# Patient Record
Sex: FEMALE | Race: WHITE
[De-identification: names, ages, dates, MRNs, and addresses within clinical notes are randomized per-mention and may not be internally consistent; named-entity substitution may affect disease eponyms.]

---

## 2018-08-23 ENCOUNTER — HOSPITAL ENCOUNTER (EMERGENCY)
Dept: HOSPITAL 61 - ER | Age: 83
Discharge: HOME | End: 2018-08-23
Payer: MEDICARE

## 2018-08-23 VITALS — WEIGHT: 176 LBS | HEIGHT: 64 IN | BODY MASS INDEX: 30.05 KG/M2

## 2018-08-23 VITALS — SYSTOLIC BLOOD PRESSURE: 164 MMHG | DIASTOLIC BLOOD PRESSURE: 89 MMHG

## 2018-08-23 DIAGNOSIS — Z88.8: ICD-10-CM

## 2018-08-23 DIAGNOSIS — Z90.710: ICD-10-CM

## 2018-08-23 DIAGNOSIS — E78.00: ICD-10-CM

## 2018-08-23 DIAGNOSIS — E03.9: ICD-10-CM

## 2018-08-23 DIAGNOSIS — Z88.5: ICD-10-CM

## 2018-08-23 DIAGNOSIS — Z95.0: ICD-10-CM

## 2018-08-23 DIAGNOSIS — Z90.89: ICD-10-CM

## 2018-08-23 DIAGNOSIS — R00.0: ICD-10-CM

## 2018-08-23 DIAGNOSIS — Z88.2: ICD-10-CM

## 2018-08-23 DIAGNOSIS — Z90.49: ICD-10-CM

## 2018-08-23 DIAGNOSIS — N30.10: Primary | ICD-10-CM

## 2018-08-23 DIAGNOSIS — Z91.041: ICD-10-CM

## 2018-08-23 DIAGNOSIS — I11.9: ICD-10-CM

## 2018-08-23 LAB
ALBUMIN SERPL-MCNC: 3.5 G/DL (ref 3.4–5)
ALBUMIN/GLOB SERPL: 1 {RATIO} (ref 1–1.7)
ALP SERPL-CCNC: 76 U/L (ref 46–116)
ALT SERPL-CCNC: 18 U/L (ref 14–59)
ANION GAP SERPL CALC-SCNC: 9 MMOL/L (ref 6–14)
APTT PPP: (no result) S
AST SERPL-CCNC: 15 U/L (ref 15–37)
BACTERIA #/AREA URNS HPF: 0 /HPF
BASOPHILS # BLD AUTO: 0.1 X10^3/UL (ref 0–0.2)
BASOPHILS NFR BLD: 1 % (ref 0–3)
BILIRUB SERPL-MCNC: 0.8 MG/DL (ref 0.2–1)
BILIRUB UR QL STRIP: (no result)
BUN SERPL-MCNC: 15 MG/DL (ref 7–20)
BUN/CREAT SERPL: 19 (ref 6–20)
CALCIUM SERPL-MCNC: 9 MG/DL (ref 8.5–10.1)
CHLORIDE SERPL-SCNC: 103 MMOL/L (ref 98–107)
CO2 SERPL-SCNC: 26 MMOL/L (ref 21–32)
CREAT SERPL-MCNC: 0.8 MG/DL (ref 0.6–1)
EOSINOPHIL NFR BLD: 0.2 X10^3/UL (ref 0–0.7)
EOSINOPHIL NFR BLD: 3 % (ref 0–3)
ERYTHROCYTE [DISTWIDTH] IN BLOOD BY AUTOMATED COUNT: 14.7 % (ref 11.5–14.5)
FIBRINOGEN PPP-MCNC: CLEAR MG/DL
GFR SERPLBLD BASED ON 1.73 SQ M-ARVRAT: 67.8 ML/MIN
GLOBULIN SER-MCNC: 3.5 G/DL (ref 2.2–3.8)
GLUCOSE SERPL-MCNC: 124 MG/DL (ref 70–99)
HCT VFR BLD CALC: 43.6 % (ref 36–47)
HGB BLD-MCNC: 14.7 G/DL (ref 12–15.5)
HYALINE CASTS #/AREA URNS LPF: (no result) /HPF
LIPASE: 297 U/L (ref 73–393)
LYMPHOCYTES # BLD: 1.9 X10^3/UL (ref 1–4.8)
LYMPHOCYTES NFR BLD AUTO: 23 % (ref 24–48)
MCH RBC QN AUTO: 30 PG (ref 25–35)
MCHC RBC AUTO-ENTMCNC: 34 G/DL (ref 31–37)
MCV RBC AUTO: 90 FL (ref 79–100)
MONO #: 0.9 X10^3/UL (ref 0–1.1)
MONOCYTES NFR BLD: 11 % (ref 0–9)
NEUT #: 5.1 X10^3UL (ref 1.8–7.7)
NEUTROPHILS NFR BLD AUTO: 62 % (ref 31–73)
NITRITE UR QL STRIP: (no result)
PH UR STRIP: (no result) [PH]
PLATELET # BLD AUTO: 328 X10^3/UL (ref 140–400)
POTASSIUM SERPL-SCNC: 3.7 MMOL/L (ref 3.5–5.1)
PROT SERPL-MCNC: 7 G/DL (ref 6.4–8.2)
PROT UR STRIP-MCNC: (no result) MG/DL
RBC # BLD AUTO: 4.86 X10^6/UL (ref 3.5–5.4)
RBC #/AREA URNS HPF: 0 /HPF (ref 0–2)
SODIUM SERPL-SCNC: 138 MMOL/L (ref 136–145)
SQUAMOUS #/AREA URNS LPF: (no result) /LPF
UROBILINOGEN UR-MCNC: (no result) MG/DL
WBC # BLD AUTO: 8.2 X10^3/UL (ref 4–11)
WBC #/AREA URNS HPF: 0 /HPF (ref 0–4)

## 2018-08-23 PROCEDURE — 83605 ASSAY OF LACTIC ACID: CPT

## 2018-08-23 PROCEDURE — 93005 ELECTROCARDIOGRAM TRACING: CPT

## 2018-08-23 PROCEDURE — 81001 URINALYSIS AUTO W/SCOPE: CPT

## 2018-08-23 PROCEDURE — 83690 ASSAY OF LIPASE: CPT

## 2018-08-23 PROCEDURE — 36415 COLL VENOUS BLD VENIPUNCTURE: CPT

## 2018-08-23 PROCEDURE — 99285 EMERGENCY DEPT VISIT HI MDM: CPT

## 2018-08-23 PROCEDURE — 85025 COMPLETE CBC W/AUTO DIFF WBC: CPT

## 2018-08-23 PROCEDURE — 80053 COMPREHEN METABOLIC PANEL: CPT

## 2018-08-23 RX ADMIN — BACITRACIN ONE MLS/HR: 5000 INJECTION, POWDER, FOR SOLUTION INTRAMUSCULAR at 09:32

## 2018-08-23 NOTE — EKG
[facility]

              [address] [place] [address]

Test Date:    2018               Test Time:    08:29:40

Pat Name:     KEARA FOX         Department:   

Patient ID:   [id number]           Room:          

Gender:       F                        Technician:   

:          [id number]               Requested By: ELIZABET TABARES

Order Number: [id number]           Reading MD:   Fernando Torres MD

                                 Measurements

Intervals                              Axis          

Rate:         105                      P:            26

VT:           160                      QRS:          15

QRSD:         166                      T:            -159

QT:           380                                    

QTc:          506                                    

                           Interpretive Statements

V-PACED RHYTHM

PROBABLE SINUS

Electronically Signed On 2018 14:54:34 CDT by Fernando Torres MD

## 2018-08-23 NOTE — PHYS DOC
Past Medical History


Past Medical History:  High Cholesterol, Heart Disease, Hypertension, 

Hypothyroid, Other


Additional Past Medical Histor:  Freq.UTIs


Past Surgical History:  Appendectomy, Cholecystectomy, Hysterectomy, Pacemaker, 

Other


Additional Past Surgical Histo:  Back surgery,breast reduction,dental implants.


Alcohol Use:  None


Drug Use:  None





Adult General


Chief Complaint


Chief Complaint:  PAIN ON URINATION





[facility]


HPI





Patient is a 87  year old female who presents with complaining of painful 

urination with burning and dysuria for the last few days. Patient states she 

has off and on problem with her urination for the last 6 months and taking 

multiple courses of antibiotic and getting better and worse. Patient states she 

had painful urination with dysuria for the last few days that gradually getting 

worse. Patient complaining of nausea and discomfort feeling in lower abdomen 

without lotion of pain. Patient is vomiting, fever and chills, diarrhea, chest 

pain and shortness of breath.





Review of Systems


Review of Systems





Constitutional: Denies fever or chills []


Eyes: Denies change in visual acuity, redness, or eye pain []


HENT: Denies nasal congestion or sore throat []


Respiratory: Denies cough or shortness of breath []


Cardiovascular: No additional information not addressed in HPI []


GI: Reports abdominal pain, nausea, denies vomiting, bloody stools or diarrhea [

]


: Reports dysuria


Musculoskeletal: Denies back pain or joint pain []


Integument: Denies rash or skin lesions []


Neurologic: Denies headache, focal weakness or sensory changes []


Endocrine: Denies polyuria or polydipsia []





All other systems were reviewed and found to be within normal limits, except as 

documented in this note.





Current Medications


Current Medications





Current Medications








 Medications


  (Trade)  Dose


 Ordered  Sig/Aime  Start Time


 Stop Time Status Last Admin


Dose Admin


 


 Sodium Chloride  500 ml @ 


 500 mls/hr  1X  ONCE  8/23/18 09:30


 8/23/18 10:29 DC 8/23/18 09:32


500 MLS/HR











Allergies


Allergies





Allergies








Coded Allergies Type Severity Reaction Last Updated Verified


 


  Sulfa (Sulfonamide Antibiotics) Allergy Intermediate Unknown 8/23/18 Yes


 


  adhesive tape Allergy Intermediate Hives 8/23/18 Yes


 


  codeine Allergy Intermediate "Builds up-makes me crazy" 8/23/18 Yes


 


  erythromycin base Allergy Intermediate Swelling. 8/23/18 Yes


 


  hydrocodone Allergy Intermediate "Builds up-makes me crazy." 8/23/18 Yes


 


  iodine Allergy Intermediate Swelling. 8/23/18 Yes


 


  meperidine Allergy Intermediate Itch,N/V,Swelling 8/23/18 Yes











Physical Exam


Physical Exam





Constitutional: Well developed, well nourished, mild distress, non-toxic 

appearance. []


HENT: Normocephalic, atraumaticoropharynx moist, no oral exudates, nose normal. 

[]


Eyes: PERRLA, EOMI, conjunctiva normal, no discharge. [] 


Neck: Normal range of motion, no tenderness, supple, no stridor. [] 


Cardiovascular: Tachycardia, no murmur []


Lungs & Thorax:  Bilateral breath sounds clear to auscultation []


Abdomen: Bowel sounds normal, soft, suprapubic guarding, no tenderness, no 

masses, no pulsatile masses. [] 


Skin: Warm, dry, no erythema, no rash. [] 


Back: No tenderness, no CVA tenderness. [] 


Extremities: No tenderness, no cyanosis, no clubbing, ROM intact, no edema. [] 


Neurologic: Alert and oriented X 3, normal motor function, normal sensory 

function, no focal deficits noted. []


Psychologic: Affect normal, judgement normal, mood normal. []





Current Patient Data


Vital Signs





 Vital Signs








  Date Time  Temp Pulse Resp B/P (MAP) Pulse Ox O2 Delivery O2 Flow Rate FiO2


 


8/23/18 09:50  92 20 164/89 (114) 92 Room Air  


 


8/23/18 07:46 97.6       





 97.6       








Lab Values





 Laboratory Tests








Test


 8/23/18


08:00


 


White Blood Count


 8.2 x10^3/uL


(4.0-11.0)


 


Red Blood Count


 4.86 x10^6/uL


(3.50-5.40)


 


Hemoglobin


 14.7 g/dL


(12.0-15.5)


 


Hematocrit


 43.6 %


(36.0-47.0)


 


Mean Corpuscular Volume


 90 fL ()





 


Mean Corpuscular Hemoglobin 30 pg (25-35)  


 


Mean Corpuscular Hemoglobin


Concent 34 g/dL


(31-37)


 


Red Cell Distribution Width


 14.7 %


(11.5-14.5)  H


 


Platelet Count


 328 x10^3/uL


(140-400)


 


Neutrophils (%) (Auto) 62 % (31-73)  


 


Lymphocytes (%) (Auto) 23 % (24-48)  L


 


Monocytes (%) (Auto) 11 % (0-9)  H


 


Eosinophils (%) (Auto) 3 % (0-3)  


 


Basophils (%) (Auto) 1 % (0-3)  


 


Neutrophils # (Auto)


 5.1 x10^3uL


(1.8-7.7)


 


Lymphocytes # (Auto)


 1.9 x10^3/uL


(1.0-4.8)


 


Monocytes # (Auto)


 0.9 x10^3/uL


(0.0-1.1)


 


Eosinophils # (Auto)


 0.2 x10^3/uL


(0.0-0.7)


 


Basophils # (Auto)


 0.1 x10^3/uL


(0.0-0.2)


 


Urine Collection Type U cath  


 


Urine Color Orange  


 


Urine Clarity Clear  


 


Urine pH   


 


Urine Specific Gravity   


 


Urine Protein


 mg/dL


(NEG-TRACE)


 


Urine Glucose (UA)  mg/dL (NEG)  


 


Urine Ketones (Stick)  mg/dL (NEG)  


 


Urine Blood  (NEG)  


 


Urine Nitrite  (NEG)  


 


Urine Bilirubin  (NEG)  


 


Urine Urobilinogen Dipstick


 mg/dL (0.2


mg/dL)


 


Urine Leukocyte Esterase  (NEG)  


 


Urine RBC 0 /HPF (0-2)  


 


Urine WBC 0 /HPF (0-4)  


 


Urine Squamous Epithelial


Cells Occ /LPF  





 


Urine Bacteria


 0 /HPF (0-FEW)





 


Urine Hyaline Casts Few /HPF  


 


Urine Mucus Mod /LPF  


 


Sodium Level


 138 mmol/L


(136-145)


 


Potassium Level


 3.7 mmol/L


(3.5-5.1)


 


Chloride Level


 103 mmol/L


()


 


Carbon Dioxide Level


 26 mmol/L


(21-32)


 


Anion Gap 9 (6-14)  


 


Blood Urea Nitrogen


 15 mg/dL


(7-20)


 


Creatinine


 0.8 mg/dL


(0.6-1.0)


 


Estimated GFR


(Cockcroft-Gault) 67.8  





 


BUN/Creatinine Ratio 19 (6-20)  


 


Glucose Level


 124 mg/dL


(70-99)  H


 


Lactic Acid Level


 1.0 mmol/L


(0.4-2.0)


 


Calcium Level


 9.0 mg/dL


(8.5-10.1)


 


Total Bilirubin


 0.8 mg/dL


(0.2-1.0)


 


Aspartate Amino Transferase


(AST) 15 U/L (15-37)





 


Alanine Aminotransferase (ALT)


 18 U/L (14-59)





 


Alkaline Phosphatase


 76 U/L


()


 


Total Protein


 7.0 g/dL


(6.4-8.2)


 


Albumin


 3.5 g/dL


(3.4-5.0)


 


Albumin/Globulin Ratio 1.0 (1.0-1.7)  


 


Lipase


 297 U/L


()





 Laboratory Tests


8/23/18 08:00








 Laboratory Tests


8/23/18 08:00














EKG


EKG


Case interpreted by me. EKG at 0 829 showed sinus tachycardia at rate of 105, 

nonspecific intraventricular block, no acute ST and T-wave abnormalities.[]





Radiology/Procedures


Radiology/Procedures


[]





Course & Med Decision Making


Course & Med Decision Making


Pertinent Labs reviewed. (See chart for details)


Evolution of patient in ER showed 87-year-old female patient with complaining 

of urinary frequency and dysuria intermittently for 6 months that did not worse 

for the last few days. Patient has suprapubic guarding. Patient had heart rate 

of 105 without atrial fibrillation area and labs including UA, CBC, CMP, lactic 

acid was unremarkable. Patient states she has had history of tachycardia that 

getting better with taking her home medication but she didn't take her 

medication this morning. Patient treated with IV fluid and heart rate dropped 

to 90s. Plan discharge patient home with diagnosis of interstitial cystitis and 

instruction to follow up with on-call urologist.





Dragon Disclaimer


Dragon Disclaimer


This electronic medical record was generated, in whole or in part, using a 

voice recognition dictation system.





Departure


Departure


Impression:  


 Primary Impression:  


 Interstitial cystitis


 Additional Impression:  


 Sinus tachycardia


Disposition:  01 HOME, SELF-CARE (at 0924)


Condition:  STABLE


Referrals:  


CHUNG CROSS MD


Patient Instructions:  Interstitial Cystitis, Nonspecific Tachycardia





Additional Instructions:  





Follow-up with on-call urology in one or 2 days 


Drink plenty of liquids


Follow-up with your primary care physician in 3-5 days


Return to ER if not getting better


Scripts


Phenazopyridine Hcl (PYRIDIUM) 100 Mg Tablet


100 MG PO TID, #20 TAB


   Prov: ELIZABET TABARES MD         8/23/18





Problem Qualifiers











ELIZABET TABARES MD Aug 23, 2018 09:27

## 2018-08-26 ENCOUNTER — HOSPITAL ENCOUNTER (EMERGENCY)
Dept: HOSPITAL 61 - ER | Age: 83
Discharge: HOME | End: 2018-08-26
Payer: MEDICARE

## 2018-08-26 VITALS — WEIGHT: 176 LBS | BODY MASS INDEX: 28.28 KG/M2 | HEIGHT: 66 IN

## 2018-08-26 VITALS — SYSTOLIC BLOOD PRESSURE: 166 MMHG | DIASTOLIC BLOOD PRESSURE: 92 MMHG

## 2018-08-26 DIAGNOSIS — Z95.0: ICD-10-CM

## 2018-08-26 DIAGNOSIS — Z88.8: ICD-10-CM

## 2018-08-26 DIAGNOSIS — Z91.048: ICD-10-CM

## 2018-08-26 DIAGNOSIS — Z88.5: ICD-10-CM

## 2018-08-26 DIAGNOSIS — E78.00: ICD-10-CM

## 2018-08-26 DIAGNOSIS — E03.9: ICD-10-CM

## 2018-08-26 DIAGNOSIS — Z88.2: ICD-10-CM

## 2018-08-26 DIAGNOSIS — K57.92: Primary | ICD-10-CM

## 2018-08-26 DIAGNOSIS — I10: ICD-10-CM

## 2018-08-26 DIAGNOSIS — Z90.710: ICD-10-CM

## 2018-08-26 DIAGNOSIS — Z90.49: ICD-10-CM

## 2018-08-26 LAB
ALBUMIN SERPL-MCNC: 3.1 G/DL (ref 3.4–5)
ALBUMIN/GLOB SERPL: 0.9 {RATIO} (ref 1–1.7)
ALP SERPL-CCNC: 74 U/L (ref 46–116)
ALT SERPL-CCNC: 16 U/L (ref 14–59)
ANION GAP SERPL CALC-SCNC: 4 MMOL/L (ref 6–14)
APTT PPP: YELLOW S
AST SERPL-CCNC: 13 U/L (ref 15–37)
BACTERIA #/AREA URNS HPF: (no result) /HPF
BASOPHILS # BLD AUTO: 0.1 X10^3/UL (ref 0–0.2)
BASOPHILS NFR BLD: 1 % (ref 0–3)
BILIRUB SERPL-MCNC: 0.6 MG/DL (ref 0.2–1)
BILIRUB UR QL STRIP: NEGATIVE
BUN SERPL-MCNC: 11 MG/DL (ref 7–20)
BUN/CREAT SERPL: 16 (ref 6–20)
CALCIUM SERPL-MCNC: 8.6 MG/DL (ref 8.5–10.1)
CHLORIDE SERPL-SCNC: 103 MMOL/L (ref 98–107)
CO2 SERPL-SCNC: 29 MMOL/L (ref 21–32)
CREAT SERPL-MCNC: 0.7 MG/DL (ref 0.6–1)
EOSINOPHIL NFR BLD: 0.3 X10^3/UL (ref 0–0.7)
EOSINOPHIL NFR BLD: 3 % (ref 0–3)
ERYTHROCYTE [DISTWIDTH] IN BLOOD BY AUTOMATED COUNT: 14.4 % (ref 11.5–14.5)
FIBRINOGEN PPP-MCNC: CLEAR MG/DL
GFR SERPLBLD BASED ON 1.73 SQ M-ARVRAT: 79.2 ML/MIN
GLOBULIN SER-MCNC: 3.3 G/DL (ref 2.2–3.8)
GLUCOSE SERPL-MCNC: 121 MG/DL (ref 70–99)
HCT VFR BLD CALC: 41.5 % (ref 36–47)
HGB BLD-MCNC: 14.1 G/DL (ref 12–15.5)
LIPASE: 149 U/L (ref 73–393)
LYMPHOCYTES # BLD: 1.4 X10^3/UL (ref 1–4.8)
LYMPHOCYTES NFR BLD AUTO: 16 % (ref 24–48)
MCH RBC QN AUTO: 31 PG (ref 25–35)
MCHC RBC AUTO-ENTMCNC: 34 G/DL (ref 31–37)
MCV RBC AUTO: 90 FL (ref 79–100)
MONO #: 0.8 X10^3/UL (ref 0–1.1)
MONOCYTES NFR BLD: 9 % (ref 0–9)
NEUT #: 6.2 X10^3UL (ref 1.8–7.7)
NEUTROPHILS NFR BLD AUTO: 71 % (ref 31–73)
NITRITE UR QL STRIP: POSITIVE
PH UR STRIP: 6 [PH]
PLATELET # BLD AUTO: 283 X10^3/UL (ref 140–400)
POTASSIUM SERPL-SCNC: 3.6 MMOL/L (ref 3.5–5.1)
PROT SERPL-MCNC: 6.4 G/DL (ref 6.4–8.2)
PROT UR STRIP-MCNC: NEGATIVE MG/DL
PROTHROMBIN TIME: 14.1 SEC (ref 11.7–14)
RBC # BLD AUTO: 4.61 X10^6/UL (ref 3.5–5.4)
RBC #/AREA URNS HPF: (no result) /HPF (ref 0–2)
SODIUM SERPL-SCNC: 136 MMOL/L (ref 136–145)
SQUAMOUS #/AREA URNS LPF: (no result) /LPF
UROBILINOGEN UR-MCNC: 1 MG/DL
WBC # BLD AUTO: 8.7 X10^3/UL (ref 4–11)

## 2018-08-26 PROCEDURE — 83690 ASSAY OF LIPASE: CPT

## 2018-08-26 PROCEDURE — 36415 COLL VENOUS BLD VENIPUNCTURE: CPT

## 2018-08-26 PROCEDURE — 87186 SC STD MICRODIL/AGAR DIL: CPT

## 2018-08-26 PROCEDURE — 85025 COMPLETE CBC W/AUTO DIFF WBC: CPT

## 2018-08-26 PROCEDURE — 81001 URINALYSIS AUTO W/SCOPE: CPT

## 2018-08-26 PROCEDURE — 80053 COMPREHEN METABOLIC PANEL: CPT

## 2018-08-26 PROCEDURE — 85610 PROTHROMBIN TIME: CPT

## 2018-08-26 PROCEDURE — 99285 EMERGENCY DEPT VISIT HI MDM: CPT

## 2018-08-26 PROCEDURE — 74176 CT ABD & PELVIS W/O CONTRAST: CPT

## 2018-08-26 PROCEDURE — 84484 ASSAY OF TROPONIN QUANT: CPT

## 2018-08-26 PROCEDURE — 87086 URINE CULTURE/COLONY COUNT: CPT

## 2018-08-26 NOTE — RAD
EXAM: Abdomen and pelvis CT without intravenous contrast.

 

HISTORY: Right lower quadrant pain.

 

TECHNIQUE: Computed tomographic images of the abdomen and pelvis were 

obtained without contrast. Multiplanar reformatting was performed. 

*One or more of the following individualized dose reduction techniques 

were utilized for this examination:  

1. Automated exposure control.  

2. Adjustment of the mA and/or kV according to patient size.  

3. Use of iterative reconstruction technique.

 

COMPARISON: None.

 

FINDINGS: Evaluation of the lower thorax demonstrates right middle lobe 

and lingular atelectasis or scarring. There is minimal posterior dependent

and basilar atelectasis. There is no infiltrate. There are cardiac 

pacemaker leads. There is calcification of the mitral valve annulus. There

is a small hiatal hernia. There are surgical clips within the left upper 

quadrant possibly due to prior hernia repair.

 

No hepatic lesion is seen. The gallbladder is surgically absent. There are

calcifications along the pancreatic body would or vascular or due to the 

sequela of chronic pancreatitis. The adrenal glands are unremarkable. 

There are few granulomas within the spleen.

 

There is no evidence of nephroureterolithiasis or obstructive uropathy. 

There is a 4 mm hyperdense lesion within the posterior mid zone of the 

left kidney, likely a hemorrhagic cyst. There is a 6 mm partially 

exophytic lesion along the anterior lower mid zone of the left kidney, 

possibly a cyst. There are areas of nodularity involving the inferior 

medial right kidney and anterior superior left kidney measuring 2.3 cm and

3.7 cm, difficult to assess in the absence of contrast. The possibility of

solid mass is not excluded.

 

There is mesh along the posterior and lateral right lower thoracic and 

abdominal wall due to prior hernia repair. There is protrusion of fat 

along the inferior aspect of the mesh along the superior aspect of the 

right iliac bone. There is a posterior left abdominal wall hernia 

containing fat and a segment of descending colon. The hernia sac measures 

10.2 cm in maximum dimension.

 

There are several small fat-containing midline supraumbilical hernias 

containing fat. The largest of these measures 4.8 cm in maximum dimension 

and associated with slight protrusion of a portion of the wall of the 

transverse colon. There is a small fat-containing left periumbilical 

hernia measuring 2.6 cm.

 

There is mild circumferential wall thickening involving the cecum and 

proximal ascending colon. There is no pericolonic stranding in this 

region. There is colonic diverticulosis. There is segmental wall 

thickening with minimal surrounding fatty stranding involving the proximal

to mid sigmoid colon, possibly due to diverticulitis. No abscess is seen. 

The bladder is unremarkable. The uterus is surgically absent.

 

There is a tortuous atherosclerotic abdominal aorta. There is no 

lymphadenopathy. There are degenerative changes throughout the spine. 

There is grade 1 anterolisthesis at L4-L5 and L5-S1. There are moderate to

severe chronic compression fractures with vertebroplasty changes at T12 

and T9. There is slight retropulsion of the cortex at T12 resulting in 

mild central canal stenosis. No acute fracture is seen.

 

IMPRESSION: 

1. Colonic diverticulosis with short segment of mucosal thickening and 

slight surrounding stranding involving the proximal to mid sigmoid colon 

suggesting a component of diverticulitis.

2. Mucosal wall thickening involving the cecum and ascending colon. This 

is greater than expected for peristalsis. The absence of surrounding fatty

stranding suggests possible wall thickening due to the sequela of chronic 

inflammation. Correlate for history of colitis.

3. Left posterior abdominal wall hernia containing fat and a segment of 

descending colon. There is evidence of prior right posterior lateral 

abdominal wall hernia repair. There is a small fat-containing hernia along

the inferior aspect of the mesh at the level of the iliac wing.

4. Small fat-containing ventral abdominal wall hernias, described above.

5. Small hiatal hernia and evidence of prior suspected hernia repair.

6. Lobulation along the inferior right and superior left kidneys, 

difficult to assess in the absence of contrast. The possibility of renal 

neoplasm is not excluded. There are also suspected small cysts within the 

left kidney, one of which is hemorrhagic. Correlate with renal sonography.

7. T9 and T12 compression fractures with vertebroplasty changes. No acute 

fracture is seen.

 

Electronically signed by: Collette Medina MD (8/26/2018 9:48 AM) [facility]

## 2018-08-26 NOTE — PHYS DOC
Past Medical History


Past Medical History:  High Cholesterol, Heart Disease, Hypertension, 

Hypothyroid, Other


Additional Past Medical Histor:  Freq.UTIs


Past Surgical History:  Appendectomy, Cholecystectomy, Hysterectomy, Pacemaker, 

Other


Additional Past Surgical Histo:  Back surgery,breast reduction,dental implants.


Alcohol Use:  None


Drug Use:  None





Adult General


Chief Complaint


Chief Complaint:  ABDOMINAL PAIN





HPI


HPI





Patient is a 87  year old female who is presenting with lower abdominal 

discomfort described as a burning sensation she says it is in her bladder she's 

had it for several months she was treated here in the emergency room recently 

for interstitial side cystitis although it is worth noting that she had 0 white 

blood cells in her urine at that time. She says the pain is worse it feels 

burning all throughout her whole abdomen and in her back no fever no vomiting 

history is limited by the patient's dementia.





Review of Systems


Review of Systems





Constitutional: Denies fever or chills []


Eyes: Denies change in visual acuity, redness, or eye pain []


HENT: Denies nasal congestion or sore throat []


Respiratory:occ sob


Cardiovascular: No additional information not addressed in HPI []





Musculoskeletal:


Integument: Denies rash or skin lesions []


Neurologic: Denies headache, focal weakness or sensory changes []








All other systems were reviewed and found to be within normal limits, except as 

documented in this note.





Current Medications


Current Medications





Current Medications








 Medications


  (Trade)  Dose


 Ordered  Sig/Aime  Start Time


 Stop Time Status Last Admin


Dose Admin


 


 Acetaminophen


  (Tylenol)  1,000 mg  1X  ONCE  8/26/18 08:45


 8/26/18 08:46 DC  





 


 Fentanyl Citrate


  (Fentanyl 2ml


 Vial)  50 mcg  1X  ONCE  8/26/18 08:45


 8/26/18 08:45 DC  





 


 Sodium Chloride  1,000 ml @ 


 1,000 mls/hr  1X  ONCE  8/26/18 08:45


 8/26/18 09:44 DC 8/26/18 09:06


1,000 MLS/HR











Allergies


Allergies





Allergies








Coded Allergies Type Severity Reaction Last Updated Verified


 


  Sulfa (Sulfonamide Antibiotics) Allergy Intermediate Unknown 8/23/18 Yes


 


  adhesive tape Allergy Intermediate Hives 8/23/18 Yes


 


  codeine Allergy Intermediate "Builds up-makes me crazy" 8/23/18 Yes


 


  erythromycin base Allergy Intermediate Swelling. 8/23/18 Yes


 


  hydrocodone Allergy Intermediate "Builds up-makes me crazy." 8/23/18 Yes


 


  iodine Allergy Intermediate Swelling. 8/23/18 Yes


 


  meperidine Allergy Intermediate Itch,N/V,Swelling 8/23/18 Yes











Physical Exam


Physical Exam





Constitutional: Well developed, well nourished, mild distress, non-toxic 

appearance. []


HENT: Normocephalic, atraumatic, bilateral external ears normal, oropharynx 

moist, no oral exudates, nose normal. []


Eyes: PERRLA, EOMI, conjunctiva normal, no discharge. [] 


Neck: Normal range of motion, no tenderness, supple, no stridor. [] 


Cardiovascular:Heart rate regular rhythm, no murmur []


Lungs & Thorax:  Bilateral breath sounds clear to auscultation []


Abdomen: Bowel sounds normal, soft, rlq and suprapubic tenderness, no masses, 

no pulsatile masses. [] 


Skin: Warm, dry, no erythema, no rash. [] 


Back:ttp noted diffusely no focality. I feel there is a reducible hernia in the 

posterior lateral aspect of the abdomen and the back.


Extremities: No tenderness, no cyanosis, no clubbing, ROM intact, no edema. [] 


Neurologic: Alert and oriented X2, normal motor function, normal sensory 

function, no focal deficits noted. []


Psychologic: Affect normal, judgement normal, mood ANXIOSU []





Current Patient Data


Vital Signs





 Vital Signs








  Date Time  Temp Pulse Resp B/P (MAP) Pulse Ox O2 Delivery O2 Flow Rate FiO2


 


8/26/18 08:49 97.7 100 16 178/90 (119) 90 Room Air  





 97.7       








Lab Values





 Laboratory Tests








Test


 8/26/18


08:43 8/26/18


08:55


 


Urine Collection Type U cath   


 


Urine Color Yellow   


 


Urine Clarity Clear   


 


Urine pH 6.0   


 


Urine Specific Gravity 1.010   


 


Urine Protein


 Negative mg/dL


(NEG-TRACE) 





 


Urine Glucose (UA)


 Negative mg/dL


(NEG) 





 


Urine Ketones (Stick)


 Negative mg/dL


(NEG) 





 


Urine Blood


 Negative (NEG)


 





 


Urine Nitrite


 Positive (NEG)


 





 


Urine Bilirubin


 Negative (NEG)


 





 


Urine Urobilinogen Dipstick


 1.0 mg/dL (0.2


mg/dL) 





 


Urine Leukocyte Esterase Small (NEG)   


 


Urine RBC


 Occ /HPF (0-2)


 





 


Urine WBC


 11-20 /HPF


(0-4) 





 


Urine Squamous Epithelial


Cells Many /LPF  


 





 


Urine Renal Epithelial Cells Few /LPF   


 


Urine Bacteria


 Many /HPF


(0-FEW) 





 


Urine Mucus Slight /LPF   


 


White Blood Count


 


 8.7 x10^3/uL


(4.0-11.0)


 


Red Blood Count


 


 4.61 x10^6/uL


(3.50-5.40)


 


Hemoglobin


 


 14.1 g/dL


(12.0-15.5)


 


Hematocrit


 


 41.5 %


(36.0-47.0)


 


Mean Corpuscular Volume


 


 90 fL ()





 


Mean Corpuscular Hemoglobin  31 pg (25-35)  


 


Mean Corpuscular Hemoglobin


Concent 


 34 g/dL


(31-37)


 


Red Cell Distribution Width


 


 14.4 %


(11.5-14.5)


 


Platelet Count


 


 283 x10^3/uL


(140-400)


 


Neutrophils (%) (Auto)  71 % (31-73)  


 


Lymphocytes (%) (Auto)  16 % (24-48)  L


 


Monocytes (%) (Auto)  9 % (0-9)  


 


Eosinophils (%) (Auto)  3 % (0-3)  


 


Basophils (%) (Auto)  1 % (0-3)  


 


Neutrophils # (Auto)


 


 6.2 x10^3uL


(1.8-7.7)


 


Lymphocytes # (Auto)


 


 1.4 x10^3/uL


(1.0-4.8)


 


Monocytes # (Auto)


 


 0.8 x10^3/uL


(0.0-1.1)


 


Eosinophils # (Auto)


 


 0.3 x10^3/uL


(0.0-0.7)


 


Basophils # (Auto)


 


 0.1 x10^3/uL


(0.0-0.2)


 


Prothrombin Time


 


 14.1 SEC


(11.7-14.0)  H


 


Prothrombin Time INR  1.1 (0.8-1.1)  


 


Sodium Level


 


 136 mmol/L


(136-145)


 


Potassium Level


 


 3.6 mmol/L


(3.5-5.1)


 


Chloride Level


 


 103 mmol/L


()


 


Carbon Dioxide Level


 


 29 mmol/L


(21-32)


 


Anion Gap  4 (6-14)  L


 


Blood Urea Nitrogen


 


 11 mg/dL


(7-20)


 


Creatinine


 


 0.7 mg/dL


(0.6-1.0)


 


Estimated GFR


(Cockcroft-Gault) 


 79.2  





 


BUN/Creatinine Ratio  16 (6-20)  


 


Glucose Level


 


 121 mg/dL


(70-99)  H


 


Calcium Level


 


 8.6 mg/dL


(8.5-10.1)


 


Total Bilirubin


 


 0.6 mg/dL


(0.2-1.0)


 


Aspartate Amino Transferase


(AST) 


 13 U/L (15-37)


L


 


Alanine Aminotransferase (ALT)


 


 16 U/L (14-59)





 


Alkaline Phosphatase


 


 74 U/L


()


 


Troponin I Quantitative


 


 < 0.017 ng/mL


(0.000-0.055)


 


Total Protein


 


 6.4 g/dL


(6.4-8.2)


 


Albumin


 


 3.1 g/dL


(3.4-5.0)  L


 


Albumin/Globulin Ratio


 


 0.9 (1.0-1.7)


L


 


Lipase


 


 149 U/L


()





 Laboratory Tests


8/26/18 08:55








 Laboratory Tests


8/26/18 08:55











EKG


EKG


[]





Radiology/Procedures


Radiology/Procedures


[]


Impressions:


VIMPRESSION: 


1. Colonic diverticulosis with short segment of mucosal thickening and 


slight surrounding stranding involving the proximal to mid sigmoid colon 


suggesting a component of diverticulitis.


2. Mucosal wall thickening involving the cecum and ascending colon. This 


is greater than expected for peristalsis. The absence of surrounding fatty


stranding suggests possible wall thickening due to the sequela of chronic 


inflammation. Correlate for history of colitis.


3. Left posterior abdominal wall hernia containing fat and a segment of 


descending colon. There is evidence of prior right posterior lateral 


abdominal wall hernia repair. There is a small fat-containing hernia along


the inferior aspect of the mesh at the level of the iliac wing.


4. Small fat-containing ventral abdominal wall hernias, described above.


5. Small hiatal hernia and evidence of prior suspected hernia repair.


6. Lobulation along the inferior right and superior left kidneys, 


difficult to assess in the absence of contrast. The possibility of renal 


neoplasm is not excluded. There are also suspected small cysts within the 


left kidney, one of which is hemorrhagic. Correlate with renal sonography.


7. T9 and T12 compression fractures with vertebroplasty changes. No acute 


fracture is seen.


 


Electronically signed by: Collette Medina MD (8/26/2018 9:48 AM) [facility]





Course & Med Decision Making


Course & Med Decision Making


Pertinent Labs and Imaging studies reviewed. (See chart for details)





[]88 yo f hx of chronic back pain intersitital cystitis coming in with what 

sounds most like bladder pain however there were no white cells were definitely 

do CT to rule out other pathology.








ED summary: There is evidence of possible diverticulitis on CT scan I think 

colitis is probably an over read. Noted the posterior lateral hernia. It 

appears reproducible on my physical examination and patient tells me she has 

had this for several years ever since a back surgery where they used "a vacuum"

. She is not currently interested repair. Patient does have white cells in her 

urine today but there are a lot of squames. I think that we should give her a 

prescription for Augmentin for diverticulitis as well as pain control. I 

offered admission to the hospital however the son says that she is taking oral 

at home and that her pain is actually fairly well controlled overall and that 

she is having bowel movements and passing gas and so he feels that she can do 

this at home which I think is reasonable. Return precautions were discussed and 

they both voiced understanding of the instructions. Discharge instructions also 

included follow-up plan for the renal mass findings need for outpatient renal 

ultrasound.





Dragon Disclaimer


Dragon Disclaimer


This electronic medical record was generated, in whole or in part, using a 

voice recognition dictation system.





Departure


Departure


Impression:  


 Primary Impression:  


 Diverticulitis


Disposition:  01 HOME, SELF-CARE


Condition:  IMPROVED


Referrals:  


FREDA RICO MD (PCP)


Scripts


Oxycodone/Apap 5-325 (PERCOCET 5-325 MG TABLET) 1 Each Tablet


1-2 EACH PO PRN TID PRN for PAIN, #25 TAB


   pain


   Prov: KELL CRUZ MD         8/26/18 


Amoxicillin/Potassium Clav (AUGMENTIN 875-125 TABLET) 1 Each Tablet


1 TAB PO BID, #20 TAB


   Prov: KELL CRUZ MD         8/26/18











KELL CRUZ MD Aug 26, 2018 08:51

## 2018-09-02 ENCOUNTER — HOSPITAL ENCOUNTER (EMERGENCY)
Dept: HOSPITAL 61 - ER | Age: 83
Discharge: HOME | End: 2018-09-02
Payer: MEDICARE

## 2018-09-02 VITALS
DIASTOLIC BLOOD PRESSURE: 69 MMHG | SYSTOLIC BLOOD PRESSURE: 140 MMHG | DIASTOLIC BLOOD PRESSURE: 69 MMHG | SYSTOLIC BLOOD PRESSURE: 140 MMHG

## 2018-09-02 VITALS — WEIGHT: 176 LBS | HEIGHT: 64 IN | BODY MASS INDEX: 30.05 KG/M2

## 2018-09-02 DIAGNOSIS — Z90.89: ICD-10-CM

## 2018-09-02 DIAGNOSIS — Z88.1: ICD-10-CM

## 2018-09-02 DIAGNOSIS — Z88.8: ICD-10-CM

## 2018-09-02 DIAGNOSIS — I11.9: ICD-10-CM

## 2018-09-02 DIAGNOSIS — E78.00: ICD-10-CM

## 2018-09-02 DIAGNOSIS — Z95.0: ICD-10-CM

## 2018-09-02 DIAGNOSIS — R10.2: Primary | ICD-10-CM

## 2018-09-02 DIAGNOSIS — G89.29: ICD-10-CM

## 2018-09-02 DIAGNOSIS — Z90.710: ICD-10-CM

## 2018-09-02 DIAGNOSIS — E03.9: ICD-10-CM

## 2018-09-02 DIAGNOSIS — Z91.041: ICD-10-CM

## 2018-09-02 DIAGNOSIS — Z88.2: ICD-10-CM

## 2018-09-02 DIAGNOSIS — Z88.5: ICD-10-CM

## 2018-09-02 DIAGNOSIS — Z90.49: ICD-10-CM

## 2018-09-02 LAB
ANION GAP SERPL CALC-SCNC: 3 MMOL/L (ref 6–14)
APTT PPP: (no result) S
BACTERIA #/AREA URNS HPF: 0 /HPF
BASOPHILS # BLD AUTO: 0.1 X10^3/UL (ref 0–0.2)
BASOPHILS NFR BLD: 1 % (ref 0–3)
BILIRUB UR QL STRIP: (no result)
BUN SERPL-MCNC: 6 MG/DL (ref 7–20)
CALCIUM SERPL-MCNC: 8.9 MG/DL (ref 8.5–10.1)
CHLORIDE SERPL-SCNC: 103 MMOL/L (ref 98–107)
CO2 SERPL-SCNC: 34 MMOL/L (ref 21–32)
CREAT SERPL-MCNC: 0.7 MG/DL (ref 0.6–1)
EOSINOPHIL NFR BLD: 0.2 X10^3/UL (ref 0–0.7)
EOSINOPHIL NFR BLD: 3 % (ref 0–3)
ERYTHROCYTE [DISTWIDTH] IN BLOOD BY AUTOMATED COUNT: 15.1 % (ref 11.5–14.5)
FIBRINOGEN PPP-MCNC: CLEAR MG/DL
GFR SERPLBLD BASED ON 1.73 SQ M-ARVRAT: 79.2 ML/MIN
GLUCOSE SERPL-MCNC: 101 MG/DL (ref 70–99)
HCT VFR BLD CALC: 41 % (ref 36–47)
HGB BLD-MCNC: 13.9 G/DL (ref 12–15.5)
LYMPHOCYTES # BLD: 1.5 X10^3/UL (ref 1–4.8)
LYMPHOCYTES NFR BLD AUTO: 21 % (ref 24–48)
MCH RBC QN AUTO: 31 PG (ref 25–35)
MCHC RBC AUTO-ENTMCNC: 34 G/DL (ref 31–37)
MCV RBC AUTO: 90 FL (ref 79–100)
MONO #: 0.6 X10^3/UL (ref 0–1.1)
MONOCYTES NFR BLD: 9 % (ref 0–9)
NEUT #: 4.9 X10^3UL (ref 1.8–7.7)
NEUTROPHILS NFR BLD AUTO: 67 % (ref 31–73)
NITRITE UR QL STRIP: (no result)
PH UR STRIP: (no result) [PH]
PLATELET # BLD AUTO: 219 X10^3/UL (ref 140–400)
POTASSIUM SERPL-SCNC: 3.8 MMOL/L (ref 3.5–5.1)
PROT UR STRIP-MCNC: (no result) MG/DL
RBC # BLD AUTO: 4.54 X10^6/UL (ref 3.5–5.4)
RBC #/AREA URNS HPF: 0 /HPF (ref 0–2)
SODIUM SERPL-SCNC: 140 MMOL/L (ref 136–145)
SQUAMOUS #/AREA URNS LPF: (no result) /LPF
UROBILINOGEN UR-MCNC: (no result) MG/DL
WBC # BLD AUTO: 7.3 X10^3/UL (ref 4–11)
WBC #/AREA URNS HPF: 0 /HPF (ref 0–4)

## 2018-09-02 PROCEDURE — 36415 COLL VENOUS BLD VENIPUNCTURE: CPT

## 2018-09-02 PROCEDURE — 74176 CT ABD & PELVIS W/O CONTRAST: CPT

## 2018-09-02 PROCEDURE — 81001 URINALYSIS AUTO W/SCOPE: CPT

## 2018-09-02 PROCEDURE — 99285 EMERGENCY DEPT VISIT HI MDM: CPT

## 2018-09-02 PROCEDURE — 85025 COMPLETE CBC W/AUTO DIFF WBC: CPT

## 2018-09-02 PROCEDURE — 96374 THER/PROPH/DIAG INJ IV PUSH: CPT

## 2018-09-02 PROCEDURE — 80048 BASIC METABOLIC PNL TOTAL CA: CPT

## 2018-09-02 PROCEDURE — P9612 CATHETERIZE FOR URINE SPEC: HCPCS

## 2018-09-02 NOTE — PHYS DOC
Past Medical History


Past Medical History:  High Cholesterol, Heart Disease, Hypertension, 

Hypothyroid, Other


Additional Past Medical Histor:  Freq.UTIs


Past Surgical History:  Appendectomy, Cholecystectomy, Hysterectomy, Pacemaker, 

Other


Additional Past Surgical Histo:  Back surgery,breast reduction,dental implants.


Alcohol Use:  None


Drug Use:  None





Adult General


Chief Complaint


Chief Complaint:  MEDICATION REFILL





[facility]


HPI





Patient is a 87  year old female who presents with pelvic pain.  The patient is 

primarily requesting a medication refill. She was evaluated here on August 26. 

At that time she was diagnosed with possible urinary tract infection although 

her urine was contaminated as a sample. She also had possible diverticulitis. 

She was discharged home on Augmentin and given some Percocet for pain. Of note, 

the patient does also have chronic pelvic pain. She returns to the emergency 

department today because she ran out of her Percocet. She is requesting a 

refill. She does have ongoing pain in the pelvic area. She denies nausea or 

vomiting. She is having normal bowel movements. She has no fever. She denies 

urinary symptoms. She has been taking Augmentin was prescribed.





Review of Systems


Review of Systems





Constitutional: Denies fever or chills 


Eyes: Denies change in visual acuity, redness, or eye pain 


HENT: Denies nasal congestion or sore throat 


Respiratory: Denies cough or shortness of breath 


Cardiovascular: No additional information not addressed in HPI 


GI: Denies n/v/d


: Denies dysuria or hematuria []


Musculoskeletal: Denies back pain or joint pain []


Integument: Denies rash or skin lesions []


Neurologic: Denies headache, focal weakness or sensory changes []


Endocrine: Denies polyuria or polydipsia []





All other systems were reviewed and found to be within normal limits, except as 

documented in this note.





Current Medications


Current Medications





Current Medications








 Medications


  (Trade)  Dose


 Ordered  Sig/Aime  Start Time


 Stop Time Status Last Admin


Dose Admin


 


 Morphine Sulfate


  (Morphine


 Sulfate)  4 mg  1X  ONCE  9/2/18 11:15


 9/2/18 11:17 DC 9/2/18 11:47


4 MG











Allergies


Allergies





Allergies








Coded Allergies Type Severity Reaction Last Updated Verified


 


  Sulfa (Sulfonamide Antibiotics) Allergy Intermediate Unknown 8/23/18 Yes


 


  adhesive tape Allergy Intermediate Hives 8/23/18 Yes


 


  erythromycin base Allergy Intermediate Swelling. 8/23/18 Yes


 


  iodine Allergy Intermediate Swelling. 8/23/18 Yes


 


  meperidine Allergy Intermediate Itch,N/V,Swelling 8/23/18 Yes


 


  codeine Adverse Reaction Intermediate "Builds up-makes me crazy" 9/2/18 Yes


 


  hydrocodone Adverse Reaction Intermediate "Builds up-makes me crazy." 9/2/18 

Yes











Physical Exam


Physical Exam





Constitutional: Well developed, well nourished, no acute distress


HENT: Normocephalic, atraumatic, bilateral external ears normal, oropharynx 

moist


Eyes: PERRLA, EOMI, conjunctiva normal 


Neck: Normal range of motion, no tenderness 


Cardiovascular:Heart rate regular rhythm


Lungs & Thorax:  Bilateral breath sounds clear to auscultation


Abdomen: Bowel sounds normal, soft 


Skin: Warm, dry, no erythema,  


Back: No tenderness, no CVA tenderness 


Extremities: No tenderness, no cyanosis, no clubbing, ROM intact, no edema 


Neurologic: Alert and oriented X 3, normal motor function


Psychologic: Affect normal





Current Patient Data


Vital Signs





 Vital Signs








  Date Time  Temp Pulse Resp B/P (MAP) Pulse Ox O2 Delivery O2 Flow Rate FiO2


 


9/2/18 14:06  74 16 140/69 (92) 96 Nasal Cannula 2.0 


 


9/2/18 10:16 97.6       





 97.6       








Lab Values





 Laboratory Tests








Test


 9/2/18


11:30 9/2/18


11:50


 


Urine Collection Type U cath   


 


Urine Color Orange   


 


Urine Clarity Clear   


 


Urine pH    


 


Urine Specific Gravity 1.015   


 


Urine Protein


 mg/dL


(NEG-TRACE) 





 


Urine Glucose (UA)  mg/dL (NEG)   


 


Urine Ketones (Stick)  mg/dL (NEG)   


 


Urine Blood  (NEG)   


 


Urine Nitrite  (NEG)   


 


Urine Bilirubin  (NEG)   


 


Urine Urobilinogen Dipstick


 mg/dL (0.2


mg/dL) 





 


Urine Leukocyte Esterase  (NEG)   


 


Urine RBC 0 /HPF (0-2)   


 


Urine WBC 0 /HPF (0-4)   


 


Urine Squamous Epithelial


Cells Occ /LPF  


 





 


Urine Bacteria


 0 /HPF (0-FEW)


 





 


White Blood Count


 


 7.3 x10^3/uL


(4.0-11.0)


 


Red Blood Count


 


 4.54 x10^6/uL


(3.50-5.40)


 


Hemoglobin


 


 13.9 g/dL


(12.0-15.5)


 


Hematocrit


 


 41.0 %


(36.0-47.0)


 


Mean Corpuscular Volume


 


 90 fL ()





 


Mean Corpuscular Hemoglobin  31 pg (25-35)  


 


Mean Corpuscular Hemoglobin


Concent 


 34 g/dL


(31-37)


 


Red Cell Distribution Width


 


 15.1 %


(11.5-14.5)  H


 


Platelet Count


 


 219 x10^3/uL


(140-400)


 


Neutrophils (%) (Auto)  67 % (31-73)  


 


Lymphocytes (%) (Auto)  21 % (24-48)  L


 


Monocytes (%) (Auto)  9 % (0-9)  


 


Eosinophils (%) (Auto)  3 % (0-3)  


 


Basophils (%) (Auto)  1 % (0-3)  


 


Neutrophils # (Auto)


 


 4.9 x10^3uL


(1.8-7.7)


 


Lymphocytes # (Auto)


 


 1.5 x10^3/uL


(1.0-4.8)


 


Monocytes # (Auto)


 


 0.6 x10^3/uL


(0.0-1.1)


 


Eosinophils # (Auto)


 


 0.2 x10^3/uL


(0.0-0.7)


 


Basophils # (Auto)


 


 0.1 x10^3/uL


(0.0-0.2)


 


Sodium Level


 


 140 mmol/L


(136-145)


 


Potassium Level


 


 3.8 mmol/L


(3.5-5.1)


 


Chloride Level


 


 103 mmol/L


()


 


Carbon Dioxide Level


 


 34 mmol/L


(21-32)  H


 


Anion Gap  3 (6-14)  L


 


Blood Urea Nitrogen


 


 6 mg/dL (7-20)


L


 


Creatinine


 


 0.7 mg/dL


(0.6-1.0)


 


Estimated GFR


(Cockcroft-Gault) 


 79.2  





 


Glucose Level


 


 101 mg/dL


(70-99)  H


 


Calcium Level


 


 8.9 mg/dL


(8.5-10.1)





 Laboratory Tests


9/2/18 11:50








 Laboratory Tests


9/2/18 11:50














EKG


EKG


[]





Radiology/Procedures


Radiology/Procedures


 


FINDINGS: The patient had a recent CT study performed on August 26, 2018. 


See that report. The liver and spleen and pancreas are unremarkable on 


this noncontrast study. The gallbladder is surgically absent. There is 


mild dilatation of the extrahepatic biliary tree measuring up to 9 mm most


likely due to the reservoir effect after cholecystectomy. The wall 


thickening involving the cecum and ascending colon and transverse colon 


seen on the previous study has resolved. The terminal ileum is 


unremarkable. The appendix is not identified but there are no secondary CT


findings of appendicitis. The pericolonic edema of the proximal to mid 


sigmoid colon seen previously has resolved. No new focus of mesenteric or 


pericolonic edema is seen and no abscess or free fluid or free 


intraperitoneal air is evident. Colonic diverticulosis is again evident 


and most severely involves the sigmoid colon. No obstructive bowel pattern


is evident. Again seen are the hernias discussed on the previous report 


and are unchanged.Small hiatal hernia is again evident. Lobulation of both


kidneys previously described is again evident. There is no new 


hydronephrosis or hydroureter or ureteral stone present. Compression 


fractures of the spine are again evident and are unchanged. No lung base 


consolidation is evident.


 


IMPRESSION: Since the previous study of August 26, 2018, there has been 


resolution of the wall thickening of the cecum and ascending colon and 


transverse colon consistent with resolution of colitis. Since the previous


study, there has been resolution of the previously seen pericolonic 


inflammation around the proximal to mid sigmoid colon consistent with 


resolution of diverticulitis. No new finding is seen involving the bowel. 


No bowel obstruction is seen.


 


Cholecystectomy. There is mild dilatation of the extrahepatic biliary tree


which appears slightly more prominent then on the previous study and 


measures 9 mm. This may be secondary to the reservoir effect. Correlation 


with liver function tests is recommended.





Course & Med Decision Making


Course & Med Decision Making


Pertinent Labs and Imaging studies reviewed. (See chart for details)





Patient was evaluated again in the emergency department for abdominal pain. 

There was concern about refill her medicine and the fact that she had ongoing 

pelvic pain. In review of her electronic medical medical record, it seems that 

she does have chronic pelvic pain symptoms thought to be secondary to urinary 

source. Today, a straight catheter urine was obtained. There was no obvious 

infection. Her lab panel also did not reveal acute findings. A repeat CT scan 

was completed to rule out any changes. The previously seen possibility of 

colitis or diverticulitis had resolved entirely. The patient did complain of 

pelvic pain although her physical exam was relatively benign. She certainly did 

not have any peritoneal signs. Ultimately, the patient is discharged to home. 

She already has follow-up scheduled with her primary care physician. She is 

provided a refill of her Percocet for pain. Precautions about use of this 

medication are discussed, specifically the risk of falling. Patient has been 

tolerating this medication up to this point. Patient is accompanied by her son 

who is also present during the exam and discharge process. All of their 

questions are answered.





Dragon Disclaimer


Dragon Disclaimer


This electronic medical record was generated, in whole or in part, using a 

voice recognition dictation system.





Departure


Departure


Impression:  


 Primary Impression:  


 Chronic pelvic pain in female


Disposition:  01 HOME, SELF-CARE


Condition:  GOOD


Patient Instructions:  Pelvic Pain, Female


Scripts


Oxycodone/Apap 5-325 (PERCOCET 5-325 MG TABLET) 1 Each Tablet


1-2 EACH PO PRN TID PRN for severe pain, #30 TAB


   pain


   Prov: JENNIFER STEEN DO         9/2/18











JENNIFER STEEN DO Sep 2, 2018 17:44

## 2018-09-02 NOTE — RAD
CT study of the abdomen and pelvis without contrast

 

INDICATIONS: Ongoing worsening abdominal pain. History of diverticulitis. 

Follow-up study.

 

COMPARISON STUDY: August 26, 2018.

 

TECHNIQUE: Noncontrast helical CT scanning of the abdomen and pelvis was 

performed. Without contrast, the sensitivity to detect organ pathology and

GI tract pathology is decreased.

 

PQRS compliance Statement

 

One or more of the following individualized dose reduction techniques were

utilized for this study:

1.  Automated exposure control

2.  Adjustment of the mA and/or kV according to patient size

3.  Use of iterative reconstruction technique

 

FINDINGS: The patient had a recent CT study performed on August 26, 2018. 

See that report. The liver and spleen and pancreas are unremarkable on 

this noncontrast study. The gallbladder is surgically absent. There is 

mild dilatation of the extrahepatic biliary tree measuring up to 9 mm most

likely due to the reservoir effect after cholecystectomy. The wall 

thickening involving the cecum and ascending colon and transverse colon 

seen on the previous study has resolved. The terminal ileum is 

unremarkable. The appendix is not identified but there are no secondary CT

findings of appendicitis. The pericolonic edema of the proximal to mid 

sigmoid colon seen previously has resolved. No new focus of mesenteric or 

pericolonic edema is seen and no abscess or free fluid or free 

intraperitoneal air is evident. Colonic diverticulosis is again evident 

and most severely involves the sigmoid colon. No obstructive bowel pattern

is evident. Again seen are the hernias discussed on the previous report 

and are unchanged.Small hiatal hernia is again evident. Lobulation of both

kidneys previously described is again evident. There is no new 

hydronephrosis or hydroureter or ureteral stone present. Compression 

fractures of the spine are again evident and are unchanged. No lung base 

consolidation is evident.

 

IMPRESSION: Since the previous study of August 26, 2018, there has been 

resolution of the wall thickening of the cecum and ascending colon and 

transverse colon consistent with resolution of colitis. Since the previous

study, there has been resolution of the previously seen pericolonic 

inflammation around the proximal to mid sigmoid colon consistent with 

resolution of diverticulitis. No new finding is seen involving the bowel. 

No bowel obstruction is seen.

 

Cholecystectomy. There is mild dilatation of the extrahepatic biliary tree

which appears slightly more prominent then on the previous study and 

measures 9 mm. This may be secondary to the reservoir effect. Correlation 

with liver function tests is recommended.

 

Electronically signed by: Michael Norris MD (9/2/2018 1:41 PM) [facility]

## 2018-09-04 ENCOUNTER — HOSPITAL ENCOUNTER (OUTPATIENT)
Dept: HOSPITAL 61 - US | Age: 83
Discharge: HOME | End: 2018-09-04
Attending: UROLOGY
Payer: MEDICARE

## 2018-09-04 DIAGNOSIS — Z91.048: ICD-10-CM

## 2018-09-04 DIAGNOSIS — Z90.710: ICD-10-CM

## 2018-09-04 DIAGNOSIS — E78.00: ICD-10-CM

## 2018-09-04 DIAGNOSIS — Z88.2: ICD-10-CM

## 2018-09-04 DIAGNOSIS — Z88.5: ICD-10-CM

## 2018-09-04 DIAGNOSIS — Z88.8: ICD-10-CM

## 2018-09-04 DIAGNOSIS — E03.9: ICD-10-CM

## 2018-09-04 DIAGNOSIS — N28.1: Primary | ICD-10-CM

## 2018-09-04 DIAGNOSIS — Z90.49: ICD-10-CM

## 2018-09-04 DIAGNOSIS — I11.9: ICD-10-CM

## 2018-09-04 PROCEDURE — 76770 US EXAM ABDO BACK WALL COMP: CPT

## 2018-09-04 NOTE — RAD
Renal ultrasound 9/4/2018

 

INDICATION: Abnormal CT scan.

 

COMPARISON STUDY: CT of the abdomen and pelvis September 2, 2018.

 

Discussion: Ultrasound evaluation of the kidneys was performed. Static 

images are submitted to PACS. Right kidney measures 10.6 x 4.8 x 4.8 cm. 

Left kidney measures 12.1 x 4.8 x 5.3 cm. No hydronephrosis, 

nephrolithiasis, or evidence of obstructive uropathy is seen involving 

either kidney. There is a 1.6 cm cyst in superior pole of left kidney. No 

other focal renal lesions are identified. Limited visualization of the 

bladder is grossly unremarkable.

 

IMPRESSION: 1.6 cm cyst, superior pole left kidney. Otherwise unremarkable

sonographic appearance of the kidneys.

 

Electronically signed by: Ramin Lo MD (9/4/2018 4:37 PM) [id number]

## 2018-09-10 ENCOUNTER — HOSPITAL ENCOUNTER (INPATIENT)
Dept: HOSPITAL 61 - ER | Age: 83
LOS: 2 days | Discharge: HOME HEALTH | DRG: 689 | End: 2018-09-12
Attending: INTERNAL MEDICINE | Admitting: INTERNAL MEDICINE
Payer: MEDICARE

## 2018-09-10 VITALS — DIASTOLIC BLOOD PRESSURE: 75 MMHG | SYSTOLIC BLOOD PRESSURE: 156 MMHG

## 2018-09-10 VITALS — SYSTOLIC BLOOD PRESSURE: 147 MMHG | DIASTOLIC BLOOD PRESSURE: 64 MMHG

## 2018-09-10 VITALS — BODY MASS INDEX: 28.71 KG/M2 | WEIGHT: 168.19 LBS | HEIGHT: 64 IN

## 2018-09-10 DIAGNOSIS — N39.0: Primary | ICD-10-CM

## 2018-09-10 DIAGNOSIS — Z90.710: ICD-10-CM

## 2018-09-10 DIAGNOSIS — M54.5: ICD-10-CM

## 2018-09-10 DIAGNOSIS — Z82.49: ICD-10-CM

## 2018-09-10 DIAGNOSIS — Z66: ICD-10-CM

## 2018-09-10 DIAGNOSIS — Z88.2: ICD-10-CM

## 2018-09-10 DIAGNOSIS — Z88.1: ICD-10-CM

## 2018-09-10 DIAGNOSIS — Z87.440: ICD-10-CM

## 2018-09-10 DIAGNOSIS — E78.5: ICD-10-CM

## 2018-09-10 DIAGNOSIS — E78.00: ICD-10-CM

## 2018-09-10 DIAGNOSIS — G62.9: ICD-10-CM

## 2018-09-10 DIAGNOSIS — Z95.0: ICD-10-CM

## 2018-09-10 DIAGNOSIS — Z88.8: ICD-10-CM

## 2018-09-10 DIAGNOSIS — E03.9: ICD-10-CM

## 2018-09-10 DIAGNOSIS — M47.816: ICD-10-CM

## 2018-09-10 DIAGNOSIS — J96.00: ICD-10-CM

## 2018-09-10 DIAGNOSIS — Z88.5: ICD-10-CM

## 2018-09-10 DIAGNOSIS — M51.36: ICD-10-CM

## 2018-09-10 DIAGNOSIS — G89.29: ICD-10-CM

## 2018-09-10 DIAGNOSIS — Z91.041: ICD-10-CM

## 2018-09-10 DIAGNOSIS — I25.10: ICD-10-CM

## 2018-09-10 DIAGNOSIS — I10: ICD-10-CM

## 2018-09-10 DIAGNOSIS — M17.0: ICD-10-CM

## 2018-09-10 DIAGNOSIS — Z90.49: ICD-10-CM

## 2018-09-10 DIAGNOSIS — E05.90: ICD-10-CM

## 2018-09-10 LAB
ALBUMIN SERPL-MCNC: 3.1 G/DL (ref 3.4–5)
ALBUMIN/GLOB SERPL: 0.9 {RATIO} (ref 1–1.7)
ALP SERPL-CCNC: 63 U/L (ref 46–116)
ALT SERPL-CCNC: 19 U/L (ref 14–59)
ANION GAP SERPL CALC-SCNC: 4 MMOL/L (ref 6–14)
APTT PPP: (no result) S
AST SERPL-CCNC: 20 U/L (ref 15–37)
BACTERIA #/AREA URNS HPF: (no result) /HPF
BASOPHILS # BLD AUTO: 0.1 X10^3/UL (ref 0–0.2)
BASOPHILS NFR BLD: 1 % (ref 0–3)
BILIRUB SERPL-MCNC: 0.5 MG/DL (ref 0.2–1)
BILIRUB UR QL STRIP: NEGATIVE
BUN SERPL-MCNC: 9 MG/DL (ref 7–20)
BUN/CREAT SERPL: 10 (ref 6–20)
CALCIUM SERPL-MCNC: 8.9 MG/DL (ref 8.5–10.1)
CHLORIDE SERPL-SCNC: 104 MMOL/L (ref 98–107)
CO2 SERPL-SCNC: 28 MMOL/L (ref 21–32)
CREAT SERPL-MCNC: 0.9 MG/DL (ref 0.6–1)
EOSINOPHIL NFR BLD: 0.2 X10^3/UL (ref 0–0.7)
EOSINOPHIL NFR BLD: 3 % (ref 0–3)
ERYTHROCYTE [DISTWIDTH] IN BLOOD BY AUTOMATED COUNT: 14.3 % (ref 11.5–14.5)
FIBRINOGEN PPP-MCNC: CLEAR MG/DL
GFR SERPLBLD BASED ON 1.73 SQ M-ARVRAT: 59.2 ML/MIN
GLOBULIN SER-MCNC: 3.4 G/DL (ref 2.2–3.8)
GLUCOSE SERPL-MCNC: 111 MG/DL (ref 70–99)
HCT VFR BLD CALC: 42.9 % (ref 36–47)
HGB BLD-MCNC: 14.1 G/DL (ref 12–15.5)
HYALINE CASTS #/AREA URNS LPF: (no result) /HPF
LIPASE: 86 U/L (ref 73–393)
LYMPHOCYTES # BLD: 1.8 X10^3/UL (ref 1–4.8)
LYMPHOCYTES NFR BLD AUTO: 24 % (ref 24–48)
MCH RBC QN AUTO: 30 PG (ref 25–35)
MCHC RBC AUTO-ENTMCNC: 33 G/DL (ref 31–37)
MCV RBC AUTO: 90 FL (ref 79–100)
MONO #: 0.9 X10^3/UL (ref 0–1.1)
MONOCYTES NFR BLD: 12 % (ref 0–9)
NEUT #: 4.6 X10^3UL (ref 1.8–7.7)
NEUTROPHILS NFR BLD AUTO: 61 % (ref 31–73)
NITRITE UR QL STRIP: POSITIVE
PH UR STRIP: 7 [PH]
PLATELET # BLD AUTO: 221 X10^3/UL (ref 140–400)
POTASSIUM SERPL-SCNC: 4.5 MMOL/L (ref 3.5–5.1)
PROT SERPL-MCNC: 6.5 G/DL (ref 6.4–8.2)
PROT UR STRIP-MCNC: NEGATIVE MG/DL
RBC # BLD AUTO: 4.77 X10^6/UL (ref 3.5–5.4)
RBC #/AREA URNS HPF: 0 /HPF (ref 0–2)
SODIUM SERPL-SCNC: 136 MMOL/L (ref 136–145)
SQUAMOUS #/AREA URNS LPF: (no result) /LPF
UROBILINOGEN UR-MCNC: 1 MG/DL
WBC # BLD AUTO: 7.5 X10^3/UL (ref 4–11)
WBC #/AREA URNS HPF: (no result) /HPF (ref 0–4)
YEAST #/AREA URNS HPF: PRESENT /HPF

## 2018-09-10 PROCEDURE — 87086 URINE CULTURE/COLONY COUNT: CPT

## 2018-09-10 PROCEDURE — 96374 THER/PROPH/DIAG INJ IV PUSH: CPT

## 2018-09-10 PROCEDURE — 84484 ASSAY OF TROPONIN QUANT: CPT

## 2018-09-10 PROCEDURE — 83690 ASSAY OF LIPASE: CPT

## 2018-09-10 PROCEDURE — 76770 US EXAM ABDO BACK WALL COMP: CPT

## 2018-09-10 PROCEDURE — 72100 X-RAY EXAM L-S SPINE 2/3 VWS: CPT

## 2018-09-10 PROCEDURE — 36415 COLL VENOUS BLD VENIPUNCTURE: CPT

## 2018-09-10 PROCEDURE — 93005 ELECTROCARDIOGRAM TRACING: CPT

## 2018-09-10 PROCEDURE — 85025 COMPLETE CBC W/AUTO DIFF WBC: CPT

## 2018-09-10 PROCEDURE — 94618 PULMONARY STRESS TESTING: CPT

## 2018-09-10 PROCEDURE — 71045 X-RAY EXAM CHEST 1 VIEW: CPT

## 2018-09-10 PROCEDURE — 80053 COMPREHEN METABOLIC PANEL: CPT

## 2018-09-10 PROCEDURE — 96375 TX/PRO/DX INJ NEW DRUG ADDON: CPT

## 2018-09-10 PROCEDURE — 81001 URINALYSIS AUTO W/SCOPE: CPT

## 2018-09-10 NOTE — PHYS DOC
Past Medical History


Past Medical History:  High Cholesterol, Heart Disease, Hypertension, 

Hypothyroid, Other


Additional Past Medical Histor:  Freq.UTIs


Past Surgical History:  Appendectomy, Cholecystectomy, Hysterectomy, Pacemaker, 

Other


Additional Past Surgical Histo:  Back surgery,breast reduction,dental implants.


Alcohol Use:  None


Drug Use:  None





Adult General


Chief Complaint


Chief Complaint:  PAIN CONTROL





HPI


HPI





Patient is a 87  year old female presents to the ED complaining of right flank 

pain x 3 days. Patient has a history of back pain. States that a few weeks ago 

she had a back fracture and cement injected into her back to fix the problem. 

States she's been taking Percocet at home but the Percocet is not helping her 

pain. Describes the pain as sharp. Rates the pain as 9 out of 10. EMS concerned 

family is taking the pain medication. Patient given 30 tablets from [facility] 

[facility] on the 2nd of September and states she is out at home. Denies diarrhea, 

abdominal pain, chest pain, dizziness, weakness, headache, fever, dysuria or 

hematuria.





Review of Systems


Review of Systems





Constitutional: Denies fever or chills []


Eyes: Denies change in visual acuity, redness, or eye pain []


HENT: Denies nasal congestion or sore throat []


Respiratory: Denies cough or shortness of breath []


Cardiovascular: No additional information not addressed in HPI []


GI: Denies abdominal pain, nausea, vomiting, bloody stools or diarrhea []


: Denies dysuria or hematuria []


Musculoskeletal: Complains of back pain. Denies joint pain []


Integument: Denies rash or skin lesions []


Neurologic: Denies headache, focal weakness or sensory changes []





All other systems were reviewed and found to be within normal limits, except as 

documented in this note.





Current Medications


Current Medications





Current Medications








 Medications


  (Trade)  Dose


 Ordered  Sig/Aime  Start Time


 Stop Time Status Last Admin


Dose Admin


 


 Fentanyl Citrate


  (Fentanyl 2ml


 Vial)  50 mcg  1X  ONCE  9/10/18 16:15


 9/10/18 16:24 DC  





 


 Morphine Sulfate


  (Morphine


 Sulfate)  4 mg  1X  ONCE  9/10/18 16:30


 9/10/18 16:31 DC 9/10/18 17:28


4 MG


 


 Ondansetron HCl


  (Zofran)  4 mg  1X  ONCE  9/10/18 16:30


 9/10/18 16:31 DC 9/10/18 17:27


4 MG











Allergies


Allergies





Allergies








Coded Allergies Type Severity Reaction Last Updated Verified


 


  Sulfa (Sulfonamide Antibiotics) Allergy Intermediate Unknown 8/23/18 Yes


 


  adhesive tape Allergy Intermediate Hives 8/23/18 Yes


 


  erythromycin base Allergy Intermediate Swelling. 8/23/18 Yes


 


  iodine Allergy Intermediate Swelling. 8/23/18 Yes


 


  meperidine Allergy Intermediate Itch,N/V,Swelling 8/23/18 Yes


 


  codeine Adverse Reaction Intermediate "Builds up-makes me crazy" 9/2/18 Yes


 


  hydrocodone Adverse Reaction Intermediate "Builds up-makes me crazy." 9/2/18 

Yes











Physical Exam


Physical Exam





Constitutional: Well developed, well nourished, no acute distress, non-toxic 

appearance. []


HENT: Normocephalic, atraumatic, bilateral external ears normal, oropharynx 

moist, no oral exudates, nose normal. []


Eyes: PERRLA, EOMI, conjunctiva normal, no discharge. [] 


Neck: Normal range of motion, no tenderness, supple, no stridor. [] 


Cardiovascular:Heart rate regular rhythm, no murmur []


Lungs & Thorax:  Bilateral breath sounds clear to auscultation []


Abdomen: Bowel sounds normal, soft, no tenderness, no masses, no pulsatile 

masses. [] 


Skin: Warm, dry, no erythema, no rash. [] 


Back: No tenderness, no CVA tenderness. [] 


Extremities: No tenderness, no cyanosis, no clubbing, ROM intact, no edema. [] 


Neurologic: Alert and oriented X 3, normal motor function, normal sensory 

function, no focal deficits noted. []


Psychologic: Affect normal, judgement normal, mood normal. []





Current Patient Data


Vital Signs





 Vital Signs








  Date Time  Temp Pulse Resp B/P (MAP) Pulse Ox O2 Delivery O2 Flow Rate FiO2


 


9/10/18 18:14  78 18  91   


 


9/10/18 17:28      Nasal Cannula 3.0 


 


9/10/18 16:14 98.3   196/102 (133)    





 98.3       








Lab Values





 Laboratory Tests








Test


 9/10/18


17:25 9/10/18


17:55


 


White Blood Count


 7.5 x10^3/uL


(4.0-11.0) 





 


Red Blood Count


 4.77 x10^6/uL


(3.50-5.40) 





 


Hemoglobin


 14.1 g/dL


(12.0-15.5) 





 


Hematocrit


 42.9 %


(36.0-47.0) 





 


Mean Corpuscular Volume


 90 fL ()


 





 


Mean Corpuscular Hemoglobin 30 pg (25-35)   


 


Mean Corpuscular Hemoglobin


Concent 33 g/dL


(31-37) 





 


Red Cell Distribution Width


 14.3 %


(11.5-14.5) 





 


Platelet Count


 221 x10^3/uL


(140-400) 





 


Neutrophils (%) (Auto) 61 % (31-73)   


 


Lymphocytes (%) (Auto) 24 % (24-48)   


 


Monocytes (%) (Auto) 12 % (0-9)  H 


 


Eosinophils (%) (Auto) 3 % (0-3)   


 


Basophils (%) (Auto) 1 % (0-3)   


 


Neutrophils # (Auto)


 4.6 x10^3uL


(1.8-7.7) 





 


Lymphocytes # (Auto)


 1.8 x10^3/uL


(1.0-4.8) 





 


Monocytes # (Auto)


 0.9 x10^3/uL


(0.0-1.1) 





 


Eosinophils # (Auto)


 0.2 x10^3/uL


(0.0-0.7) 





 


Basophils # (Auto)


 0.1 x10^3/uL


(0.0-0.2) 





 


Sodium Level


 136 mmol/L


(136-145) 





 


Potassium Level


 4.5 mmol/L


(3.5-5.1) 





 


Chloride Level


 104 mmol/L


() 





 


Carbon Dioxide Level


 28 mmol/L


(21-32) 





 


Anion Gap 4 (6-14)  L 


 


Blood Urea Nitrogen


 9 mg/dL (7-20)


 





 


Creatinine


 0.9 mg/dL


(0.6-1.0) 





 


Estimated GFR


(Cockcroft-Gault) 59.2  


 





 


BUN/Creatinine Ratio 10 (6-20)   


 


Glucose Level


 111 mg/dL


(70-99)  H 





 


Calcium Level


 8.9 mg/dL


(8.5-10.1) 





 


Total Bilirubin


 0.5 mg/dL


(0.2-1.0) 





 


Aspartate Amino Transferase


(AST) 20 U/L (15-37)


 





 


Alanine Aminotransferase (ALT)


 19 U/L (14-59)


 





 


Alkaline Phosphatase


 63 U/L


() 





 


Troponin I Quantitative


 < 0.017 ng/mL


(0.000-0.055) 





 


Total Protein


 6.5 g/dL


(6.4-8.2) 





 


Albumin


 3.1 g/dL


(3.4-5.0)  L 





 


Albumin/Globulin Ratio


 0.9 (1.0-1.7)


L 





 


Lipase


 86 U/L


() 





 


Urine Collection Type  Void  


 


Urine Color  Orange  


 


Urine Clarity  Clear  


 


Urine pH  7.0  


 


Urine Specific Gravity  1.020  


 


Urine Protein


 


 Negative mg/dL


(NEG-TRACE)


 


Urine Glucose (UA)


 


 Negative mg/dL


(NEG)


 


Urine Ketones (Stick)


 


 Negative mg/dL


(NEG)


 


Urine Blood


 


 Negative (NEG)





 


Urine Nitrite


 


 Positive (NEG)





 


Urine Bilirubin


 


 Negative (NEG)





 


Urine Urobilinogen Dipstick


 


 1.0 mg/dL (0.2


mg/dL)


 


Urine Leukocyte Esterase  Small (NEG)  


 


Urine RBC  0 /HPF (0-2)  


 


Urine WBC


 


 20-40 /HPF


(0-4)


 


Urine Squamous Epithelial


Cells 


 Mod /LPF  





 


Urine Bacteria


 


 Mod /HPF


(0-FEW)


 


Urine Hyaline Casts


 


 Occasional


/HPF


 


Urine Mucus  Slight /LPF  


 


Urine Yeast  Present /HPF  





 Laboratory Tests


9/10/18 17:25








 Laboratory Tests


9/10/18 17:25














Microbiology


9/10/18 Urine Culture - Preliminary, Resulted


          


9/10/18 Urine Culture Result 1 ([facility]) - Preliminary, Resulted





EKG


EKG


[]





Radiology/Procedures


Radiology/Procedures


PROCEDURE: CHEST AP ONLY





 


EXAM: CHEST 1 VIEW 


 


History: Chest pain


 


COMPARISON: None available.


 


TECHNIQUE: Single portable radiograph of the chest


 


FINDINGS:  The cardiac silhouette is unremarkable. The lungs are clear 


bilaterally. The costophrenic sulci are clear and well demarcated. 


Left-sided cardiac pacer is identified. Kyphoplasty changes identified in 


the lower thoracic vertebral body.


 


IMPRESSION:  No radiographic evidence of an acute cardiopulmonary process.[]





Course & Med Decision Making


Course & Med Decision Making


Pertinent Labs and Imaging studies reviewed. (See chart for details)





Pain controlled in the ED.


[]Discussed case with hospitalist, Dr. Duval. Agrees to admission and further 

management patient. Patient stable for admission.





Dragon Disclaimer


Dragon Disclaimer


This electronic medical record was generated, in whole or in part, using a 

voice recognition dictation system.





Departure


Departure


Impression:  


 Primary Impression:  


 Back pain


Disposition:  09 ADMITTED AS INPATIENT


Condition:  STABLE


Referrals:  


FREDA RICO MD (PCP)


Scripts


Nitrofurantoin Monohyd/M-Cryst (NITROFURANTOIN MONO- MG) 100 Mg Capsule


1 CAP PO BID, #14 CAP


   Prov: JOHANNA ALONSO MD         9/12/18











EVANGELINA RODRIGUEZ Sep 10, 2018 16:20


KELL CRUZ MD Sep 13, 2018 02:12

## 2018-09-10 NOTE — RAD
EXAM: CHEST 1 VIEW 

 

History: Chest pain

 

COMPARISON: None available.

 

TECHNIQUE: Single portable radiograph of the chest

 

FINDINGS:  The cardiac silhouette is unremarkable. The lungs are clear 

bilaterally. The costophrenic sulci are clear and well demarcated. 

Left-sided cardiac pacer is identified. Kyphoplasty changes identified in 

the lower thoracic vertebral body.

 

IMPRESSION:  No radiographic evidence of an acute cardiopulmonary process.

 

 

Electronically signed by: Giovanny Handley MD (9/10/2018 5:00 PM) [id number]

## 2018-09-11 VITALS — SYSTOLIC BLOOD PRESSURE: 156 MMHG | DIASTOLIC BLOOD PRESSURE: 74 MMHG

## 2018-09-11 VITALS — DIASTOLIC BLOOD PRESSURE: 87 MMHG | SYSTOLIC BLOOD PRESSURE: 157 MMHG

## 2018-09-11 VITALS — DIASTOLIC BLOOD PRESSURE: 55 MMHG | SYSTOLIC BLOOD PRESSURE: 158 MMHG

## 2018-09-11 VITALS — SYSTOLIC BLOOD PRESSURE: 172 MMHG | DIASTOLIC BLOOD PRESSURE: 95 MMHG

## 2018-09-11 VITALS — SYSTOLIC BLOOD PRESSURE: 168 MMHG | DIASTOLIC BLOOD PRESSURE: 83 MMHG

## 2018-09-11 VITALS — DIASTOLIC BLOOD PRESSURE: 66 MMHG | SYSTOLIC BLOOD PRESSURE: 166 MMHG

## 2018-09-11 LAB
ALBUMIN SERPL-MCNC: 2.7 G/DL (ref 3.4–5)
ALBUMIN/GLOB SERPL: 0.8 {RATIO} (ref 1–1.7)
ALP SERPL-CCNC: 61 U/L (ref 46–116)
ALT SERPL-CCNC: 25 U/L (ref 14–59)
ANION GAP SERPL CALC-SCNC: 3 MMOL/L (ref 6–14)
AST SERPL-CCNC: 27 U/L (ref 15–37)
BASOPHILS # BLD AUTO: 0.1 X10^3/UL (ref 0–0.2)
BASOPHILS NFR BLD: 1 % (ref 0–3)
BILIRUB SERPL-MCNC: 0.5 MG/DL (ref 0.2–1)
BUN SERPL-MCNC: 9 MG/DL (ref 7–20)
BUN/CREAT SERPL: 10 (ref 6–20)
CALCIUM SERPL-MCNC: 8.4 MG/DL (ref 8.5–10.1)
CHLORIDE SERPL-SCNC: 105 MMOL/L (ref 98–107)
CO2 SERPL-SCNC: 31 MMOL/L (ref 21–32)
CREAT SERPL-MCNC: 0.9 MG/DL (ref 0.6–1)
EOSINOPHIL NFR BLD: 0.2 X10^3/UL (ref 0–0.7)
EOSINOPHIL NFR BLD: 5 % (ref 0–3)
ERYTHROCYTE [DISTWIDTH] IN BLOOD BY AUTOMATED COUNT: 14.5 % (ref 11.5–14.5)
GFR SERPLBLD BASED ON 1.73 SQ M-ARVRAT: 59.2 ML/MIN
GLOBULIN SER-MCNC: 3.2 G/DL (ref 2.2–3.8)
GLUCOSE SERPL-MCNC: 94 MG/DL (ref 70–99)
HCT VFR BLD CALC: 39.7 % (ref 36–47)
HGB BLD-MCNC: 13.3 G/DL (ref 12–15.5)
LYMPHOCYTES # BLD: 1.4 X10^3/UL (ref 1–4.8)
LYMPHOCYTES NFR BLD AUTO: 26 % (ref 24–48)
MCH RBC QN AUTO: 30 PG (ref 25–35)
MCHC RBC AUTO-ENTMCNC: 34 G/DL (ref 31–37)
MCV RBC AUTO: 91 FL (ref 79–100)
MONO #: 0.6 X10^3/UL (ref 0–1.1)
MONOCYTES NFR BLD: 12 % (ref 0–9)
NEUT #: 3 X10^3UL (ref 1.8–7.7)
NEUTROPHILS NFR BLD AUTO: 57 % (ref 31–73)
PLATELET # BLD AUTO: 199 X10^3/UL (ref 140–400)
POTASSIUM SERPL-SCNC: 3.9 MMOL/L (ref 3.5–5.1)
PROT SERPL-MCNC: 5.9 G/DL (ref 6.4–8.2)
RBC # BLD AUTO: 4.39 X10^6/UL (ref 3.5–5.4)
SODIUM SERPL-SCNC: 139 MMOL/L (ref 136–145)
WBC # BLD AUTO: 5.3 X10^3/UL (ref 4–11)

## 2018-09-11 RX ADMIN — POTASSIUM CHLORIDE SCH MEQ: 750 TABLET, FILM COATED, EXTENDED RELEASE ORAL at 08:34

## 2018-09-11 RX ADMIN — RISPERIDONE SCH MG: 0.25 TABLET ORAL at 08:34

## 2018-09-11 RX ADMIN — LEVOTHYROXINE SODIUM SCH MCG: 75 TABLET ORAL at 06:09

## 2018-09-11 RX ADMIN — CARVEDILOL SCH MG: 3.12 TABLET, FILM COATED ORAL at 08:35

## 2018-09-11 RX ADMIN — POTASSIUM CHLORIDE SCH MEQ: 750 TABLET, FILM COATED, EXTENDED RELEASE ORAL at 16:58

## 2018-09-11 RX ADMIN — RISPERIDONE SCH MG: 0.25 TABLET ORAL at 20:05

## 2018-09-11 RX ADMIN — OXYCODONE HYDROCHLORIDE AND ACETAMINOPHEN PRN TAB: 5; 325 TABLET ORAL at 16:59

## 2018-09-11 RX ADMIN — I-VITE, TAB 1000-60-2MG (60/BT) SCH TAB: TAB at 08:34

## 2018-09-11 RX ADMIN — ACETAMINOPHEN PRN MG: 500 TABLET ORAL at 20:05

## 2018-09-11 RX ADMIN — ASPIRIN SCH MG: 325 TABLET, DELAYED RELEASE ORAL at 08:34

## 2018-09-11 RX ADMIN — VENLAFAXINE SCH MG: 50 TABLET ORAL at 20:05

## 2018-09-11 RX ADMIN — VENLAFAXINE SCH MG: 50 TABLET ORAL at 08:34

## 2018-09-11 RX ADMIN — CARVEDILOL SCH MG: 3.12 TABLET, FILM COATED ORAL at 16:59

## 2018-09-11 RX ADMIN — ACETAMINOPHEN PRN MG: 500 TABLET ORAL at 13:31

## 2018-09-11 RX ADMIN — CEFTRIAXONE SCH GM: 1 INJECTION, POWDER, FOR SOLUTION INTRAMUSCULAR; INTRAVENOUS at 00:18

## 2018-09-11 NOTE — HP
ADMIT DATE:  09/10/2018



CHIEF COMPLAINT:  Back pain.



HISTORY OF PRESENT ILLNESS:  The patient is a pleasant 87-year-old female who

was at home with her son and daughter.  Basically, she had some surgery on her

back recently.  I think it was for compression fracture.  She probably had a

kyphoplasty.  She was sent home with 30 Percocet, but now, she is out of them. 

She states the pain is not helping.  When the EMS was called and they arrived,

they were concerned maybe the family could be taking some of her medicine, not

really clear on that.  Her family members are here.  Interestingly, they do have

a lot of questions about what meds she is going to go home on and should they

take her home meds back home with them.  Nevertheless, I have discussed the case

with the ER physician.  We are going to admit the patient and probably work on

getting her placed in a long-term care.



PAST MEDICAL HISTORY:  Hyperlipidemia, coronary artery disease, hypertension,

hypothyroidism, frequent UTIs, cholecystectomy, appendectomy, hysterectomy,

pacemaker, back surgeries, dental implants.



ALLERGIES:  SULFA, CODEINE, TAPE, ERYTHROMYCIN, HYDROCODONE, IODINE, MEPERIDINE.



FAMILY HISTORY:  Coronary artery disease.



SOCIAL HISTORY:  She lives at home with her son and daughter.  She does not

drink, smoke or take drugs.



MEDICATIONS:  Reviewed, please refer to the MRAD.



REVIEW OF SYSTEMS:  

GENERAL:  No history of weight change, weakness or fevers.

SKIN:  No bruising, hair changes or rashes.

EYES:  No blurred, double or loss of vision.

NOSE AND THROAT:  No history of nosebleeds, hoarseness or sore throat.

HEART:  No history of palpitations, chest pain or shortness of breath on

exertion.

LUNGS:  Denies cough, hemoptysis, wheezing or shortness of breath.

GASTROINTESTINAL:  Denies changes in appetite, nausea, vomiting, diarrhea or

constipation.

GENITOURINARY:  No history of frequency, urgency, hesitancy or nocturia.

NEUROLOGIC:  Denies history of numbness, tingling, tremor or weakness.

PSYCHIATRIC:  No history of panic, anxiety or depression.

ENDOCRINE:  No history of heat or cold intolerance, polyuria or polydipsia.

EXTREMITIES:  Denies muscle weakness, joint pain, pain on walking or stiffness.

MUSCULOSKELETAL:  She complains of back pain.

 

PHYSICAL EXAMINATION:

VITAL SIGNS:  Temperature afebrile, pulse 80, respirations 18, blood pressure

113/70.

GENERAL:  She is alert, cooperative, weak, intermittently confused.

HEART:  Normal S1, S2.

LUNGS:  Clear.

ABDOMEN:  Soft.

EXTREMITIES:  Trace edema.

SKIN:  No rashes.

ENDOCRINE:  No thyromegaly.

LYMPHATICS:  No cervical nodes.

HEMATOPOIETIC:  No bruising.



LABORATORY DATA:  Hematology is normal.  Electrolytes are normal.  Troponin is

0.  Urinalysis, small amount of leukocyte esterase with 20-40 white cells.



ASSESSMENT AND PLAN:  Intractable pain with incidental finding of urinary tract

infection.  The patient has been admitted.  We will get her back on her pain

meds, p.r.n. IV morphine.  We will start IV Levaquin for her urinary tract

infection.  PT, OT, wound care.  Frequent labs.   for longterm

care placement.

 



______________________________

KHARI LUCAS DO



DR:  DAE/edda  JOB#:  [id number] / [id number]

DD:  09/10/2018 23:27  DT:  09/11/2018 00:24

## 2018-09-11 NOTE — CONS
DATE OF CONSULTATION:  09/11/2018



I saw her at the request of Dr. Head on 09/11/2018.



HISTORY OF PRESENT ILLNESS:  This is an 87-year-old right-handed female who

moved to [facility] from [place], [place] to live with her son and

daughter-in-law.  The patient was admitted with increased back pain.  She had

lumbar laminectomy in the past and also she had kyphoplasty for lower thoracic

vertebral body compression fracture.  The patient apparently was given Percocet

for pain, but she ran out of them.  The patient was admitted through the

Emergency Room with back pain.  She denies any radiation of pain to the

extremities or any tingling or numbness sensation in the extremities or any

trouble with her bowel or bladder control.  The patient had no steps for her to

manage.  She usually walks using a walker.



PAST MEDICAL HISTORY:  Includes hyperlipidemia, coronary artery disease,

hypertension, hyperthyroidism, frequent urinary tract infections,

cholecystectomy, appendectomy, hysterectomy, permanent pacemaker placement, back

surgery and also dental implants.



ALLERGIES:  SHE IS KNOWN ALLERGIC TO SULFA, CODEINE, TAPE, ERYTHROMYCIN,

HYDROCODONE, IODINE AND MEPERIDINE.



FAMILY HISTORY:  Coronary artery disease.



IMAGING DATA:  I have obtained x-rays of her lumbar spine, which revealed

moderate multilevel degenerative changes, previous laminectomy at L5-S1, mild

anterolisthesis at L4-L5 due to past joint arthropathy and vertebroplasty

changes at T12 and T9 and chest x-ray done yesterday failed to reveal any acute

abnormality.



PHYSICAL EXAMINATION:  Today revealed an elderly female.  She is alert; oriented

to time, place, person and circumstance and follows commands appropriately. 

Moves all 4 extremities voluntarily, where she had 4+/5 grade muscle strength

and deep tendon reflexes are decreased overall, with absent knee and ankle jerks

and she had equal perception of touch and pinprick sensation bilaterally.  She

had crepitus on range of motion of both knee joints with mild knee joint

effusion and she had pain-free range of motion on both hip joints.  She had

tenderness to palpation over the lumbar paraspinal muscles, extending over to

sacroiliac joint area and straight leg raising test is negative bilaterally.  No

significant lumbar paraspinal muscle spasm was noted at this time.  She is

independent with bed mobility and transfers.  Once up, she can walk using a

roller walker.  She did not complain of any back pain while using a lumbar

corset while up.  Her skin is intact at this time.



ASSESSMENT:  An elderly female with chronic lower back pain from degenerative

disk disease and degenerative joint disease of lumbar vertebrae, status post

previous lumbar spine surgery and also kyphoplasty of T9 and T12 vertebral

bodies and clinical evidence of peripheral neuropathy and degenerative joint

disease of both knees, without much pain.  The patient with known

hyperlipidemia, coronary artery disease, hypertension, hypothyroidism, frequent

urinary tract infections, status post cholecystectomy, appendectomy,

hysterectomy and permanent pacemaker placement.



RECOMMENDATION:  To ask Physical Therapy to try physical modalities and to get

her up as tolerated.  Home when medically stable, with home health followup.



Dr. Head, I appreciate asking me to participate in the care of this

interesting patient.  I will be glad to follow her with you as needed for

rehabilitation, to consider trigger point injection if the back pain persists.

 



______________________________

PASCALE PALMER MD



DR:  JAMEY/edda  JOB#:  [id number] / [id number]

DD:  09/11/2018 16:25  DT:  09/11/2018 23:11

## 2018-09-11 NOTE — PDOC
PROGRESS NOTES


Chief Complaint


Chief Complaint


Acute on  chronic back pain


UTI


Generalized weakness, frailty


multiple allergies


DNR





History of Present Illness


History of Present Illness


SHe complains of back pain


She claims to me on adhesive tape, she gets hives so I held off Lidoderm patch


Family tells staff that she supposed to have MRI without contrast-allergy to 

contrast 


No recent falls, no history of cancer that I could see





Lives at home with family, she has a cane walker, she is not using





Plan: Consult physiatry re back pain


MRI thoracolumbar without contrast


Pain control


Home meds have reconciled


I did verify CODE STATUS with her, she wishes to be DNR





Vitals


Vitals





Vital Signs








  Date Time  Temp Pulse Resp B/P (MAP) Pulse Ox O2 Delivery O2 Flow Rate FiO2


 


9/11/18 08:38  85  172/95    


 


9/11/18 07:53      Nasal Cannula 3.0 


 


9/11/18 07:00 97.5  20  93   





 97.5       











Physical Exam


General:  Alert, Oriented X3, Cooperative, No acute distress


Heart:  Regular rate, Normal S1, Normal S2, No murmurs


Lungs:  Clear


Abdomen:  Normal bowel sounds, Soft, No tenderness


Extremities:  No clubbing, No cyanosis, No edema


Skin:  No rashes, No breakdown, No significant lesion





Labs


LABS





Laboratory Tests








Test


 9/10/18


17:25 9/10/18


17:55 9/11/18


05:25


 


White Blood Count


 7.5 x10^3/uL


(4.0-11.0) 


 5.3 x10^3/uL


(4.0-11.0)


 


Red Blood Count


 4.77 x10^6/uL


(3.50-5.40) 


 4.39 x10^6/uL


(3.50-5.40)


 


Hemoglobin


 14.1 g/dL


(12.0-15.5) 


 13.3 g/dL


(12.0-15.5)


 


Hematocrit


 42.9 %


(36.0-47.0) 


 39.7 %


(36.0-47.0)


 


Mean Corpuscular Volume 90 fL ()   91 fL () 


 


Mean Corpuscular Hemoglobin 30 pg (25-35)   30 pg (25-35) 


 


Mean Corpuscular Hemoglobin


Concent 33 g/dL


(31-37) 


 34 g/dL


(31-37)


 


Red Cell Distribution Width


 14.3 %


(11.5-14.5) 


 14.5 %


(11.5-14.5)


 


Platelet Count


 221 x10^3/uL


(140-400) 


 199 x10^3/uL


(140-400)


 


Neutrophils (%) (Auto) 61 % (31-73)   57 % (31-73) 


 


Lymphocytes (%) (Auto) 24 % (24-48)   26 % (24-48) 


 


Monocytes (%) (Auto) 12 % (0-9)   12 % (0-9) 


 


Eosinophils (%) (Auto) 3 % (0-3)   5 % (0-3) 


 


Basophils (%) (Auto) 1 % (0-3)   1 % (0-3) 


 


Neutrophils # (Auto)


 4.6 x10^3uL


(1.8-7.7) 


 3.0 x10^3uL


(1.8-7.7)


 


Lymphocytes # (Auto)


 1.8 x10^3/uL


(1.0-4.8) 


 1.4 x10^3/uL


(1.0-4.8)


 


Monocytes # (Auto)


 0.9 x10^3/uL


(0.0-1.1) 


 0.6 x10^3/uL


(0.0-1.1)


 


Eosinophils # (Auto)


 0.2 x10^3/uL


(0.0-0.7) 


 0.2 x10^3/uL


(0.0-0.7)


 


Basophils # (Auto)


 0.1 x10^3/uL


(0.0-0.2) 


 0.1 x10^3/uL


(0.0-0.2)


 


Sodium Level


 136 mmol/L


(136-145) 


 139 mmol/L


(136-145)


 


Potassium Level


 4.5 mmol/L


(3.5-5.1) 


 3.9 mmol/L


(3.5-5.1)


 


Chloride Level


 104 mmol/L


() 


 105 mmol/L


()


 


Carbon Dioxide Level


 28 mmol/L


(21-32) 


 31 mmol/L


(21-32)


 


Anion Gap 4 (6-14)   3 (6-14) 


 


Blood Urea Nitrogen 9 mg/dL (7-20)   9 mg/dL (7-20) 


 


Creatinine


 0.9 mg/dL


(0.6-1.0) 


 0.9 mg/dL


(0.6-1.0)


 


Estimated GFR


(Cockcroft-Gault) 59.2 


 


 59.2 





 


BUN/Creatinine Ratio 10 (6-20)   10 (6-20) 


 


Glucose Level


 111 mg/dL


(70-99) 


 94 mg/dL


(70-99)


 


Calcium Level


 8.9 mg/dL


(8.5-10.1) 


 8.4 mg/dL


(8.5-10.1)


 


Total Bilirubin


 0.5 mg/dL


(0.2-1.0) 


 0.5 mg/dL


(0.2-1.0)


 


Aspartate Amino Transf


(AST/SGOT) 20 U/L (15-37) 


 


 27 U/L (15-37) 





 


Alanine Aminotransferase


(ALT/SGPT) 19 U/L (14-59) 


 


 25 U/L (14-59) 





 


Alkaline Phosphatase


 63 U/L


() 


 61 U/L


()


 


Troponin I Quantitative


 < 0.017 ng/mL


(0.000-0.055) 


 





 


Total Protein


 6.5 g/dL


(6.4-8.2) 


 5.9 g/dL


(6.4-8.2)


 


Albumin


 3.1 g/dL


(3.4-5.0) 


 2.7 g/dL


(3.4-5.0)


 


Albumin/Globulin Ratio 0.9 (1.0-1.7)   0.8 (1.0-1.7) 


 


Lipase


 86 U/L


() 


 





 


Urine Collection Type  Void  


 


Urine Color  Orange  


 


Urine Clarity  Clear  


 


Urine pH  7.0  


 


Urine Specific Gravity  1.020  


 


Urine Protein


 


 Negative mg/dL


(NEG-TRACE) 





 


Urine Glucose (UA)


 


 Negative mg/dL


(NEG) 





 


Urine Ketones (Stick)


 


 Negative mg/dL


(NEG) 





 


Urine Blood  Negative (NEG)  


 


Urine Nitrite  Positive (NEG)  


 


Urine Bilirubin  Negative (NEG)  


 


Urine Urobilinogen Dipstick


 


 1.0 mg/dL (0.2


mg/dL) 





 


Urine Leukocyte Esterase  Small (NEG)  


 


Urine RBC  0 /HPF (0-2)  


 


Urine WBC


 


 20-40 /HPF


(0-4) 





 


Urine Squamous Epithelial


Cells 


 Mod /LPF 


 





 


Urine Bacteria


 


 Mod /HPF


(0-FEW) 





 


Urine Hyaline Casts


 


 Occasional


/HPF 





 


Urine Mucus  Slight /LPF  


 


Urine Yeast  Present /HPF  











Review of Systems


Review of Systems


back Pain, the rest of ROS 14 point negative


Positive for generalized weakness





Assessment and Plan


Assessmemt and Plan


Problems


Medical Problems:


(1) Back pain


Status: Acute  











Comment


Review of Relevant


I have reviewed the following items fady (where applicable) has been applied.


Labs





Laboratory Tests








Test


 9/10/18


17:25 9/10/18


17:55 9/11/18


05:25


 


White Blood Count


 7.5 x10^3/uL


(4.0-11.0) 


 5.3 x10^3/uL


(4.0-11.0)


 


Red Blood Count


 4.77 x10^6/uL


(3.50-5.40) 


 4.39 x10^6/uL


(3.50-5.40)


 


Hemoglobin


 14.1 g/dL


(12.0-15.5) 


 13.3 g/dL


(12.0-15.5)


 


Hematocrit


 42.9 %


(36.0-47.0) 


 39.7 %


(36.0-47.0)


 


Mean Corpuscular Volume 90 fL ()   91 fL () 


 


Mean Corpuscular Hemoglobin 30 pg (25-35)   30 pg (25-35) 


 


Mean Corpuscular Hemoglobin


Concent 33 g/dL


(31-37) 


 34 g/dL


(31-37)


 


Red Cell Distribution Width


 14.3 %


(11.5-14.5) 


 14.5 %


(11.5-14.5)


 


Platelet Count


 221 x10^3/uL


(140-400) 


 199 x10^3/uL


(140-400)


 


Neutrophils (%) (Auto) 61 % (31-73)   57 % (31-73) 


 


Lymphocytes (%) (Auto) 24 % (24-48)   26 % (24-48) 


 


Monocytes (%) (Auto) 12 % (0-9)   12 % (0-9) 


 


Eosinophils (%) (Auto) 3 % (0-3)   5 % (0-3) 


 


Basophils (%) (Auto) 1 % (0-3)   1 % (0-3) 


 


Neutrophils # (Auto)


 4.6 x10^3uL


(1.8-7.7) 


 3.0 x10^3uL


(1.8-7.7)


 


Lymphocytes # (Auto)


 1.8 x10^3/uL


(1.0-4.8) 


 1.4 x10^3/uL


(1.0-4.8)


 


Monocytes # (Auto)


 0.9 x10^3/uL


(0.0-1.1) 


 0.6 x10^3/uL


(0.0-1.1)


 


Eosinophils # (Auto)


 0.2 x10^3/uL


(0.0-0.7) 


 0.2 x10^3/uL


(0.0-0.7)


 


Basophils # (Auto)


 0.1 x10^3/uL


(0.0-0.2) 


 0.1 x10^3/uL


(0.0-0.2)


 


Sodium Level


 136 mmol/L


(136-145) 


 139 mmol/L


(136-145)


 


Potassium Level


 4.5 mmol/L


(3.5-5.1) 


 3.9 mmol/L


(3.5-5.1)


 


Chloride Level


 104 mmol/L


() 


 105 mmol/L


()


 


Carbon Dioxide Level


 28 mmol/L


(21-32) 


 31 mmol/L


(21-32)


 


Anion Gap 4 (6-14)   3 (6-14) 


 


Blood Urea Nitrogen 9 mg/dL (7-20)   9 mg/dL (7-20) 


 


Creatinine


 0.9 mg/dL


(0.6-1.0) 


 0.9 mg/dL


(0.6-1.0)


 


Estimated GFR


(Cockcroft-Gault) 59.2 


 


 59.2 





 


BUN/Creatinine Ratio 10 (6-20)   10 (6-20) 


 


Glucose Level


 111 mg/dL


(70-99) 


 94 mg/dL


(70-99)


 


Calcium Level


 8.9 mg/dL


(8.5-10.1) 


 8.4 mg/dL


(8.5-10.1)


 


Total Bilirubin


 0.5 mg/dL


(0.2-1.0) 


 0.5 mg/dL


(0.2-1.0)


 


Aspartate Amino Transf


(AST/SGOT) 20 U/L (15-37) 


 


 27 U/L (15-37) 





 


Alanine Aminotransferase


(ALT/SGPT) 19 U/L (14-59) 


 


 25 U/L (14-59) 





 


Alkaline Phosphatase


 63 U/L


() 


 61 U/L


()


 


Troponin I Quantitative


 < 0.017 ng/mL


(0.000-0.055) 


 





 


Total Protein


 6.5 g/dL


(6.4-8.2) 


 5.9 g/dL


(6.4-8.2)


 


Albumin


 3.1 g/dL


(3.4-5.0) 


 2.7 g/dL


(3.4-5.0)


 


Albumin/Globulin Ratio 0.9 (1.0-1.7)   0.8 (1.0-1.7) 


 


Lipase


 86 U/L


() 


 





 


Urine Collection Type  Void  


 


Urine Color  Orange  


 


Urine Clarity  Clear  


 


Urine pH  7.0  


 


Urine Specific Gravity  1.020  


 


Urine Protein


 


 Negative mg/dL


(NEG-TRACE) 





 


Urine Glucose (UA)


 


 Negative mg/dL


(NEG) 





 


Urine Ketones (Stick)


 


 Negative mg/dL


(NEG) 





 


Urine Blood  Negative (NEG)  


 


Urine Nitrite  Positive (NEG)  


 


Urine Bilirubin  Negative (NEG)  


 


Urine Urobilinogen Dipstick


 


 1.0 mg/dL (0.2


mg/dL) 





 


Urine Leukocyte Esterase  Small (NEG)  


 


Urine RBC  0 /HPF (0-2)  


 


Urine WBC


 


 20-40 /HPF


(0-4) 





 


Urine Squamous Epithelial


Cells 


 Mod /LPF 


 





 


Urine Bacteria


 


 Mod /HPF


(0-FEW) 





 


Urine Hyaline Casts


 


 Occasional


/HPF 





 


Urine Mucus  Slight /LPF  


 


Urine Yeast  Present /HPF  








Laboratory Tests








Test


 9/10/18


17:25 9/10/18


17:55 9/11/18


05:25


 


White Blood Count


 7.5 x10^3/uL


(4.0-11.0) 


 5.3 x10^3/uL


(4.0-11.0)


 


Red Blood Count


 4.77 x10^6/uL


(3.50-5.40) 


 4.39 x10^6/uL


(3.50-5.40)


 


Hemoglobin


 14.1 g/dL


(12.0-15.5) 


 13.3 g/dL


(12.0-15.5)


 


Hematocrit


 42.9 %


(36.0-47.0) 


 39.7 %


(36.0-47.0)


 


Mean Corpuscular Volume 90 fL ()   91 fL () 


 


Mean Corpuscular Hemoglobin 30 pg (25-35)   30 pg (25-35) 


 


Mean Corpuscular Hemoglobin


Concent 33 g/dL


(31-37) 


 34 g/dL


(31-37)


 


Red Cell Distribution Width


 14.3 %


(11.5-14.5) 


 14.5 %


(11.5-14.5)


 


Platelet Count


 221 x10^3/uL


(140-400) 


 199 x10^3/uL


(140-400)


 


Neutrophils (%) (Auto) 61 % (31-73)   57 % (31-73) 


 


Lymphocytes (%) (Auto) 24 % (24-48)   26 % (24-48) 


 


Monocytes (%) (Auto) 12 % (0-9)   12 % (0-9) 


 


Eosinophils (%) (Auto) 3 % (0-3)   5 % (0-3) 


 


Basophils (%) (Auto) 1 % (0-3)   1 % (0-3) 


 


Neutrophils # (Auto)


 4.6 x10^3uL


(1.8-7.7) 


 3.0 x10^3uL


(1.8-7.7)


 


Lymphocytes # (Auto)


 1.8 x10^3/uL


(1.0-4.8) 


 1.4 x10^3/uL


(1.0-4.8)


 


Monocytes # (Auto)


 0.9 x10^3/uL


(0.0-1.1) 


 0.6 x10^3/uL


(0.0-1.1)


 


Eosinophils # (Auto)


 0.2 x10^3/uL


(0.0-0.7) 


 0.2 x10^3/uL


(0.0-0.7)


 


Basophils # (Auto)


 0.1 x10^3/uL


(0.0-0.2) 


 0.1 x10^3/uL


(0.0-0.2)


 


Sodium Level


 136 mmol/L


(136-145) 


 139 mmol/L


(136-145)


 


Potassium Level


 4.5 mmol/L


(3.5-5.1) 


 3.9 mmol/L


(3.5-5.1)


 


Chloride Level


 104 mmol/L


() 


 105 mmol/L


()


 


Carbon Dioxide Level


 28 mmol/L


(21-32) 


 31 mmol/L


(21-32)


 


Anion Gap 4 (6-14)   3 (6-14) 


 


Blood Urea Nitrogen 9 mg/dL (7-20)   9 mg/dL (7-20) 


 


Creatinine


 0.9 mg/dL


(0.6-1.0) 


 0.9 mg/dL


(0.6-1.0)


 


Estimated GFR


(Cockcroft-Gault) 59.2 


 


 59.2 





 


BUN/Creatinine Ratio 10 (6-20)   10 (6-20) 


 


Glucose Level


 111 mg/dL


(70-99) 


 94 mg/dL


(70-99)


 


Calcium Level


 8.9 mg/dL


(8.5-10.1) 


 8.4 mg/dL


(8.5-10.1)


 


Total Bilirubin


 0.5 mg/dL


(0.2-1.0) 


 0.5 mg/dL


(0.2-1.0)


 


Aspartate Amino Transf


(AST/SGOT) 20 U/L (15-37) 


 


 27 U/L (15-37) 





 


Alanine Aminotransferase


(ALT/SGPT) 19 U/L (14-59) 


 


 25 U/L (14-59) 





 


Alkaline Phosphatase


 63 U/L


() 


 61 U/L


()


 


Troponin I Quantitative


 < 0.017 ng/mL


(0.000-0.055) 


 





 


Total Protein


 6.5 g/dL


(6.4-8.2) 


 5.9 g/dL


(6.4-8.2)


 


Albumin


 3.1 g/dL


(3.4-5.0) 


 2.7 g/dL


(3.4-5.0)


 


Albumin/Globulin Ratio 0.9 (1.0-1.7)   0.8 (1.0-1.7) 


 


Lipase


 86 U/L


() 


 





 


Urine Collection Type  Void  


 


Urine Color  Orange  


 


Urine Clarity  Clear  


 


Urine pH  7.0  


 


Urine Specific Gravity  1.020  


 


Urine Protein


 


 Negative mg/dL


(NEG-TRACE) 





 


Urine Glucose (UA)


 


 Negative mg/dL


(NEG) 





 


Urine Ketones (Stick)


 


 Negative mg/dL


(NEG) 





 


Urine Blood  Negative (NEG)  


 


Urine Nitrite  Positive (NEG)  


 


Urine Bilirubin  Negative (NEG)  


 


Urine Urobilinogen Dipstick


 


 1.0 mg/dL (0.2


mg/dL) 





 


Urine Leukocyte Esterase  Small (NEG)  


 


Urine RBC  0 /HPF (0-2)  


 


Urine WBC


 


 20-40 /HPF


(0-4) 





 


Urine Squamous Epithelial


Cells 


 Mod /LPF 


 





 


Urine Bacteria


 


 Mod /HPF


(0-FEW) 





 


Urine Hyaline Casts


 


 Occasional


/HPF 





 


Urine Mucus  Slight /LPF  


 


Urine Yeast  Present /HPF  








Medications





Current Medications


Fentanyl Citrate (Fentanyl 2ml Vial) 50 mcg 1X  ONCE IV ;  Start 9/10/18 at 16:

15;  Stop 9/10/18 at 16:24;  Status DC


Morphine Sulfate (Morphine Sulfate) 4 mg 1X  ONCE IV  Last administered on 9/10/

18at 17:28;  Start 9/10/18 at 16:30;  Stop 9/10/18 at 16:31;  Status DC


Ondansetron HCl (Zofran) 4 mg 1X  ONCE IV  Last administered on 9/10/18at 17:27

;  Start 9/10/18 at 16:30;  Stop 9/10/18 at 16:31;  Status DC


Ondansetron HCl (Zofran) 4 mg PRN Q8HRS  PRN IV NAUSEA/VOMITING;  Start 9/10/18 

at 18:45;  Stop 9/11/18 at 08:55;  Status DC


Morphine Sulfate (Morphine Sulfate) 2 mg PRN Q2HR  PRN IV PAIN Last 

administered on 9/11/18at 04:48;  Start 9/10/18 at 18:45;  Stop 9/11/18 at 18:44


Acetaminophen (Tylenol) 650 mg PRN Q4HRS  PRN PO FEVER;  Start 9/10/18 at 18:45

;  Stop 9/11/18 at 09:03;  Status DC


Ceftriaxone Sodium 1 gm/ Dextrose 50 ml @  100 mls/hr Q24H IV ;  Start 9/10/18 

at 23:30;  Status UNV


Ceftriaxone Sodium (Rocephin) 1 gm Q24H IVP  Last administered on 9/11/18at 00:

18;  Start 9/10/18 at 23:30


Aspirin (Ecotrin) 325 mg DAILY PO  Last administered on 9/11/18at 08:34;  Start 

9/11/18 at 09:00


Atorvastatin Calcium (Lipitor) 20 mg HS PO ;  Start 9/11/18 at 21:00


Carvedilol (Coreg) 3.125 mg BIDWMEALS PO  Last administered on 9/11/18at 08:35;

  Start 9/11/18 at 08:00


Levothyroxine Sodium (Synthroid) 75 mcg DAILY06 PO  Last administered on 9/11/ 18at 06:09;  Start 9/11/18 at 06:00


Oxycodone/ Acetaminophen (Percocet 5/325) 1 tab PRN TID  PRN PO PAIN;  Start 9/ 11/18 at 00:00


Potassium Chloride (Klor-Con) 10 meq BIDWMEALS PO  Last administered on 9/11/ 18at 08:34;  Start 9/11/18 at 08:00


Multivitamins/ Minerals (I-Elizabeth) 1 tab DAILY PO  Last administered on 9/11/18at 

08:34;  Start 9/11/18 at 09:00


Donepezil HCl (Aricept) 5 mg HS PO ;  Start 9/11/18 at 21:00


Hydralazine HCl (Apresoline) 10 mg TID PO  Last administered on 9/11/18at 08:38

;  Start 9/11/18 at 09:00


Phenazopyridine HCl (Pyridium) 100 mg PRN TID  PRN PO URINARY PAIN;  Start 9/11/ 18 at 00:00


Promethazine HCl (Phenergan) 25 mg PRN Q6HRS  PRN PO NAUSEA/VOMITING;  Start 9/ 11/18 at 00:00


Risperidone (RisperDAL) 0.5 mg BID PO  Last administered on 9/11/18at 08:34;  

Start 9/11/18 at 09:00


Venlafaxine HCl (Effexor) 50 mg BID PO  Last administered on 9/11/18at 08:34;  

Start 9/11/18 at 09:00


Ondansetron HCl (Zofran) 4 mg PRN Q6HRS  PRN IV NAUSEA/VOMITING;  Start 9/11/18 

at 09:00


Acetaminophen (Tylenol) 500 mg PRN Q6HRS  PRN PO MILD PAIN / TEMP;  Start 9/11/ 18 at 09:00


Lidocaine (Lidoderm) 1 patch DAILY  PRN TD Back Pain;  Start 9/11/18 at 09:00





Active Scripts


Active


Percocet 5-325 Mg Tablet (Oxycodone/Acetaminophen) 1 Each Tablet 1-2 Each PO 

PRN TID PRN


     pain


Percocet 5-325 Mg Tablet (Oxycodone/Acetaminophen) 1 Each Tablet 1-2 Each PO 

PRN TID PRN


     pain


Augmentin 875-125 Tablet (Amoxicillin/Potassium Clav) 1 Each Tablet 1 Tab PO BID


Pyridium (Phenazopyridine Hcl) 100 Mg Tablet 100 Mg PO TID


Reported


Donepezil Hcl 5 Mg Tablet 5 Mg PO HS


Venlafaxine Hcl 37.5 Mg Tablet 50 Mg PO BID


Carvedilol 3.125 Mg Tablet 3.125 Mg PO BID


Amlodipine Besylate 10 Mg Tablet 10 Mg PO DAILY


Levaquin (Levofloxacin) 500 Mg Tablet 1 Tab PO DAILY


Levothyroxine Sodium 75 Mcg Tablet 75 Mcg PO DAILY06


Phenazopyridine Hcl 100 Mg Tablet 100 Mg PO TID PRN


Potassium Chloride 10 Meq Tablet.er 10 Meq PO BID


Risperidone 0.5 Mg Tablet 0.5 Mg PO BID


Vision Vitamins (Beta-Carotene(A) W-C & E/Min) 1 Each Tablet 1 Tab PO DAILY


Hydralazine Hcl 10 Mg Tablet 10 Mg PO TID


Atorvastatin Calcium 20 Mg Tablet 20 Mg PO HS


Aspirin Ec (Aspirin) 325 Mg Tablet.dr 325 Mg PO DAILY


Promethazine Hcl 25 Mg Tablet 25 Mg PO PRN Q6HRS PRN


Vitals/I & O





Vital Sign - Last 24 Hours








 9/10/18 9/10/18 9/10/18 9/10/18





 16:14 17:14 17:28 18:14


 


Temp 98.3   





 98.3   


 


Pulse 81 78  78


 


Resp 16 16 16 18


 


B/P (MAP) 196/102 (133)   


 


Pulse Ox 89 94 94 91


 


O2 Delivery Room Air  Nasal Cannula 


 


O2 Flow Rate   3.0 


 


    





    





 9/10/18 9/10/18 9/10/18 9/10/18





 19:14 20:00 20:10 23:10


 


Temp   98.0 97.4





   98.0 97.4


 


Pulse 78 77 75 73


 


Resp 18 18 18 18


 


B/P (MAP)   156/75 (102) 147/64 (91)


 


Pulse Ox 92 93 92 96


 


O2 Delivery   Nasal Cannula Nasal Cannula


 


O2 Flow Rate   3.0 3.0


 


    





    





 9/10/18 9/11/18 9/11/18 9/11/18





 23:17 03:10 04:48 05:19


 


Temp  98.0  





  98.0  


 


Pulse  86  


 


Resp  18  


 


B/P (MAP)  157/87 (110)  


 


Pulse Ox  94  


 


O2 Delivery Nasal Cannula Nasal Cannula Nasal Cannula Nasal Cannula


 


O2 Flow Rate 3.0 3.0 3.0 3.0


 


    





    





 9/11/18 9/11/18 9/11/18 9/11/18





 07:00 07:53 08:35 08:38


 


Temp 97.5   





 97.5   


 


Pulse 85  85 85


 


Resp 20   


 


B/P (MAP) 172/95 (120)  172/95 172/95


 


Pulse Ox 93   


 


O2 Delivery Nasal Cannula Nasal Cannula  


 


O2 Flow Rate 3.0 3.0  














Intake and Output   


 


 9/10/18 9/10/18 9/11/18





 15:00 23:00 07:00


 


Intake Total   0 ml


 


Balance   0 ml

















JOHANNA ALONSO MD Sep 11, 2018 11:19

## 2018-09-11 NOTE — RAD
Lumbar spine, 3 views, 9/11/2018:

 

HISTORY: Low back pain

 

There is a mild thoracolumbar scoliosis. The lumbar vertebral heights are 

well-maintained. There is partial sacralization of L5. A laminectomy 

defect is seen at L5-S1. There are vertebroplasty changes at T12 and T9. 

There is mild to moderate disc space narrowing at multiple levels, 

particularly at L4-5 and L5-S1. There are moderate scattered marginal 

spurs. There are moderate degenerative changes involving the facet joints,

particularly in the lower lumbar spine. There is mild associated 

anterolisthesis at L4-5. No acute fractures evident. Moderate aortic 

calcific plaquing is noted.

 

IMPRESSION:

1. Moderate multilevel degenerative change.

2. Previous laminectomy at L5-S1.

3. Mild anterolisthesis at L4-5 due to facet joint arthropathy.

4. Vertebroplasty changes at T12 and T9.

 

Electronically signed by: Rick Moritz, MD (9/11/2018 2:41 PM) [facility]

## 2018-09-11 NOTE — EKG
[facility]

              [address] [place] [address]

Test Date:    2018-09-10               Test Time:    16:30:43

Pat Name:     KEARA FOX         Department:   

Patient ID:   [id number]           Room:         [facility]

Gender:       F                        Technician:   

:          [id number]               Requested By: EVANGELINA RODRIGUEZ

Order Number: [id number]           Reading MD:   Gino Mccauley

                                 Measurements

Intervals                              Axis          

Rate:         75                       P:            -12

TX:           174                      QRS:          -75

QRSD:         170                      T:            90

QT:           434                                    

QTc:          488                                    

                           Interpretive Statements

VENTRICULAR PACED RHYTHM

Electronically Signed On 2018 16:28:50 CDT by Gino Mccauley

## 2018-09-12 VITALS — SYSTOLIC BLOOD PRESSURE: 147 MMHG | DIASTOLIC BLOOD PRESSURE: 67 MMHG

## 2018-09-12 VITALS — SYSTOLIC BLOOD PRESSURE: 157 MMHG | DIASTOLIC BLOOD PRESSURE: 76 MMHG

## 2018-09-12 VITALS — SYSTOLIC BLOOD PRESSURE: 130 MMHG | DIASTOLIC BLOOD PRESSURE: 64 MMHG

## 2018-09-12 RX ADMIN — POTASSIUM CHLORIDE SCH MEQ: 750 TABLET, FILM COATED, EXTENDED RELEASE ORAL at 08:31

## 2018-09-12 RX ADMIN — CEFTRIAXONE SCH GM: 1 INJECTION, POWDER, FOR SOLUTION INTRAMUSCULAR; INTRAVENOUS at 00:02

## 2018-09-12 RX ADMIN — OXYCODONE HYDROCHLORIDE AND ACETAMINOPHEN PRN TAB: 5; 325 TABLET ORAL at 08:31

## 2018-09-12 RX ADMIN — ASPIRIN SCH MG: 325 TABLET, DELAYED RELEASE ORAL at 08:30

## 2018-09-12 RX ADMIN — OXYCODONE HYDROCHLORIDE AND ACETAMINOPHEN PRN TAB: 5; 325 TABLET ORAL at 00:02

## 2018-09-12 RX ADMIN — RISPERIDONE SCH MG: 0.25 TABLET ORAL at 08:30

## 2018-09-12 RX ADMIN — VENLAFAXINE SCH MG: 50 TABLET ORAL at 08:30

## 2018-09-12 RX ADMIN — ACETAMINOPHEN PRN MG: 500 TABLET ORAL at 05:01

## 2018-09-12 RX ADMIN — LEVOTHYROXINE SODIUM SCH MCG: 75 TABLET ORAL at 05:01

## 2018-09-12 RX ADMIN — I-VITE, TAB 1000-60-2MG (60/BT) SCH TAB: TAB at 08:30

## 2018-09-12 RX ADMIN — CARVEDILOL SCH MG: 3.12 TABLET, FILM COATED ORAL at 08:30

## 2018-09-12 NOTE — PDOC
PROGRESS NOTES


Subjective


Subjective


No new complaints.





Objective


Objective





Vital Signs








  Date Time  Temp Pulse Resp B/P (MAP) Pulse Ox O2 Delivery O2 Flow Rate FiO2


 


9/12/18 08:31  74  157/76    


 


9/12/18 08:31      Nasal Cannula 3.0 


 


9/12/18 07:00 96.4  20  97   





 96.4       














Intake and Output 


 


 9/12/18





 07:00


 


Intake Total 250 ml


 


Balance 250 ml


 


 


 


Intake Oral 250 ml


 


# Voids 5











Physical Exam


Physical Exam


She denies any back pain now and  she is lying on her right side and seems to 

be comfortable and she got up and walked with roller walker and abdominal 

binder and she had tenderness to palpation over lumbar paraspinal muscles and 

sacroiliac joints and painfully limited lumbar spine ROM and x-rays revealed 

DDD and DJD of lumbar vertebrae.





Assessment


Assessment


Problems


Medical Problems:


(1) Back pain


Status: Acute  











Plan


Plan of Care


Home when medically stable.





Comment


Review of Relevant


I have reviewed the following items fady (where applicable) has been applied.


Labs





Laboratory Tests








Test


 9/10/18


17:25 9/10/18


17:55 9/11/18


05:25


 


White Blood Count


 7.5 x10^3/uL


(4.0-11.0) 


 5.3 x10^3/uL


(4.0-11.0)


 


Red Blood Count


 4.77 x10^6/uL


(3.50-5.40) 


 4.39 x10^6/uL


(3.50-5.40)


 


Hemoglobin


 14.1 g/dL


(12.0-15.5) 


 13.3 g/dL


(12.0-15.5)


 


Hematocrit


 42.9 %


(36.0-47.0) 


 39.7 %


(36.0-47.0)


 


Mean Corpuscular Volume 90 fL ()   91 fL () 


 


Mean Corpuscular Hemoglobin 30 pg (25-35)   30 pg (25-35) 


 


Mean Corpuscular Hemoglobin


Concent 33 g/dL


(31-37) 


 34 g/dL


(31-37)


 


Red Cell Distribution Width


 14.3 %


(11.5-14.5) 


 14.5 %


(11.5-14.5)


 


Platelet Count


 221 x10^3/uL


(140-400) 


 199 x10^3/uL


(140-400)


 


Neutrophils (%) (Auto) 61 % (31-73)   57 % (31-73) 


 


Lymphocytes (%) (Auto) 24 % (24-48)   26 % (24-48) 


 


Monocytes (%) (Auto) 12 % (0-9)   12 % (0-9) 


 


Eosinophils (%) (Auto) 3 % (0-3)   5 % (0-3) 


 


Basophils (%) (Auto) 1 % (0-3)   1 % (0-3) 


 


Neutrophils # (Auto)


 4.6 x10^3uL


(1.8-7.7) 


 3.0 x10^3uL


(1.8-7.7)


 


Lymphocytes # (Auto)


 1.8 x10^3/uL


(1.0-4.8) 


 1.4 x10^3/uL


(1.0-4.8)


 


Monocytes # (Auto)


 0.9 x10^3/uL


(0.0-1.1) 


 0.6 x10^3/uL


(0.0-1.1)


 


Eosinophils # (Auto)


 0.2 x10^3/uL


(0.0-0.7) 


 0.2 x10^3/uL


(0.0-0.7)


 


Basophils # (Auto)


 0.1 x10^3/uL


(0.0-0.2) 


 0.1 x10^3/uL


(0.0-0.2)


 


Sodium Level


 136 mmol/L


(136-145) 


 139 mmol/L


(136-145)


 


Potassium Level


 4.5 mmol/L


(3.5-5.1) 


 3.9 mmol/L


(3.5-5.1)


 


Chloride Level


 104 mmol/L


() 


 105 mmol/L


()


 


Carbon Dioxide Level


 28 mmol/L


(21-32) 


 31 mmol/L


(21-32)


 


Anion Gap 4 (6-14)   3 (6-14) 


 


Blood Urea Nitrogen 9 mg/dL (7-20)   9 mg/dL (7-20) 


 


Creatinine


 0.9 mg/dL


(0.6-1.0) 


 0.9 mg/dL


(0.6-1.0)


 


Estimated GFR


(Cockcroft-Gault) 59.2 


 


 59.2 





 


BUN/Creatinine Ratio 10 (6-20)   10 (6-20) 


 


Glucose Level


 111 mg/dL


(70-99) 


 94 mg/dL


(70-99)


 


Calcium Level


 8.9 mg/dL


(8.5-10.1) 


 8.4 mg/dL


(8.5-10.1)


 


Total Bilirubin


 0.5 mg/dL


(0.2-1.0) 


 0.5 mg/dL


(0.2-1.0)


 


Aspartate Amino Transf


(AST/SGOT) 20 U/L (15-37) 


 


 27 U/L (15-37) 





 


Alanine Aminotransferase


(ALT/SGPT) 19 U/L (14-59) 


 


 25 U/L (14-59) 





 


Alkaline Phosphatase


 63 U/L


() 


 61 U/L


()


 


Troponin I Quantitative


 < 0.017 ng/mL


(0.000-0.055) 


 





 


Total Protein


 6.5 g/dL


(6.4-8.2) 


 5.9 g/dL


(6.4-8.2)


 


Albumin


 3.1 g/dL


(3.4-5.0) 


 2.7 g/dL


(3.4-5.0)


 


Albumin/Globulin Ratio 0.9 (1.0-1.7)   0.8 (1.0-1.7) 


 


Lipase


 86 U/L


() 


 





 


Urine Collection Type  Void  


 


Urine Color  Orange  


 


Urine Clarity  Clear  


 


Urine pH  7.0  


 


Urine Specific Gravity  1.020  


 


Urine Protein


 


 Negative mg/dL


(NEG-TRACE) 





 


Urine Glucose (UA)


 


 Negative mg/dL


(NEG) 





 


Urine Ketones (Stick)


 


 Negative mg/dL


(NEG) 





 


Urine Blood  Negative (NEG)  


 


Urine Nitrite  Positive (NEG)  


 


Urine Bilirubin  Negative (NEG)  


 


Urine Urobilinogen Dipstick


 


 1.0 mg/dL (0.2


mg/dL) 





 


Urine Leukocyte Esterase  Small (NEG)  


 


Urine RBC  0 /HPF (0-2)  


 


Urine WBC


 


 20-40 /HPF


(0-4) 





 


Urine Squamous Epithelial


Cells 


 Mod /LPF 


 





 


Urine Bacteria


 


 Mod /HPF


(0-FEW) 





 


Urine Hyaline Casts


 


 Occasional


/HPF 





 


Urine Mucus  Slight /LPF  


 


Urine Yeast  Present /HPF  








Medications





Current Medications


Fentanyl Citrate (Fentanyl 2ml Vial) 50 mcg 1X  ONCE IV ;  Start 9/10/18 at 16:

15;  Stop 9/10/18 at 16:24;  Status DC


Morphine Sulfate (Morphine Sulfate) 4 mg 1X  ONCE IV  Last administered on 9/10/

18at 17:28;  Start 9/10/18 at 16:30;  Stop 9/10/18 at 16:31;  Status DC


Ondansetron HCl (Zofran) 4 mg 1X  ONCE IV  Last administered on 9/10/18at 17:27

;  Start 9/10/18 at 16:30;  Stop 9/10/18 at 16:31;  Status DC


Ondansetron HCl (Zofran) 4 mg PRN Q8HRS  PRN IV NAUSEA/VOMITING;  Start 9/10/18 

at 18:45;  Stop 9/11/18 at 08:55;  Status DC


Morphine Sulfate (Morphine Sulfate) 2 mg PRN Q2HR  PRN IV PAIN Last 

administered on 9/11/18at 04:48;  Start 9/10/18 at 18:45;  Stop 9/11/18 at 18:44

;  Status DC


Acetaminophen (Tylenol) 650 mg PRN Q4HRS  PRN PO FEVER;  Start 9/10/18 at 18:45

;  Stop 9/11/18 at 09:03;  Status DC


Ceftriaxone Sodium 1 gm/ Dextrose 50 ml @  100 mls/hr Q24H IV ;  Start 9/10/18 

at 23:30;  Status UNV


Ceftriaxone Sodium (Rocephin) 1 gm Q24H IVP  Last administered on 9/12/18at 00:

02;  Start 9/10/18 at 23:30


Aspirin (Ecotrin) 325 mg DAILY PO  Last administered on 9/12/18at 08:30;  Start 

9/11/18 at 09:00


Atorvastatin Calcium (Lipitor) 20 mg HS PO  Last administered on 9/11/18at 20:06

;  Start 9/11/18 at 21:00


Carvedilol (Coreg) 3.125 mg BIDWMEALS PO  Last administered on 9/12/18at 08:30;

  Start 9/11/18 at 08:00


Levothyroxine Sodium (Synthroid) 75 mcg DAILY06 PO  Last administered on 9/12/ 18at 05:01;  Start 9/11/18 at 06:00


Oxycodone/ Acetaminophen (Percocet 5/325) 1 tab PRN TID  PRN PO PAIN Last 

administered on 9/12/18at 08:31;  Start 9/11/18 at 00:00


Potassium Chloride (Klor-Con) 10 meq BIDWMEALS PO  Last administered on 9/12/ 18at 08:31;  Start 9/11/18 at 08:00


Multivitamins/ Minerals (I-Elizabeth) 1 tab DAILY PO  Last administered on 9/12/18at 

08:30;  Start 9/11/18 at 09:00


Donepezil HCl (Aricept) 5 mg HS PO  Last administered on 9/11/18at 20:05;  

Start 9/11/18 at 21:00


Hydralazine HCl (Apresoline) 10 mg TID PO  Last administered on 9/12/18at 08:31

;  Start 9/11/18 at 09:00


Phenazopyridine HCl (Pyridium) 100 mg PRN TID  PRN PO URINARY PAIN;  Start 9/11/ 18 at 00:00


Promethazine HCl (Phenergan) 25 mg PRN Q6HRS  PRN PO NAUSEA/VOMITING;  Start 9/ 11/18 at 00:00


Risperidone (RisperDAL) 0.5 mg BID PO  Last administered on 9/12/18at 08:30;  

Start 9/11/18 at 09:00


Venlafaxine HCl (Effexor) 50 mg BID PO  Last administered on 9/12/18at 08:30;  

Start 9/11/18 at 09:00


Ondansetron HCl (Zofran) 4 mg PRN Q6HRS  PRN IV NAUSEA/VOMITING;  Start 9/11/18 

at 09:00


Acetaminophen (Tylenol) 500 mg PRN Q6HRS  PRN PO MILD PAIN / TEMP Last 

administered on 9/12/18at 05:01;  Start 9/11/18 at 09:00


Lidocaine (Lidoderm) 1 patch DAILY  PRN TD Back Pain;  Start 9/11/18 at 09:00;  

Stop 9/11/18 at 11:16;  Status DC


Morphine Sulfate (Morphine Sulfate) 2 mg PRN Q2HR  PRN IV PAIN Last 

administered on 9/12/18at 05:01;  Start 9/11/18 at 21:15





Active Scripts


Active


Percocet 5-325 Mg Tablet (Oxycodone/Acetaminophen) 1 Each Tablet 1-2 Each PO 

PRN TID PRN


     pain


Percocet 5-325 Mg Tablet (Oxycodone/Acetaminophen) 1 Each Tablet 1-2 Each PO 

PRN TID PRN


     pain


Augmentin 875-125 Tablet (Amoxicillin/Potassium Clav) 1 Each Tablet 1 Tab PO BID


Pyridium (Phenazopyridine Hcl) 100 Mg Tablet 100 Mg PO TID


Reported


Donepezil Hcl 5 Mg Tablet 5 Mg PO HS


Venlafaxine Hcl 37.5 Mg Tablet 50 Mg PO BID


Carvedilol 3.125 Mg Tablet 3.125 Mg PO BID


Amlodipine Besylate 10 Mg Tablet 10 Mg PO DAILY


Levaquin (Levofloxacin) 500 Mg Tablet 1 Tab PO DAILY


Levothyroxine Sodium 75 Mcg Tablet 75 Mcg PO DAILY06


Phenazopyridine Hcl 100 Mg Tablet 100 Mg PO TID PRN


Potassium Chloride 10 Meq Tablet.er 10 Meq PO BID


Risperidone 0.5 Mg Tablet 0.5 Mg PO BID


Vision Vitamins (Beta-Carotene(A) W-C & E/Min) 1 Each Tablet 1 Tab PO DAILY


Hydralazine Hcl 10 Mg Tablet 10 Mg PO TID


Atorvastatin Calcium 20 Mg Tablet 20 Mg PO HS


Aspirin Ec (Aspirin) 325 Mg Tablet.dr 325 Mg PO DAILY


Promethazine Hcl 25 Mg Tablet 25 Mg PO PRN Q6HRS PRN


Vitals/I & O





Vital Sign - Last 24 Hours








 9/11/18 9/11/18 9/11/18 9/11/18





 11:00 12:31 15:00 16:59


 


Temp 97.7  97.7 





 97.7  97.7 


 


Pulse 75 75 83 83


 


Resp 20  20 


 


B/P (MAP) 156/74 (101) 156/74 168/83 (111) 168/83


 


Pulse Ox 91  96 


 


O2 Delivery Nasal Cannula  Nasal Cannula 


 


O2 Flow Rate 3.0  3.0 


 


    





    





 9/11/18 9/11/18 9/11/18 9/11/18





 16:59 19:15 20:00 20:06


 


Temp  97.7  





  97.7  


 


Pulse  72  72


 


Resp  20  


 


B/P (MAP)  158/55 (89)  158/55


 


Pulse Ox  93  


 


O2 Delivery Nasal Cannula Nasal Cannula Nasal Cannula 


 


O2 Flow Rate 3.0 3.0 3.0 


 


    





    





 9/11/18 9/12/18 9/12/18 9/12/18





 23:15 00:02 01:02 03:15


 


Temp 98.1   97.9





 98.1   97.9


 


Pulse 79   76


 


Resp 20   20


 


B/P (MAP) 166/66 (99)   147/67 (93)


 


Pulse Ox 94  94 95


 


O2 Delivery Nasal Cannula Nasal Cannula Nasal Cannula Nasal Cannula


 


O2 Flow Rate 3.0 3.0 3.0 3.0


 


    





    





 9/12/18 9/12/18 9/12/18 9/12/18





 05:01 05:31 07:00 07:32


 


Temp   96.4 





   96.4 


 


Pulse   74 


 


Resp   20 


 


B/P (MAP)   157/76 (103) 


 


Pulse Ox  95 97 


 


O2 Delivery Nasal Cannula Nasal Cannula Nasal Cannula Nasal Cannula


 


O2 Flow Rate 3.0 3.0 3.0 3.0


 


    





    





 9/12/18 9/12/18 9/12/18 





 08:30 08:31 08:31 


 


Pulse 74  74 


 


B/P (MAP) 157/76  157/76 


 


O2 Delivery  Nasal Cannula  


 


O2 Flow Rate  3.0  














Intake and Output   


 


 9/11/18 9/11/18 9/12/18





 15:00 23:00 07:00


 


Intake Total  50 ml 200 ml


 


Balance  50 ml 200 ml

















PASCALE PALMER MD Sep 12, 2018 09:18

## 2018-09-12 NOTE — RAD
Renal ultrasound, 9/12/2018:

 

HISTORY: Follow-up renal cyst

 

The right kidney measures 10.8 cm in length while the left kidney measures

12.8 cm. There is no evidence of hydronephrosis. There is mild bilateral 

renal cortical scarring. A small hypoechoic structures again noted in the 

upper pole of the left kidney currently measures 1.8 cm in greatest 

diameter. There is some echogenic irregularity of its wall. Multiphase CT 

scanning would best demonstrate this structure, if clinically indicated. 

Allowing for technical differences it appears unchanged since 9/4/2018. No

other renal mass is evident.

 

The bladder is collapsed and not adequately visualized.

 

IMPRESSION:

1. Small complicated left renal cyst.

2. No significant change since 9/4/2018

 

Electronically signed by: Rick Moritz, MD (9/12/2018 12:49 PM) [facility]

## 2018-09-12 NOTE — DISCH
DISCHARGE WITH HOME HEALTH


DISCHARGE INFORMATION:


Final Diagnosis:


Problems


Medical Problems:


(1) Back pain


Status: Acute  








Condition on Discharge:  Stable





CODE STATUS:


Code Status:  Full





HOME HEALTH:


Face to Face:


I certify this patient is under my care and that I, or a nurse practitioner or 

physician's assistant working with me, had a face to face encounter that meets 

the physician face to face encounter requirements with this patient on [].


Physical Therapy For:  Evalulation/Treatment


Occupational Therapy For:  Evaluation/Treatment


Home Health Aide For:  Self-care





POST DISCHARGE ORDERS:


Activity Instructions for Disc:  Activity as tolerated


Weight Bearing Status after Di:  As tolerated


DIET AFTER DISCHARGE:  Regular





CHECKS AFTER DISCHARGE:


Checks after discharge:  Check blood press - daily





TREATMENT/EQUIPMENT ORDERS:


Adaptive Equipment Issued:  None





CERTIFICATION STATEMENT:


Certification Statement:


Certification Statement: Based on the above finding, I certify that this 

patient is confined to the home and needs intermittent skilled nursing care, 

physical therapy and/or speech therapy, or continues to need occupational 

therapy.~ This patient is under my care, and I have initiated the establishment 

of the plan of care.~ This patient will be followed by myself or a community 

physician who will periodically review the plan of care.


Home Meds


Active Scripts


Nitrofurantoin Monohyd/M-Cryst (NITROFURANTOIN MONO- MG) 100 Mg Capsule, 

1 CAP PO BID, #14 CAP


   Prov:JOHANNA ALONSO MD         9/12/18


Oxycodone/Apap 5-325 (PERCOCET 5-325 MG TABLET) 1 Each Tablet, 1-2 EACH PO PRN 

TID PRN for severe pain, #30 TAB


   pain


   Prov:JENNIFER STEEN DO         9/2/18


Oxycodone/Apap 5-325 (PERCOCET 5-325 MG TABLET) 1 Each Tablet, 1-2 EACH PO PRN 

TID PRN for PAIN, #25 TAB


   pain


   Prov:KELL CRUZ MD         8/26/18


Amoxicillin/Potassium Clav (AUGMENTIN 875-125 TABLET) 1 Each Tablet, 1 TAB PO 

BID, #20 TAB


   Prov:KELL CRUZ MD         8/26/18


Phenazopyridine Hcl (PYRIDIUM) 100 Mg Tablet, 100 MG PO TID, #20 TAB


   Prov:ELIZABET TABARES MD         8/23/18


Reported Medications


Donepezil Hcl (DONEPEZIL HCL) 5 Mg Tablet, 5 MG PO HS, TAB


   8/23/18


Venlafaxine Hcl (VENLAFAXINE HCL) 37.5 Mg Tablet, 50 MG PO BID, TAB


   8/23/18


Carvedilol (CARVEDILOL) 3.125 Mg Tablet, 3.125 MG PO BID, TAB


   8/23/18


Amlodipine Besylate (AMLODIPINE BESYLATE) 10 Mg Tablet, 10 MG PO DAILY, TAB


   8/23/18


Levofloxacin (LEVAQUIN) 500 Mg Tablet, 1 TAB PO DAILY, #7 TAB


   8/23/18


Levothyroxine Sodium (LEVOTHYROXINE SODIUM) 75 Mcg Tablet, 75 MCG PO DAILY06, 

TAB


   8/23/18


Phenazopyridine Hcl (PHENAZOPYRIDINE HCL) 100 Mg Tablet, 100 MG PO TID PRN for 

URINARY PAIN, TAB


   8/23/18


Potassium Chloride (POTASSIUM CHLORIDE) 10 Meq Tablet.er, 10 MEQ PO BID, TAB


   8/23/18


Risperidone (RISPERIDONE) 0.5 Mg Tablet, 0.5 MG PO BID, TAB


   8/23/18


Beta-Carotene(A) W-C & E/Min (VISION VITAMINS) 1 Each Tablet, 1 TAB PO DAILY, 

TAB


   8/23/18


Hydralazine Hcl (HYDRALAZINE HCL) 10 Mg Tablet, 10 MG PO TID, TAB


   8/23/18


Atorvastatin Calcium (ATORVASTATIN CALCIUM) 20 Mg Tablet, 20 MG PO HS for FOR 

CHOLESTEROL, TAB 0 Refills


   8/23/18


Aspirin (ASPIRIN EC) 325 Mg Tablet.dr, 325 MG PO DAILY, TAB


   8/23/18


Promethazine Hcl (PROMETHAZINE HCL) 25 Mg Tablet, 25 MG PO PRN Q6HRS PRN for 

NAUSEA/VOMITING, TAB


   8/23/18











JOHANNA ALONSO MD Sep 12, 2018 12:40

## 2018-09-12 NOTE — PDOC3
Discharge Summary


Visit Information


Date of Admission:  Sep 10, 2018


Date of Discharge:  Sep 12, 2018


Admitting Diagnosis Comment:


BACK PAIN


1. Moderate multilevel degenerative change.


2. Previous laminectomy at L5-S1.


3. Mild anterolisthesis at L4-5 due to facet joint arthropathy.


4. Vertebroplasty changes at T12 and T9.


RENAL CYST?


Final Diagnosis


Problems


Medical Problems:


(1) Back pain


Status: Acute  











Brief Hospital Course


Allergies





 Allergies








Coded Allergies Type Severity Reaction Last Updated Verified


 


  Sulfa (Sulfonamide Antibiotics) Allergy Intermediate Unknown 8/23/18 Yes


 


  adhesive tape Allergy Intermediate Hives 8/23/18 Yes


 


  erythromycin base Allergy Intermediate Swelling. 8/23/18 Yes


 


  iodine Allergy Intermediate Swelling. 8/23/18 Yes


 


  meperidine Allergy Intermediate Itch,N/V,Swelling 8/23/18 Yes


 


  codeine Adverse Reaction Intermediate "Builds up-makes me crazy" 9/2/18 Yes


 


  hydrocodone Adverse Reaction Intermediate "Builds up-makes me crazy." 9/2/18 

Yes








Vital Signs





Vital Signs








  Date Time  Temp Pulse Resp B/P (MAP) Pulse Ox O2 Delivery O2 Flow Rate FiO2


 


9/12/18 11:00 96.1 72 20 130/64 (86) 95 Nasal Cannula 3.0 





 96.1       








Lab Results





Laboratory Tests








Test


 9/10/18


17:25 9/10/18


17:55 9/11/18


05:25


 


White Blood Count


 7.5 x10^3/uL


(4.0-11.0) 


 5.3 x10^3/uL


(4.0-11.0)


 


Red Blood Count


 4.77 x10^6/uL


(3.50-5.40) 


 4.39 x10^6/uL


(3.50-5.40)


 


Hemoglobin


 14.1 g/dL


(12.0-15.5) 


 13.3 g/dL


(12.0-15.5)


 


Hematocrit


 42.9 %


(36.0-47.0) 


 39.7 %


(36.0-47.0)


 


Mean Corpuscular Volume 90 fL ()   91 fL () 


 


Mean Corpuscular Hemoglobin 30 pg (25-35)   30 pg (25-35) 


 


Mean Corpuscular Hemoglobin


Concent 33 g/dL


(31-37) 


 34 g/dL


(31-37)


 


Red Cell Distribution Width


 14.3 %


(11.5-14.5) 


 14.5 %


(11.5-14.5)


 


Platelet Count


 221 x10^3/uL


(140-400) 


 199 x10^3/uL


(140-400)


 


Neutrophils (%) (Auto) 61 % (31-73)   57 % (31-73) 


 


Lymphocytes (%) (Auto) 24 % (24-48)   26 % (24-48) 


 


Monocytes (%) (Auto) 12 % (0-9)   12 % (0-9) 


 


Eosinophils (%) (Auto) 3 % (0-3)   5 % (0-3) 


 


Basophils (%) (Auto) 1 % (0-3)   1 % (0-3) 


 


Neutrophils # (Auto)


 4.6 x10^3uL


(1.8-7.7) 


 3.0 x10^3uL


(1.8-7.7)


 


Lymphocytes # (Auto)


 1.8 x10^3/uL


(1.0-4.8) 


 1.4 x10^3/uL


(1.0-4.8)


 


Monocytes # (Auto)


 0.9 x10^3/uL


(0.0-1.1) 


 0.6 x10^3/uL


(0.0-1.1)


 


Eosinophils # (Auto)


 0.2 x10^3/uL


(0.0-0.7) 


 0.2 x10^3/uL


(0.0-0.7)


 


Basophils # (Auto)


 0.1 x10^3/uL


(0.0-0.2) 


 0.1 x10^3/uL


(0.0-0.2)


 


Sodium Level


 136 mmol/L


(136-145) 


 139 mmol/L


(136-145)


 


Potassium Level


 4.5 mmol/L


(3.5-5.1) 


 3.9 mmol/L


(3.5-5.1)


 


Chloride Level


 104 mmol/L


() 


 105 mmol/L


()


 


Carbon Dioxide Level


 28 mmol/L


(21-32) 


 31 mmol/L


(21-32)


 


Anion Gap 4 (6-14)   3 (6-14) 


 


Blood Urea Nitrogen 9 mg/dL (7-20)   9 mg/dL (7-20) 


 


Creatinine


 0.9 mg/dL


(0.6-1.0) 


 0.9 mg/dL


(0.6-1.0)


 


Estimated GFR


(Cockcroft-Gault) 59.2 


 


 59.2 





 


BUN/Creatinine Ratio 10 (6-20)   10 (6-20) 


 


Glucose Level


 111 mg/dL


(70-99) 


 94 mg/dL


(70-99)


 


Calcium Level


 8.9 mg/dL


(8.5-10.1) 


 8.4 mg/dL


(8.5-10.1)


 


Total Bilirubin


 0.5 mg/dL


(0.2-1.0) 


 0.5 mg/dL


(0.2-1.0)


 


Aspartate Amino Transf


(AST/SGOT) 20 U/L (15-37) 


 


 27 U/L (15-37) 





 


Alanine Aminotransferase


(ALT/SGPT) 19 U/L (14-59) 


 


 25 U/L (14-59) 





 


Alkaline Phosphatase


 63 U/L


() 


 61 U/L


()


 


Troponin I Quantitative


 < 0.017 ng/mL


(0.000-0.055) 


 





 


Total Protein


 6.5 g/dL


(6.4-8.2) 


 5.9 g/dL


(6.4-8.2)


 


Albumin


 3.1 g/dL


(3.4-5.0) 


 2.7 g/dL


(3.4-5.0)


 


Albumin/Globulin Ratio 0.9 (1.0-1.7)   0.8 (1.0-1.7) 


 


Lipase


 86 U/L


() 


 





 


Urine Collection Type  Void  


 


Urine Color  Orange  


 


Urine Clarity  Clear  


 


Urine pH  7.0  


 


Urine Specific Gravity  1.020  


 


Urine Protein


 


 Negative mg/dL


(NEG-TRACE) 





 


Urine Glucose (UA)


 


 Negative mg/dL


(NEG) 





 


Urine Ketones (Stick)


 


 Negative mg/dL


(NEG) 





 


Urine Blood  Negative (NEG)  


 


Urine Nitrite  Positive (NEG)  


 


Urine Bilirubin  Negative (NEG)  


 


Urine Urobilinogen Dipstick


 


 1.0 mg/dL (0.2


mg/dL) 





 


Urine Leukocyte Esterase  Small (NEG)  


 


Urine RBC  0 /HPF (0-2)  


 


Urine WBC


 


 20-40 /HPF


(0-4) 





 


Urine Squamous Epithelial


Cells 


 Mod /LPF 


 





 


Urine Bacteria


 


 Mod /HPF


(0-FEW) 





 


Urine Hyaline Casts


 


 Occasional


/HPF 





 


Urine Mucus  Slight /LPF  


 


Urine Yeast  Present /HPF  








Brief Hospital Course


Ms. Chinchilla  is a 87 old AA female admitted for back pain, SHe has hx acute on 

chronic back pain. FAm was requesting mRI but cant be done bec of indwelling 

pacer,  We did x-rays which showed previous laminectomy L5-S1 DJD, joint facet 

arthropathy-please refer to my above assessment plan


Provide some pain medicines, Lidoderm patch. No PT needs at least for arranged 

for home health. Family request a renal ultrasound or MRI because of history of 

renal cyst. Ultrasound kidneys are pending, there is no AK I. If that is all 

benign and will go home today with home health


Pain medicines on chart





COnsults: physiatry





Proc; imaging


Dispo: 





Discharge Information


Condition at Discharge:  Improved, Stable


Disposition/Orders:  D/C to Home w/ HH


Scheduled


Amlodipine Besylate (Amlodipine Besylate) 10 Mg Tablet, 10 MG PO DAILY, (

Reported)


   Entered as Reported by: GERRI MONROE on 8/23/18 0853


Amoxicillin/Potassium Clav (Augmentin 875-125 Tablet) 1 Each Tablet, 1 TAB PO 

BID, #20


   Prescribed by: KELL CRUZ MD on 8/26/18 1037


Aspirin (Aspirin Ec) 325 Mg Tablet.dr, 325 MG PO DAILY, (Reported)


   Entered as Reported by: GERRI MONROE on 8/23/18 0853


   Last Action: Continued on 9/10/18 2358 by CLAUDIA KAMINSKI


Atorvastatin Calcium (Atorvastatin Calcium) 20 Mg Tablet, 20 MG PO HS for FOR 

CHOLESTEROL, Ref 0 (Reported)


   Entered as Reported by: GERRI MONROE on 8/23/18 0853


   Last Action: Continued on 9/10/18 2358 by CLAUDIA KAMINSKI


Beta-Carotene(A) W-C & E/Min (Vision Vitamins) 1 Each Tablet, 1 TAB PO DAILY, (

Reported)


   Entered as Reported by: GERRI MONROE on 8/23/18 0853


   Last Action: Converted on 9/10/18 2358 by CLAUDIA KAMINSKI


Carvedilol (Carvedilol) 3.125 Mg Tablet, 3.125 MG PO BID, (Reported)


   Entered as Reported by: GERRI MONROE on 8/23/18 0853


   Last Action: Continued on 9/10/18 2358 by CLAUDIA KAMINSKI


Donepezil Hcl (Donepezil Hcl) 5 Mg Tablet, 5 MG PO HS, (Reported)


   Entered as Reported by: GERRI MONROE on 8/23/18 0853


   Last Action: Converted on 9/10/18 2358 by CLAUDIA KAMINSKI


Hydralazine Hcl (Hydralazine Hcl) 10 Mg Tablet, 10 MG PO TID, (Reported)


   Entered as Reported by: GERRI MONROE on 8/23/18 0853


   Last Action: Converted on 9/10/18 2358 by CLAUDIA KAMINSKI


Levofloxacin (Levaquin) 500 Mg Tablet, 1 TAB PO DAILY, #7 (Reported)


   Entered as Reported by: GERRI MONROE on 8/23/18 0853


   Last Action: Reviewed on 9/10/18 2315 by CLAUDIA KAMINSKI


Levothyroxine Sodium (Levothyroxine Sodium) 75 Mcg Tablet, 75 MCG PO DAILY06, (

Reported)


   Entered as Reported by: GERRI MONROE on 8/23/18 0853


   Last Action: Continued on 9/10/18 2358 by CLAUDIA KAMINSKI


Nitrofurantoin Monohyd/M-Cryst (Nitrofurantoin Mono-Mcr 100 Mg) 100 Mg Capsule, 

1 CAP PO BID, #14


   Prescribed by: JOHANNA ALONSO on 9/12/18 0914


Phenazopyridine Hcl (Pyridium) 100 Mg Tablet, 100 MG PO TID, #20


   Prescribed by: ELIZABET TABARES MD on 8/23/18 0927


Potassium Chloride (Potassium Chloride) 10 Meq Tablet.er, 10 MEQ PO BID, (

Reported)


   Entered as Reported by: GERRI MONROE on 8/23/18 0853


   Last Action: Continued on 9/10/18 2358 by CLAUDIA KAMINSKI


Risperidone (Risperidone) 0.5 Mg Tablet, 0.5 MG PO BID, (Reported)


   Entered as Reported by: GERRI MONROE on 8/23/18 0853


   Last Action: Converted on 9/10/18 2358 by CLAUDIA KAMINSKI


Venlafaxine Hcl (Venlafaxine Hcl) 37.5 Mg Tablet, 50 MG PO BID, (Reported)


   Entered as Reported by: GERRI MONROE on 8/23/18 0853


   Last Action: Converted on 9/10/18 2358 by CLAUDIA KAMINSKI





Scheduled PRN


Oxycodone/Apap 5-325 (Percocet 5-325 Mg Tablet) 1 Each Tablet, 1-2 EACH PO PRN 

TID PRN for PAIN, #25


   pain 


   Prescribed by: KELL CRUZ MD on 8/26/18 1037


   Last Action: Continued on 9/10/18 2358 by CLAUDIA KAMINSKI


Oxycodone/Apap 5-325 (Percocet 5-325 Mg Tablet) 1 Each Tablet, 1-2 EACH PO PRN 

TID PRN for severe pain, #30


   pain 


   Prescribed by: JENNIFER STEEN D.O. on 9/2/18 1438


Phenazopyridine Hcl (Phenazopyridine Hcl) 100 Mg Tablet, 100 MG PO TID PRN for 

URINARY PAIN, (Reported)


   Entered as Reported by: GERRI MONROE on 8/23/18 0853


   Last Action: Converted on 9/10/18 2358 by CLAUDIA KAMINSKI


Promethazine Hcl (Promethazine Hcl) 25 Mg Tablet, 25 MG PO PRN Q6HRS PRN for 

NAUSEA/VOMITING, (Reported)


   Entered as Reported by: GERRI MONROE on 8/23/18 0853


   Last Action: Converted on 9/10/18 2358 by JOHANNA PINK MD Sep 12, 2018 12:37

## 2018-12-21 ENCOUNTER — HOSPITAL ENCOUNTER (INPATIENT)
Dept: HOSPITAL 63 - GEROPSY | Age: 83
LOS: 25 days | Discharge: NURSING, REHAB OR LONG-TERM CARE | DRG: 885 | End: 2019-01-15
Attending: PSYCHIATRY & NEUROLOGY | Admitting: PSYCHIATRY & NEUROLOGY
Payer: MEDICARE

## 2018-12-21 VITALS — SYSTOLIC BLOOD PRESSURE: 109 MMHG | DIASTOLIC BLOOD PRESSURE: 71 MMHG

## 2018-12-21 VITALS — WEIGHT: 173.56 LBS | HEIGHT: 64 IN | BODY MASS INDEX: 29.63 KG/M2

## 2018-12-21 DIAGNOSIS — Z99.81: ICD-10-CM

## 2018-12-21 DIAGNOSIS — J45.909: ICD-10-CM

## 2018-12-21 DIAGNOSIS — H91.90: ICD-10-CM

## 2018-12-21 DIAGNOSIS — I10: ICD-10-CM

## 2018-12-21 DIAGNOSIS — Z88.8: ICD-10-CM

## 2018-12-21 DIAGNOSIS — M15.9: ICD-10-CM

## 2018-12-21 DIAGNOSIS — Z79.899: ICD-10-CM

## 2018-12-21 DIAGNOSIS — M81.0: ICD-10-CM

## 2018-12-21 DIAGNOSIS — F31.30: Primary | ICD-10-CM

## 2018-12-21 DIAGNOSIS — Z66: ICD-10-CM

## 2018-12-21 DIAGNOSIS — Z80.3: ICD-10-CM

## 2018-12-21 DIAGNOSIS — E78.5: ICD-10-CM

## 2018-12-21 DIAGNOSIS — E43: ICD-10-CM

## 2018-12-21 DIAGNOSIS — I25.10: ICD-10-CM

## 2018-12-21 DIAGNOSIS — F02.81: ICD-10-CM

## 2018-12-21 DIAGNOSIS — J96.11: ICD-10-CM

## 2018-12-21 DIAGNOSIS — Z88.0: ICD-10-CM

## 2018-12-21 DIAGNOSIS — Z98.1: ICD-10-CM

## 2018-12-21 DIAGNOSIS — N39.0: ICD-10-CM

## 2018-12-21 DIAGNOSIS — Z91.5: ICD-10-CM

## 2018-12-21 DIAGNOSIS — E03.9: ICD-10-CM

## 2018-12-21 DIAGNOSIS — R32: ICD-10-CM

## 2018-12-21 DIAGNOSIS — F01.51: ICD-10-CM

## 2018-12-21 DIAGNOSIS — G89.29: ICD-10-CM

## 2018-12-21 DIAGNOSIS — G30.9: ICD-10-CM

## 2018-12-21 DIAGNOSIS — F63.9: ICD-10-CM

## 2018-12-21 DIAGNOSIS — F41.9: ICD-10-CM

## 2018-12-21 DIAGNOSIS — F09: ICD-10-CM

## 2018-12-21 DIAGNOSIS — Z88.2: ICD-10-CM

## 2018-12-21 PROCEDURE — 71045 X-RAY EXAM CHEST 1 VIEW: CPT

## 2018-12-21 PROCEDURE — 81001 URINALYSIS AUTO W/SCOPE: CPT

## 2018-12-21 PROCEDURE — 80061 LIPID PANEL: CPT

## 2018-12-21 PROCEDURE — 87186 SC STD MICRODIL/AGAR DIL: CPT

## 2018-12-21 PROCEDURE — 94760 N-INVAS EAR/PLS OXIMETRY 1: CPT

## 2018-12-21 PROCEDURE — 83540 ASSAY OF IRON: CPT

## 2018-12-21 PROCEDURE — 87493 C DIFF AMPLIFIED PROBE: CPT

## 2018-12-21 PROCEDURE — 83036 HEMOGLOBIN GLYCOSYLATED A1C: CPT

## 2018-12-21 PROCEDURE — 94640 AIRWAY INHALATION TREATMENT: CPT

## 2018-12-21 PROCEDURE — 82306 VITAMIN D 25 HYDROXY: CPT

## 2018-12-21 PROCEDURE — 80053 COMPREHEN METABOLIC PANEL: CPT

## 2018-12-21 PROCEDURE — 84436 ASSAY OF TOTAL THYROXINE: CPT

## 2018-12-21 PROCEDURE — 84443 ASSAY THYROID STIM HORMONE: CPT

## 2018-12-21 PROCEDURE — 86592 SYPHILIS TEST NON-TREP QUAL: CPT

## 2018-12-21 PROCEDURE — 87086 URINE CULTURE/COLONY COUNT: CPT

## 2018-12-21 PROCEDURE — 83735 ASSAY OF MAGNESIUM: CPT

## 2018-12-21 PROCEDURE — 80164 ASSAY DIPROPYLACETIC ACD TOT: CPT

## 2018-12-21 PROCEDURE — 36415 COLL VENOUS BLD VENIPUNCTURE: CPT

## 2018-12-21 PROCEDURE — 84480 ASSAY TRIIODOTHYRONINE (T3): CPT

## 2018-12-21 PROCEDURE — 83550 IRON BINDING TEST: CPT

## 2018-12-21 PROCEDURE — 85025 COMPLETE CBC W/AUTO DIFF WBC: CPT

## 2018-12-21 PROCEDURE — 82607 VITAMIN B-12: CPT

## 2018-12-21 RX ADMIN — ATORVASTATIN CALCIUM SCH MG: 20 TABLET, FILM COATED ORAL at 20:53

## 2018-12-21 RX ADMIN — Medication SCH EA: at 20:55

## 2018-12-21 RX ADMIN — ALBUTEROL SULFATE SCH MG: 108 AEROSOL, METERED RESPIRATORY (INHALATION) at 20:43

## 2018-12-21 RX ADMIN — DONEPEZIL HYDROCHLORIDE SCH MG: 10 TABLET ORAL at 20:53

## 2018-12-21 RX ADMIN — BUDESONIDE SCH MG: 0.5 SUSPENSION RESPIRATORY (INHALATION) at 20:43

## 2018-12-21 RX ADMIN — CARVEDILOL SCH MG: 6.25 TABLET, FILM COATED ORAL at 20:55

## 2018-12-21 NOTE — PDOC
Exam


Note:


Marquise Note:


Please also refer to the separate dictated note~for this date of service 

dictated separately. Discussed the patient with Nursing staff reviewed the 

chart.~Reviewed interim history and current functioning. Reviewed vital signs,~

Labs/ Radiology~and current medications noted below. Continue current treatment 

with the changes noted in the dictated addendum note





Assessment:


Vital Signs:





 Vital Signs








  Date Time  Temp Pulse Resp B/P (MAP) Pulse Ox O2 Delivery O2 Flow Rate FiO2


 


12/21/18 20:55  93  108/72    


 


12/21/18 20:45     93 Room Air  


 


12/21/18 18:33 98.9  22    2.0 











Current Medications:


Meds:





Current Medications


Multi-Ingredient Ointment (Analgesic Balm) 1 louis PRN QID  PRN TP MUSCLE PAIN;  

Start 12/21/18 at 18:45


Al Hydroxide/Mg Hydroxide (Mylanta Plus Xs) 15 ml PRN AFTMEALHC  PRN PO 

DYSPEPSIA;  Start 12/21/18 at 18:45


Magnesium Hydroxide (Milk Of Magnesia) 2,400 mg PRN QHS  PRN PO CONSTIPATION;  

Start 12/21/18 at 18:45


Aspirin (Aspirin Enteric Coated) 325 mg DAILY PO ;  Start 12/22/18 at 09:00


Atorvastatin Calcium (Lipitor) 20 mg QHS PO  Last administered on 12/21/18at 20:

53;  Start 12/21/18 at 21:00


Estrogens Conjugated (Premarin) 0.5 louis PRN DAILY  PRN VG vaginal dryness;  

Start 12/21/18 at 19:15


Acetaminophen/ Hydrocodone Bitart (Lortab 5/325) 1 tab PRN Q12HR  PRN PO PAIN;  

Start 12/21/18 at 19:15


Ipratropium Bromide (Atrovent) 0.2 mg PRN Q6HRS  PRN IH SHORTNESS OF BREATH;  

Start 12/21/18 at 19:15


Levothyroxine Sodium (Synthroid) 75 mcg DAILYAC PO ;  Start 12/22/18 at 07:30


Lisinopril (Prinivil) 10 mg DAILY PO ;  Start 12/22/18 at 09:00


Acetaminophen (Tylenol) 650 mg PRN Q6HRS  PRN PO PAIN / TEMP;  Start 12/21/18 

at 19:45


Non-Formulary Medication (Albuterol Sulfate (Albuterol Sulfate Conc Neb Soln)) 

2.5 mg PRN Q6HRS  PRN NEB SHORTNESS OF BREATH;  Start 12/21/18 at 19:15;  

Status UNV


Amlodipine Besylate (Norvasc) 10 mg DAILY PO ;  Start 12/22/18 at 09:00


Non-Formulary Medication (Budesonide/ Formoterol Fumarate (Symbicort 160-4.5 

Mcg Inhaler)) 2 puff BID IH ;  Start 12/21/18 at 21:00;  Status UNV


Carvedilol (Coreg) 6.25 mg BIDWMEALS PO  Last administered on 12/21/18at 20:55;

  Start 12/21/18 at 20:00


Divalproex Sodium (Depakote Er) 750 mg QHS PO  Last administered on 12/21/18at 

20:53;  Start 12/21/18 at 21:00


Docusate Sodium (Colace) 100 mg PRN DAILY  PRN PO HARD STOOLS;  Start 12/21/18 

at 19:45


Donepezil HCl (Aricept) 10 mg QHS PO  Last administered on 12/21/18at 20:53;  

Start 12/21/18 at 21:00


Enoxaparin Sodium (Lovenox 40mg Syringe) 40 mg DAILY SQ ;  Start 12/22/18 at 09:

00


Hydralazine HCl (Apresoline) 10 mg TID PO  Last administered on 12/21/18at 20:54

;  Start 12/21/18 at 21:00


Multivitamins/ Calcium (Thera-M Plus) 1 tab DAILY PO ;  Start 12/22/18 at 09:00


Olanzapine (ZyPREXA) 5 mg PRN DAILY  PRN PO ANXIETY/AGITATION;  Start 12/22/18 

at 09:00


Pantoprazole Sodium (Protonix) 40 mg DAILYAC PO ;  Start 12/22/18 at 07:30


Phenazopyridine HCl (Pyridium) 100 mg PRN TID  PRN PO URINARY PAIN;  Start 12/21 /18 at 19:45


Polyethylene Glycol (miraLAX) 17 gm PRN BID  PRN PO CONSTIPATION;  Start 12/22/ 18 at 09:00


Sodium Chloride (Saline Mist Nasal) 1 louis QID NS ;  Start 12/21/18 at 21:00;  

Status Cancel


Trazodone HCl (Desyrel) 25 mg PRN BID  PRN PO ANXIETY/AGITATION;  Start 12/21/ 18 at 19:45


Trazodone HCl (Desyrel) 50 mg PRN QHS  PRN PO INSOMNIA;  Start 12/21/18 at 19:45


Lidocaine (Lidoderm) 1 patch DAILY TD ;  Start 12/22/18 at 09:00


Multivitamins/ Minerals (I-Irina) 1 tab DAILY PO ;  Start 12/22/18 at 09:00


Miscellaneous (Lidoderm Patch Removal) 1 ea QHS MC  Last administered on 12/21/ 18at 20:55;  Start 12/21/18 at 21:00


Albuterol Sulfate (Ventolin) 2.5 mg PRN Q6HRS  PRN NEB SHORTNESS OF BREATH;  

Start 12/21/18 at 19:45


Albuterol Sulfate (Ventolin) 2.5 mg RTQID NEB  Last administered on 12/21/18at 

20:43;  Start 12/21/18 at 20:00


Budesonide (Pulmicort) 0.5 mg RTBID NEB  Last administered on 12/21/18at 20:43;

  Start 12/21/18 at 20:00


Sodium Chloride (Ayr Saline Nasal) 2 louis QID NS ;  Start 12/21/18 at 21:00





Active Scripts


Active


Reported


[occuvite]   1 Tab PO DAILY


Pyridium (Phenazopyridine Hcl) 100 Mg Tablet 100 Mg PO PRN TID PRN


Trazodone Hcl 50 Mg Tablet 25 Mg PO PRN QHS PRN


Premarin (Estrogens, Conjugated) 30 Gm Cream.appl 0.5 Gm VG PRN DAILY PRN


Multivitamins (Multivitamin) 1 Each Tablet 1 Tab PO DAILY


Trazodone Hcl 50 Mg Tablet 25 Mg PO BID PRN


Ayr Saline (Sodium Chloride) 50 Ml Drops 2 Drop NS QID


Miralax (Polyethylene Glycol 3350) 17 Gm Powd.pack 17 Gm PO PRN BID PRN


Protonix (Pantoprazole Sodium) 40 Mg Tablet.dr 40 Mg PO DAILY


Zyprexa (Olanzapine) 5 Mg Tablet 5 Mg PO PRN DAILY PRN


Lisinopril 10 Mg Tablet 10 Mg PO DAILY


[lidoderm]   1 Patch TD DAILY


Levothyroxine Sodium 75 Mcg Tablet 75 Mcg PO DAILYAC


Ipratropium Bromide 0.2 Mg/1 Ml Solution 0.2 Mg IH PRN Q6HRS PRN


Hydrocodone-Apap 5-325  ** (Hydrocodone Bit/Acetaminophen) 1 Each Tablet 1 Tab 

PO PRN Q12HR PRN


Hydralazine Hcl 10 Mg Tablet 10 Mg PO TID


Lovenox (Enoxaparin Sodium) 40 Mg/0.4 Ml Disp.syrin 40 Mg SQ DAILY


Donepezil Hcl 10 Mg Tablet 10 Mg PO HS


Colace (Docusate Sodium) 100 Mg Capsule 100 Mg PO PRN DAILY PRN


Depakote Er (Divalproex Sodium) 500 Mg Tab.er.24h 750 Mg PO HS


Coreg  ** (Carvedilol) 6.25 Mg Tablet 6.25 Mg PO BIDWMEALS


Symbicort 160-4.5 Mcg Inhaler (Budesonide/Formoterol Fumarate) 10.2 Gm 

Hfa.aer.ad 2 Puff IH BID


Lipitor (Atorvastatin Calcium) 20 Mg Tablet 20 Mg PO QHS


Aspirin Ec (Aspirin) 325 Mg Tablet.dr 325 Mg PO DAILY


Amlodipine Besylate 10 Mg Tablet 10 Mg PO DAILY


Albuterol Sulfate Conc Neb Soln (Albuterol Sulfate) 2.5 Mg/0.5 Ml Vial.neb 2.5 

Mg NEB PRN Q6HRS PRN


Acetaminophen 650 Mg/20.3 Ml Solution 650 Mg PO PRN Q6HRS PRN


I have reviewed the current psychotropics carefully including drug 

interactions.  Risk benefit ratio favors no change other than as noted in my 

dictated progress note.





Diagnosis:


Problems:  


(1) Anxiety disorder


(2) Major depressive disorder, recurrent episode


(3) Psychosis, atypical


(4) Impulse control disorder











JULY ZAMUDIO MD Dec 21, 2018 22:46

## 2018-12-22 VITALS — DIASTOLIC BLOOD PRESSURE: 68 MMHG | SYSTOLIC BLOOD PRESSURE: 154 MMHG

## 2018-12-22 VITALS — DIASTOLIC BLOOD PRESSURE: 89 MMHG | SYSTOLIC BLOOD PRESSURE: 134 MMHG

## 2018-12-22 LAB
ALBUMIN SERPL-MCNC: 2.7 G/DL (ref 3.4–5)
ALBUMIN/GLOB SERPL: 0.8 {RATIO} (ref 1–1.7)
ALP SERPL-CCNC: 43 U/L (ref 46–116)
ALT SERPL-CCNC: 21 U/L (ref 14–59)
ANION GAP SERPL CALC-SCNC: 6 MMOL/L (ref 6–14)
APTT PPP: (no result) S
AST SERPL-CCNC: 16 U/L (ref 15–37)
BACTERIA #/AREA URNS HPF: (no result) /HPF
BASOPHILS # BLD AUTO: 0 X10^3/UL (ref 0–0.2)
BASOPHILS NFR BLD: 1 % (ref 0–3)
BILIRUB SERPL-MCNC: 0.3 MG/DL (ref 0.2–1)
BILIRUB UR QL STRIP: (no result)
BUN/CREAT SERPL: 29 (ref 6–20)
CA-I SERPL ISE-MCNC: 23 MG/DL (ref 7–20)
CALCIUM SERPL-MCNC: 8.4 MG/DL (ref 8.5–10.1)
CHLORIDE SERPL-SCNC: 106 MMOL/L (ref 98–107)
CO2 SERPL-SCNC: 33 MMOL/L (ref 21–32)
CREAT SERPL-MCNC: 0.8 MG/DL (ref 0.6–1)
EOSINOPHIL NFR BLD: 0.2 X10^3/UL (ref 0–0.7)
EOSINOPHIL NFR BLD: 3 % (ref 0–3)
ERYTHROCYTE [DISTWIDTH] IN BLOOD BY AUTOMATED COUNT: 16.1 % (ref 11.5–14.5)
FIBRINOGEN PPP-MCNC: (no result) MG/DL
GFR SERPLBLD BASED ON 1.73 SQ M-ARVRAT: 67.8 ML/MIN
GLOBULIN SER-MCNC: 3.3 G/DL (ref 2.2–3.8)
GLUCOSE SERPL-MCNC: 99 MG/DL (ref 70–99)
GLUCOSE UR STRIP-MCNC: (no result) MG/DL
HCT VFR BLD CALC: 38.8 % (ref 36–47)
HGB BLD-MCNC: 12.8 G/DL (ref 12–15.5)
HYALINE CASTS #/AREA URNS LPF: (no result) /HPF
LYMPHOCYTES # BLD: 1.5 X10^3/UL (ref 1–4.8)
LYMPHOCYTES NFR BLD AUTO: 22 % (ref 24–48)
MAGNESIUM SERPL-MCNC: 2 MG/DL (ref 1.8–2.4)
MCH RBC QN AUTO: 31 PG (ref 25–35)
MCHC RBC AUTO-ENTMCNC: 33 G/DL (ref 31–37)
MCV RBC AUTO: 94 FL (ref 79–100)
MONO #: 0.7 X10^3/UL (ref 0–1.1)
MONOCYTES NFR BLD: 11 % (ref 0–9)
NEUT #: 4.3 X10^3UL (ref 1.8–7.7)
NEUTROPHILS NFR BLD AUTO: 64 % (ref 31–73)
NITRITE UR QL STRIP: (no result)
PLATELET # BLD AUTO: 205 X10^3/UL (ref 140–400)
POTASSIUM SERPL-SCNC: 3.9 MMOL/L (ref 3.5–5.1)
PROT SERPL-MCNC: 6 G/DL (ref 6.4–8.2)
RBC # BLD AUTO: 4.15 X10^6/UL (ref 3.5–5.4)
RBC #/AREA URNS HPF: (no result) /HPF (ref 0–2)
SODIUM SERPL-SCNC: 145 MMOL/L (ref 136–145)
SP GR UR STRIP: 1.01
SQUAMOUS #/AREA URNS LPF: (no result) /LPF
UROBILINOGEN UR-MCNC: 0.2 MG/DL
WBC # BLD AUTO: 6.7 X10^3/UL (ref 4–11)

## 2018-12-22 RX ADMIN — CARVEDILOL SCH MG: 6.25 TABLET, FILM COATED ORAL at 08:42

## 2018-12-22 RX ADMIN — ALBUTEROL SULFATE SCH MG: 108 AEROSOL, METERED RESPIRATORY (INHALATION) at 15:35

## 2018-12-22 RX ADMIN — ASPIRIN SCH MG: 325 TABLET, DELAYED RELEASE ORAL at 08:42

## 2018-12-22 RX ADMIN — MULTIPLE VITAMINS W/ MINERALS TAB SCH TAB: TAB at 08:42

## 2018-12-22 RX ADMIN — ALBUTEROL SULFATE SCH MG: 108 AEROSOL, METERED RESPIRATORY (INHALATION) at 05:15

## 2018-12-22 RX ADMIN — BUDESONIDE SCH MG: 0.5 SUSPENSION RESPIRATORY (INHALATION) at 10:05

## 2018-12-22 RX ADMIN — ALBUTEROL SULFATE SCH MG: 108 AEROSOL, METERED RESPIRATORY (INHALATION) at 20:33

## 2018-12-22 RX ADMIN — ALBUTEROL SULFATE SCH MG: 108 AEROSOL, METERED RESPIRATORY (INHALATION) at 10:05

## 2018-12-22 RX ADMIN — ENOXAPARIN SODIUM SCH MG: 100 INJECTION SUBCUTANEOUS at 08:40

## 2018-12-22 RX ADMIN — I-VITE, TAB 1000-60-2MG (60/BT) SCH TAB: TAB at 08:40

## 2018-12-22 RX ADMIN — LISINOPRIL SCH MG: 10 TABLET ORAL at 08:41

## 2018-12-22 RX ADMIN — DONEPEZIL HYDROCHLORIDE SCH MG: 10 TABLET ORAL at 21:11

## 2018-12-22 RX ADMIN — BUDESONIDE SCH MG: 0.5 SUSPENSION RESPIRATORY (INHALATION) at 20:33

## 2018-12-22 RX ADMIN — DIVALPROEX SODIUM SCH MG: 125 CAPSULE, COATED PELLETS ORAL at 21:18

## 2018-12-22 RX ADMIN — LIDOCAINE SCH PATCH: 50 PATCH CUTANEOUS at 08:39

## 2018-12-22 RX ADMIN — Medication SCH EA: at 21:00

## 2018-12-22 RX ADMIN — ATORVASTATIN CALCIUM SCH MG: 20 TABLET, FILM COATED ORAL at 21:11

## 2018-12-22 RX ADMIN — PANTOPRAZOLE SODIUM SCH MG: 40 TABLET, DELAYED RELEASE ORAL at 08:42

## 2018-12-22 RX ADMIN — CARVEDILOL SCH MG: 6.25 TABLET, FILM COATED ORAL at 17:22

## 2018-12-22 NOTE — PDOC
Exam


Note:


Marquise Note:


Please also refer to the separate dictated note~for this date of service 

dictated separately.~Patient seen individually. Discussed the patient with 

Nursing staff reviewed the chart.~Reviewed interim history and current 

functioning. Reviewed vital signs,~Labs/ Radiology~and current medications 

noted below. Continue current treatment with the changes noted in the dictated 

addendum note





Assessment:


Vital Signs:





 Vital Signs








  Date Time  Temp Pulse Resp B/P (MAP) Pulse Ox O2 Delivery O2 Flow Rate FiO2


 


12/22/18 21:11  79  154/68    


 


12/22/18 20:30     93 Nasal Cannula 2.0 


 


12/22/18 16:15 98.2  18     








Labs:





 Laboratory Tests








Test


 12/22/18


12:55 12/22/18


16:25


 


Urine Collection Type Unknown   


 


Urine Color Jammie   


 


Urine Clarity Hazy   


 


Urine pH 7.0   


 


Urine Specific Gravity 1.015   


 


Urine Protein


 Neg


(NEG-TRACE) 





 


Urine Glucose (UA)


 Neg mg/dL


(NEG) 





 


Urine Ketones (Stick)


 15 mg/dL (NEG)


 





 


Urine Blood Neg (NEG)   


 


Urine Nitrite Neg (NEG)   


 


Urine Bilirubin Neg (NEG)   


 


Urine Urobilinogen Dipstick


 0.2 mg/dL (0.2


mg/dL) 





 


Urine Leukocyte Esterase Small (NEG)   


 


Urine RBC


 1-2 /HPF (0-2)


 





 


Urine WBC


 11-20 /HPF


(0-4) 





 


Urine Squamous Epithelial


Cells Few /LPF  


 





 


Urine Bacteria


 Few /HPF


(0-FEW) 





 


Urine Hyaline Casts Occ /HPF   


 


Urine Mucus Slight /LPF   


 


White Blood Count


 


 6.7 x10^3/uL


(4.0-11.0)


 


Red Blood Count


 


 4.15 x10^6/uL


(3.50-5.40)


 


Hemoglobin


 


 12.8 g/dL


(12.0-15.5)


 


Hematocrit


 


 38.8 %


(36.0-47.0)


 


Mean Corpuscular Volume


 


 94 fL ()





 


Mean Corpuscular Hemoglobin  31 pg (25-35)  


 


Mean Corpuscular Hemoglobin


Concent 


 33 g/dL


(31-37)


 


Red Cell Distribution Width


 


 16.1 %


(11.5-14.5)  H


 


Platelet Count


 


 205 x10^3/uL


(140-400)


 


Neutrophils (%) (Auto)  64 % (31-73)  


 


Lymphocytes (%) (Auto)  22 % (24-48)  L


 


Monocytes (%) (Auto)  11 % (0-9)  H


 


Eosinophils (%) (Auto)  3 % (0-3)  


 


Basophils (%) (Auto)  1 % (0-3)  


 


Neutrophils # (Auto)


 


 4.3 x10^3uL


(1.8-7.7)


 


Lymphocytes # (Auto)


 


 1.5 x10^3/uL


(1.0-4.8)


 


Monocytes # (Auto)


 


 0.7 x10^3/uL


(0.0-1.1)


 


Eosinophils # (Auto)


 


 0.2 x10^3/uL


(0.0-0.7)


 


Basophils # (Auto)


 


 0.0 x10^3/uL


(0.0-0.2)


 


Sodium Level


 


 145 mmol/L


(136-145)


 


Potassium Level


 


 3.9 mmol/L


(3.5-5.1)


 


Chloride Level


 


 106 mmol/L


()


 


Carbon Dioxide Level


 


 33 mmol/L


(21-32)  H


 


Anion Gap  6 (6-14)  


 


Blood Urea Nitrogen


 


 23 mg/dL


(7-20)  H


 


Creatinine


 


 0.8 mg/dL


(0.6-1.0)


 


Estimated GFR


(Cockcroft-Gault) 


 67.8  





 


BUN/Creatinine Ratio  29 (6-20)  H


 


Glucose Level


 


 99 mg/dL


(70-99)


 


Calcium Level


 


 8.4 mg/dL


(8.5-10.1)  L


 


Magnesium Level


 


 2.0 mg/dL


(1.8-2.4)


 


Total Bilirubin


 


 0.3 mg/dL


(0.2-1.0)


 


Aspartate Amino Transferase


(AST) 


 16 U/L (15-37)





 


Alanine Aminotransferase (ALT)


 


 21 U/L (14-59)





 


Alkaline Phosphatase


 


 43 U/L


()  L


 


Total Protein


 


 6.0 g/dL


(6.4-8.2)  L


 


Albumin


 


 2.7 g/dL


(3.4-5.0)  L


 


Albumin/Globulin Ratio


 


 0.8 (1.0-1.7)


L











Current Medications:


Meds:





Current Medications


Multi-Ingredient Ointment (Analgesic Balm) 1 louis PRN QID  PRN TP MUSCLE PAIN;  

Start 12/21/18 at 18:45


Al Hydroxide/Mg Hydroxide (Mylanta Plus Xs) 15 ml PRN AFTMEALHC  PRN PO 

DYSPEPSIA;  Start 12/21/18 at 18:45


Magnesium Hydroxide (Milk Of Magnesia) 2,400 mg PRN QHS  PRN PO CONSTIPATION;  

Start 12/21/18 at 18:45


Aspirin (Aspirin Enteric Coated) 325 mg DAILY PO  Last administered on 12/22/ 18at 08:42;  Start 12/22/18 at 09:00


Atorvastatin Calcium (Lipitor) 20 mg QHS PO  Last administered on 12/22/18at 21:

11;  Start 12/21/18 at 21:00


Estrogens Conjugated (Premarin) 0.5 louis PRN DAILY  PRN VG vaginal dryness;  

Start 12/21/18 at 19:15


Acetaminophen/ Hydrocodone Bitart (Lortab 5/325) 1 tab PRN Q12HR  PRN PO PAIN;  

Start 12/21/18 at 19:15


Ipratropium Bromide (Atrovent) 0.2 mg PRN Q6HRS  PRN IH SHORTNESS OF BREATH;  

Start 12/21/18 at 19:15


Levothyroxine Sodium (Synthroid) 75 mcg DAILYAC PO  Last administered on 12/22/ 18at 08:40;  Start 12/22/18 at 07:30;  Stop 12/22/18 at 12:26;  Status DC


Lisinopril (Prinivil) 10 mg DAILY PO  Last administered on 12/22/18at 08:41;  

Start 12/22/18 at 09:00


Acetaminophen (Tylenol) 650 mg PRN Q6HRS  PRN PO PAIN / TEMP;  Start 12/21/18 

at 19:45


Non-Formulary Medication (Albuterol Sulfate (Albuterol Sulfate Conc Neb Soln)) 

2.5 mg PRN Q6HRS  PRN NEB SHORTNESS OF BREATH;  Start 12/21/18 at 19:15;  

Status UNV


Amlodipine Besylate (Norvasc) 10 mg DAILY PO  Last administered on 12/22/18at 08

:40;  Start 12/22/18 at 09:00


Non-Formulary Medication (Budesonide/ Formoterol Fumarate (Symbicort 160-4.5 

Mcg Inhaler)) 2 puff BID IH ;  Start 12/21/18 at 21:00;  Status UNV


Carvedilol (Coreg) 6.25 mg BIDWMEALS PO  Last administered on 12/22/18at 17:22;

  Start 12/21/18 at 20:00


Divalproex Sodium (Depakote Er) 750 mg QHS PO  Last administered on 12/21/18at 

20:53;  Start 12/21/18 at 21:00;  Stop 12/22/18 at 21:13;  Status DC


Docusate Sodium (Colace) 100 mg PRN DAILY  PRN PO HARD STOOLS;  Start 12/21/18 

at 19:45


Donepezil HCl (Aricept) 10 mg QHS PO  Last administered on 12/22/18at 21:11;  

Start 12/21/18 at 21:00


Enoxaparin Sodium (Lovenox 40mg Syringe) 40 mg DAILY SQ  Last administered on 12 /22/18at 08:40;  Start 12/22/18 at 09:00


Hydralazine HCl (Apresoline) 10 mg TID PO  Last administered on 12/22/18at 21:11

;  Start 12/21/18 at 21:00


Multivitamins/ Calcium (Thera-M Plus) 1 tab DAILY PO  Last administered on 12/22 /18at 08:42;  Start 12/22/18 at 09:00


Olanzapine (ZyPREXA) 5 mg PRN DAILY  PRN PO ANXIETY/AGITATION Last administered 

on 12/22/18at 12:35;  Start 12/22/18 at 09:00


Pantoprazole Sodium (Protonix) 40 mg DAILYAC PO  Last administered on 12/22/ 18at 08:42;  Start 12/22/18 at 07:30


Phenazopyridine HCl (Pyridium) 100 mg PRN TID  PRN PO URINARY PAIN;  Start 12/21 /18 at 19:45


Polyethylene Glycol (miraLAX) 17 gm PRN BID  PRN PO CONSTIPATION;  Start 12/22/ 18 at 09:00


Sodium Chloride (Saline Mist Nasal) 1 louis QID NS ;  Start 12/21/18 at 21:00;  

Status Cancel


Trazodone HCl (Desyrel) 25 mg PRN BID  PRN PO ANXIETY/AGITATION;  Start 12/21/ 18 at 19:45


Trazodone HCl (Desyrel) 50 mg PRN QHS  PRN PO INSOMNIA;  Start 12/21/18 at 19:45


Lidocaine (Lidoderm) 1 patch DAILY TD  Last administered on 12/22/18at 08:39;  

Start 12/22/18 at 09:00


Multivitamins/ Minerals (I-Irina) 1 tab DAILY PO  Last administered on 12/22/ 18at 08:40;  Start 12/22/18 at 09:00


Miscellaneous (Lidoderm Patch Removal) 1 ea QHS MC  Last administered on 12/22/ 18at 21:00;  Start 12/21/18 at 21:00


Albuterol Sulfate (Ventolin) 2.5 mg PRN Q6HRS  PRN NEB SHORTNESS OF BREATH;  

Start 12/21/18 at 19:45


Albuterol Sulfate (Ventolin) 2.5 mg RTQID NEB  Last administered on 12/22/18at 

20:33;  Start 12/21/18 at 20:00


Budesonide (Pulmicort) 0.5 mg RTBID NEB  Last administered on 12/22/18at 20:33;

  Start 12/21/18 at 20:00


Sodium Chloride (Ayr Saline Nasal) 2 louis QID NS ;  Start 12/21/18 at 21:00;  

Stop 12/22/18 at 13:08;  Status DC


Levothyroxine Sodium (Synthroid) 75 mcg DAILY06 PO ;  Start 12/23/18 at 06:00


Sodium Chloride (Saline Mist Nasal) 1 louis PRN QID  PRN NS NASAL CONGESTION;  

Start 12/22/18 at 13:15


Divalproex Sodium (Depakote Sprinkles) 750 mg HS PO  Last administered on 12/22/ 18at 21:18;  Start 12/22/18 at 21:30





Active Scripts


Active


Reported


[occuvite]   1 Tab PO DAILY


Pyridium (Phenazopyridine Hcl) 100 Mg Tablet 100 Mg PO PRN TID PRN


Trazodone Hcl 50 Mg Tablet 25 Mg PO PRN QHS PRN


Premarin (Estrogens, Conjugated) 30 Gm Cream.appl 0.5 Gm VG PRN DAILY PRN


Multivitamins (Multivitamin) 1 Each Tablet 1 Tab PO DAILY


Trazodone Hcl 50 Mg Tablet 25 Mg PO BID PRN


Ayr Saline (Sodium Chloride) 50 Ml Drops 2 Drop NS QID


Miralax (Polyethylene Glycol 3350) 17 Gm Powd.pack 17 Gm PO PRN BID PRN


Protonix (Pantoprazole Sodium) 40 Mg Tablet.dr 40 Mg PO DAILY


Zyprexa (Olanzapine) 5 Mg Tablet 5 Mg PO PRN DAILY PRN


Lisinopril 10 Mg Tablet 10 Mg PO DAILY


[lidoderm]   1 Patch TD DAILY


Levothyroxine Sodium 75 Mcg Tablet 75 Mcg PO DAILYAC


Ipratropium Bromide 0.2 Mg/1 Ml Solution 0.2 Mg IH PRN Q6HRS PRN


Hydrocodone-Apap 5-325  ** (Hydrocodone Bit/Acetaminophen) 1 Each Tablet 1 Tab 

PO PRN Q12HR PRN


Hydralazine Hcl 10 Mg Tablet 10 Mg PO TID


Lovenox (Enoxaparin Sodium) 40 Mg/0.4 Ml Disp.syrin 40 Mg SQ DAILY


Donepezil Hcl 10 Mg Tablet 10 Mg PO HS


Colace (Docusate Sodium) 100 Mg Capsule 100 Mg PO PRN DAILY PRN


Depakote Er (Divalproex Sodium) 500 Mg Tab.er.24h 750 Mg PO HS


Coreg  ** (Carvedilol) 6.25 Mg Tablet 6.25 Mg PO BIDWMEALS


Symbicort 160-4.5 Mcg Inhaler (Budesonide/Formoterol Fumarate) 10.2 Gm 

Hfa.aer.ad 2 Puff IH BID


Lipitor (Atorvastatin Calcium) 20 Mg Tablet 20 Mg PO QHS


Aspirin Ec (Aspirin) 325 Mg Tablet.dr 325 Mg PO DAILY


Amlodipine Besylate 10 Mg Tablet 10 Mg PO DAILY


Albuterol Sulfate Conc Neb Soln (Albuterol Sulfate) 2.5 Mg/0.5 Ml Vial.neb 2.5 

Mg NEB PRN Q6HRS PRN


Acetaminophen 650 Mg/20.3 Ml Solution 650 Mg PO PRN Q6HRS PRN


I have reviewed the current psychotropics carefully including drug 

interactions.  Risk benefit ratio favors no change other than as noted in my 

dictated progress note.





Diagnosis:


Problems:  


(1) Anxiety disorder


(2) Major depressive disorder, recurrent episode


(3) Psychosis, atypical


(4) Impulse control disorder











JULY ZAMUDIO MD Dec 22, 2018 22:07
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
## 2018-12-22 NOTE — PDOC
Exam


Note:


Marquise Note:


Please also refer to the separate dictated note~for this date of service 

dictated separately.~Patient seen individually. Discussed the patient with 

Nursing staff reviewed the chart.~Reviewed interim history and current 

functioning. Reviewed vital signs,~Labs/ Radiology~and current medications 

noted below. Continue current treatment with the changes noted in the dictated 

addendum note





Assessment:


Vital Signs:





 Vital Signs








  Date Time  Temp Pulse Resp B/P (MAP) Pulse Ox O2 Delivery O2 Flow Rate FiO2


 


12/22/18 21:11  79  154/68    


 


12/22/18 20:30     93 Nasal Cannula 2.0 


 


12/22/18 16:15 98.2  18     








Labs:





 Laboratory Tests








Test


 12/22/18


12:55 12/22/18


16:25


 


Urine Collection Type Unknown   


 


Urine Color Jammie   


 


Urine Clarity Hazy   


 


Urine pH 7.0   


 


Urine Specific Gravity 1.015   


 


Urine Protein


 Neg


(NEG-TRACE) 





 


Urine Glucose (UA)


 Neg mg/dL


(NEG) 





 


Urine Ketones (Stick)


 15 mg/dL (NEG)


 





 


Urine Blood Neg (NEG)   


 


Urine Nitrite Neg (NEG)   


 


Urine Bilirubin Neg (NEG)   


 


Urine Urobilinogen Dipstick


 0.2 mg/dL (0.2


mg/dL) 





 


Urine Leukocyte Esterase Small (NEG)   


 


Urine RBC


 1-2 /HPF (0-2)


 





 


Urine WBC


 11-20 /HPF


(0-4) 





 


Urine Squamous Epithelial


Cells Few /LPF  


 





 


Urine Bacteria


 Few /HPF


(0-FEW) 





 


Urine Hyaline Casts Occ /HPF   


 


Urine Mucus Slight /LPF   


 


White Blood Count


 


 6.7 x10^3/uL


(4.0-11.0)


 


Red Blood Count


 


 4.15 x10^6/uL


(3.50-5.40)


 


Hemoglobin


 


 12.8 g/dL


(12.0-15.5)


 


Hematocrit


 


 38.8 %


(36.0-47.0)


 


Mean Corpuscular Volume


 


 94 fL ()





 


Mean Corpuscular Hemoglobin  31 pg (25-35)  


 


Mean Corpuscular Hemoglobin


Concent 


 33 g/dL


(31-37)


 


Red Cell Distribution Width


 


 16.1 %


(11.5-14.5)  H


 


Platelet Count


 


 205 x10^3/uL


(140-400)


 


Neutrophils (%) (Auto)  64 % (31-73)  


 


Lymphocytes (%) (Auto)  22 % (24-48)  L


 


Monocytes (%) (Auto)  11 % (0-9)  H


 


Eosinophils (%) (Auto)  3 % (0-3)  


 


Basophils (%) (Auto)  1 % (0-3)  


 


Neutrophils # (Auto)


 


 4.3 x10^3uL


(1.8-7.7)


 


Lymphocytes # (Auto)


 


 1.5 x10^3/uL


(1.0-4.8)


 


Monocytes # (Auto)


 


 0.7 x10^3/uL


(0.0-1.1)


 


Eosinophils # (Auto)


 


 0.2 x10^3/uL


(0.0-0.7)


 


Basophils # (Auto)


 


 0.0 x10^3/uL


(0.0-0.2)


 


Sodium Level


 


 145 mmol/L


(136-145)


 


Potassium Level


 


 3.9 mmol/L


(3.5-5.1)


 


Chloride Level


 


 106 mmol/L


()


 


Carbon Dioxide Level


 


 33 mmol/L


(21-32)  H


 


Anion Gap  6 (6-14)  


 


Blood Urea Nitrogen


 


 23 mg/dL


(7-20)  H


 


Creatinine


 


 0.8 mg/dL


(0.6-1.0)


 


Estimated GFR


(Cockcroft-Gault) 


 67.8  





 


BUN/Creatinine Ratio  29 (6-20)  H


 


Glucose Level


 


 99 mg/dL


(70-99)


 


Calcium Level


 


 8.4 mg/dL


(8.5-10.1)  L


 


Magnesium Level


 


 2.0 mg/dL


(1.8-2.4)


 


Total Bilirubin


 


 0.3 mg/dL


(0.2-1.0)


 


Aspartate Amino Transferase


(AST) 


 16 U/L (15-37)





 


Alanine Aminotransferase (ALT)


 


 21 U/L (14-59)





 


Alkaline Phosphatase


 


 43 U/L


()  L


 


Total Protein


 


 6.0 g/dL


(6.4-8.2)  L


 


Albumin


 


 2.7 g/dL


(3.4-5.0)  L


 


Albumin/Globulin Ratio


 


 0.8 (1.0-1.7)


L











Current Medications:


Meds:





Current Medications


Multi-Ingredient Ointment (Analgesic Balm) 1 louis PRN QID  PRN TP MUSCLE PAIN;  

Start 12/21/18 at 18:45


Al Hydroxide/Mg Hydroxide (Mylanta Plus Xs) 15 ml PRN AFTMEALHC  PRN PO 

DYSPEPSIA;  Start 12/21/18 at 18:45


Magnesium Hydroxide (Milk Of Magnesia) 2,400 mg PRN QHS  PRN PO CONSTIPATION;  

Start 12/21/18 at 18:45


Aspirin (Aspirin Enteric Coated) 325 mg DAILY PO  Last administered on 12/22/ 18at 08:42;  Start 12/22/18 at 09:00


Atorvastatin Calcium (Lipitor) 20 mg QHS PO  Last administered on 12/22/18at 21:

11;  Start 12/21/18 at 21:00


Estrogens Conjugated (Premarin) 0.5 luois PRN DAILY  PRN VG vaginal dryness;  

Start 12/21/18 at 19:15


Acetaminophen/ Hydrocodone Bitart (Lortab 5/325) 1 tab PRN Q12HR  PRN PO PAIN;  

Start 12/21/18 at 19:15


Ipratropium Bromide (Atrovent) 0.2 mg PRN Q6HRS  PRN IH SHORTNESS OF BREATH;  

Start 12/21/18 at 19:15


Levothyroxine Sodium (Synthroid) 75 mcg DAILYAC PO  Last administered on 12/22/ 18at 08:40;  Start 12/22/18 at 07:30;  Stop 12/22/18 at 12:26;  Status DC


Lisinopril (Prinivil) 10 mg DAILY PO  Last administered on 12/22/18at 08:41;  

Start 12/22/18 at 09:00


Acetaminophen (Tylenol) 650 mg PRN Q6HRS  PRN PO PAIN / TEMP;  Start 12/21/18 

at 19:45


Non-Formulary Medication (Albuterol Sulfate (Albuterol Sulfate Conc Neb Soln)) 

2.5 mg PRN Q6HRS  PRN NEB SHORTNESS OF BREATH;  Start 12/21/18 at 19:15;  

Status UNV


Amlodipine Besylate (Norvasc) 10 mg DAILY PO  Last administered on 12/22/18at 08

:40;  Start 12/22/18 at 09:00


Non-Formulary Medication (Budesonide/ Formoterol Fumarate (Symbicort 160-4.5 

Mcg Inhaler)) 2 puff BID IH ;  Start 12/21/18 at 21:00;  Status UNV


Carvedilol (Coreg) 6.25 mg BIDWMEALS PO  Last administered on 12/22/18at 17:22;

  Start 12/21/18 at 20:00


Divalproex Sodium (Depakote Er) 750 mg QHS PO  Last administered on 12/21/18at 

20:53;  Start 12/21/18 at 21:00;  Stop 12/22/18 at 21:13;  Status DC


Docusate Sodium (Colace) 100 mg PRN DAILY  PRN PO HARD STOOLS;  Start 12/21/18 

at 19:45


Donepezil HCl (Aricept) 10 mg QHS PO  Last administered on 12/22/18at 21:11;  

Start 12/21/18 at 21:00


Enoxaparin Sodium (Lovenox 40mg Syringe) 40 mg DAILY SQ  Last administered on 12 /22/18at 08:40;  Start 12/22/18 at 09:00


Hydralazine HCl (Apresoline) 10 mg TID PO  Last administered on 12/22/18at 21:11

;  Start 12/21/18 at 21:00


Multivitamins/ Calcium (Thera-M Plus) 1 tab DAILY PO  Last administered on 12/22 /18at 08:42;  Start 12/22/18 at 09:00


Olanzapine (ZyPREXA) 5 mg PRN DAILY  PRN PO ANXIETY/AGITATION Last administered 

on 12/22/18at 12:35;  Start 12/22/18 at 09:00


Pantoprazole Sodium (Protonix) 40 mg DAILYAC PO  Last administered on 12/22/ 18at 08:42;  Start 12/22/18 at 07:30


Phenazopyridine HCl (Pyridium) 100 mg PRN TID  PRN PO URINARY PAIN;  Start 12/21 /18 at 19:45


Polyethylene Glycol (miraLAX) 17 gm PRN BID  PRN PO CONSTIPATION;  Start 12/22/ 18 at 09:00


Sodium Chloride (Saline Mist Nasal) 1 louis QID NS ;  Start 12/21/18 at 21:00;  

Status Cancel


Trazodone HCl (Desyrel) 25 mg PRN BID  PRN PO ANXIETY/AGITATION;  Start 12/21/ 18 at 19:45


Trazodone HCl (Desyrel) 50 mg PRN QHS  PRN PO INSOMNIA;  Start 12/21/18 at 19:45


Lidocaine (Lidoderm) 1 patch DAILY TD  Last administered on 12/22/18at 08:39;  

Start 12/22/18 at 09:00


Multivitamins/ Minerals (I-Irina) 1 tab DAILY PO  Last administered on 12/22/ 18at 08:40;  Start 12/22/18 at 09:00


Miscellaneous (Lidoderm Patch Removal) 1 ea QHS MC  Last administered on 12/22/ 18at 21:00;  Start 12/21/18 at 21:00


Albuterol Sulfate (Ventolin) 2.5 mg PRN Q6HRS  PRN NEB SHORTNESS OF BREATH;  

Start 12/21/18 at 19:45


Albuterol Sulfate (Ventolin) 2.5 mg RTQID NEB  Last administered on 12/22/18at 

20:33;  Start 12/21/18 at 20:00


Budesonide (Pulmicort) 0.5 mg RTBID NEB  Last administered on 12/22/18at 20:33;

  Start 12/21/18 at 20:00


Sodium Chloride (Ayr Saline Nasal) 2 louis QID NS ;  Start 12/21/18 at 21:00;  

Stop 12/22/18 at 13:08;  Status DC


Levothyroxine Sodium (Synthroid) 75 mcg DAILY06 PO ;  Start 12/23/18 at 06:00


Sodium Chloride (Saline Mist Nasal) 1 louis PRN QID  PRN NS NASAL CONGESTION;  

Start 12/22/18 at 13:15


Divalproex Sodium (Depakote Sprinkles) 750 mg HS PO  Last administered on 12/22/ 18at 21:18;  Start 12/22/18 at 21:30





Active Scripts


Active


Reported


[occuvite]   1 Tab PO DAILY


Pyridium (Phenazopyridine Hcl) 100 Mg Tablet 100 Mg PO PRN TID PRN


Trazodone Hcl 50 Mg Tablet 25 Mg PO PRN QHS PRN


Premarin (Estrogens, Conjugated) 30 Gm Cream.appl 0.5 Gm VG PRN DAILY PRN


Multivitamins (Multivitamin) 1 Each Tablet 1 Tab PO DAILY


Trazodone Hcl 50 Mg Tablet 25 Mg PO BID PRN


Ayr Saline (Sodium Chloride) 50 Ml Drops 2 Drop NS QID


Miralax (Polyethylene Glycol 3350) 17 Gm Powd.pack 17 Gm PO PRN BID PRN


Protonix (Pantoprazole Sodium) 40 Mg Tablet.dr 40 Mg PO DAILY


Zyprexa (Olanzapine) 5 Mg Tablet 5 Mg PO PRN DAILY PRN


Lisinopril 10 Mg Tablet 10 Mg PO DAILY


[lidoderm]   1 Patch TD DAILY


Levothyroxine Sodium 75 Mcg Tablet 75 Mcg PO DAILYAC


Ipratropium Bromide 0.2 Mg/1 Ml Solution 0.2 Mg IH PRN Q6HRS PRN


Hydrocodone-Apap 5-325  ** (Hydrocodone Bit/Acetaminophen) 1 Each Tablet 1 Tab 

PO PRN Q12HR PRN


Hydralazine Hcl 10 Mg Tablet 10 Mg PO TID


Lovenox (Enoxaparin Sodium) 40 Mg/0.4 Ml Disp.syrin 40 Mg SQ DAILY


Donepezil Hcl 10 Mg Tablet 10 Mg PO HS


Colace (Docusate Sodium) 100 Mg Capsule 100 Mg PO PRN DAILY PRN


Depakote Er (Divalproex Sodium) 500 Mg Tab.er.24h 750 Mg PO HS


Coreg  ** (Carvedilol) 6.25 Mg Tablet 6.25 Mg PO BIDWMEALS


Symbicort 160-4.5 Mcg Inhaler (Budesonide/Formoterol Fumarate) 10.2 Gm 

Hfa.aer.ad 2 Puff IH BID


Lipitor (Atorvastatin Calcium) 20 Mg Tablet 20 Mg PO QHS


Aspirin Ec (Aspirin) 325 Mg Tablet.dr 325 Mg PO DAILY


Amlodipine Besylate 10 Mg Tablet 10 Mg PO DAILY


Albuterol Sulfate Conc Neb Soln (Albuterol Sulfate) 2.5 Mg/0.5 Ml Vial.neb 2.5 

Mg NEB PRN Q6HRS PRN


Acetaminophen 650 Mg/20.3 Ml Solution 650 Mg PO PRN Q6HRS PRN


I have reviewed the current psychotropics carefully including drug 

interactions.  Risk benefit ratio favors no change other than as noted in my 

dictated progress note.





Diagnosis:


Problems:  


(1) Anxiety disorder


(2) Major depressive disorder, recurrent episode


(3) Psychosis, atypical


(4) Impulse control disorder











JULY ZAMUDIO MD Dec 22, 2018 22:07
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
## 2018-12-23 VITALS — DIASTOLIC BLOOD PRESSURE: 70 MMHG | SYSTOLIC BLOOD PRESSURE: 93 MMHG

## 2018-12-23 VITALS — SYSTOLIC BLOOD PRESSURE: 96 MMHG | DIASTOLIC BLOOD PRESSURE: 64 MMHG

## 2018-12-23 VITALS — DIASTOLIC BLOOD PRESSURE: 68 MMHG | SYSTOLIC BLOOD PRESSURE: 108 MMHG

## 2018-12-23 LAB
CHOLEST/HDLC SERPL: 2 {RATIO}
HDLC SERPL-MCNC: 48 MG/DL (ref 40–60)
LDLC: 66 MG/DL (ref 0–100)
T3 SERPL-MCNC: 70 NG/DL (ref 71–180)
T4 SERPL-MCNC: 5.7 UG/DL (ref 4.5–12)
THYROID STIM HORMONE (TSH): 4.51 UIU/ML (ref 0.36–3.74)
TRIGL SERPL-MCNC: 90 MG/DL (ref 0–150)
VAL ACID: 42 MCG/ML (ref 50–100)
VLDLC: 18 MG/DL (ref 0–40)

## 2018-12-23 RX ADMIN — ALBUTEROL SULFATE SCH MG: 108 AEROSOL, METERED RESPIRATORY (INHALATION) at 15:31

## 2018-12-23 RX ADMIN — CARVEDILOL SCH MG: 6.25 TABLET, FILM COATED ORAL at 17:00

## 2018-12-23 RX ADMIN — BUDESONIDE SCH MG: 0.5 SUSPENSION RESPIRATORY (INHALATION) at 20:18

## 2018-12-23 RX ADMIN — LISINOPRIL SCH MG: 10 TABLET ORAL at 07:59

## 2018-12-23 RX ADMIN — LIDOCAINE SCH PATCH: 50 PATCH CUTANEOUS at 07:58

## 2018-12-23 RX ADMIN — HYDROCODONE BITARTRATE AND ACETAMINOPHEN PRN TAB: 5; 325 TABLET ORAL at 10:50

## 2018-12-23 RX ADMIN — ASPIRIN SCH MG: 325 TABLET, DELAYED RELEASE ORAL at 08:00

## 2018-12-23 RX ADMIN — ALBUTEROL SULFATE SCH MG: 108 AEROSOL, METERED RESPIRATORY (INHALATION) at 20:18

## 2018-12-23 RX ADMIN — I-VITE, TAB 1000-60-2MG (60/BT) SCH TAB: TAB at 08:00

## 2018-12-23 RX ADMIN — SERTRALINE SCH MG: 25 TABLET, FILM COATED ORAL at 08:03

## 2018-12-23 RX ADMIN — Medication SCH EA: at 20:20

## 2018-12-23 RX ADMIN — DIVALPROEX SODIUM SCH MG: 125 CAPSULE, COATED PELLETS ORAL at 20:20

## 2018-12-23 RX ADMIN — ALBUTEROL SULFATE SCH MG: 108 AEROSOL, METERED RESPIRATORY (INHALATION) at 05:37

## 2018-12-23 RX ADMIN — ENOXAPARIN SODIUM SCH MG: 100 INJECTION SUBCUTANEOUS at 07:59

## 2018-12-23 RX ADMIN — DONEPEZIL HYDROCHLORIDE SCH MG: 10 TABLET ORAL at 20:20

## 2018-12-23 RX ADMIN — ATORVASTATIN CALCIUM SCH MG: 20 TABLET, FILM COATED ORAL at 20:19

## 2018-12-23 RX ADMIN — BUDESONIDE SCH MG: 0.5 SUSPENSION RESPIRATORY (INHALATION) at 09:52

## 2018-12-23 RX ADMIN — ALBUTEROL SULFATE SCH MG: 108 AEROSOL, METERED RESPIRATORY (INHALATION) at 09:52

## 2018-12-23 RX ADMIN — LEVOTHYROXINE SODIUM SCH MCG: 75 TABLET ORAL at 06:25

## 2018-12-23 RX ADMIN — CARVEDILOL SCH MG: 6.25 TABLET, FILM COATED ORAL at 08:00

## 2018-12-23 RX ADMIN — HYDROCODONE BITARTRATE AND ACETAMINOPHEN PRN TAB: 5; 325 TABLET ORAL at 13:09

## 2018-12-23 RX ADMIN — PANTOPRAZOLE SODIUM SCH MG: 40 TABLET, DELAYED RELEASE ORAL at 08:00

## 2018-12-23 RX ADMIN — MULTIPLE VITAMINS W/ MINERALS TAB SCH TAB: TAB at 08:00

## 2018-12-23 NOTE — PN
DATE:  12/22/2018



SUBJECTIVE:  The patient was seen on rounds the evening of 12/22/2018. 

Discussed with nursing staff, reviewed the chart.  The patient slept 7-1/4 hours

previous evening.  This morning, she was quite agitated, around breakfast time

she threw her tray on the floor.  Later, she was better, less anxious, but still

remains depressed.



REVIEW OF SYSTEMS:  Ambulation impaired.  I met with her in the room.  She is

lying in bed.  No CV, , pulmonary, eye, ENT system symptoms on review.



MENTAL STATUS EXAM:  Oriented to herself and situation.  Speech is coherent,

rapid at times.  Abstraction fair, computation impaired, language function

intact, attention span short.  Mood and affect remains anxious, labile.



LABORATORY DATA:  Reviewed.



IMPRESSION:  Major depressive disorder with psychotic features, anxiety

disorder, unspecified; cognitive disorder, unspecified.



PLAN:  Continue Depakote 750 mg p.o. at bedtime.  Valproic acid level is

awaited.  Zyprexa 5 mg daily p.r.n., trazodone 25 mg b.i.d. p.r.n., 15 mg p.r.n.

bedtime, Aricept 10 mg a day.  She did receive Zyprexa at 1400 p.r.n.  Given her

mood and anxiety symptoms, we will start Zoloft 25 mg a day, increasing in 3

days to 50 mg a day.  We will consider adding Seroquel to augment the Zoloft as

mood stabilizer depending on the valproic acid level.  We will make further

changes as clinically indicated.





______________________________

JULY ZAMUDIO MD



DR:  GINNA/[email or web address]  JOB#:  [id number] / [id number]

DD:  12/22/2018 23:04  DT:  12/23/2018 01:24

## 2018-12-23 NOTE — PDOC
Exam


Note:


Marquise Note:


Please also refer to the separate dictated note~for this date of service 

dictated separately.~Patient seen individually. Discussed the patient with 

Nursing staff reviewed the chart.~Reviewed interim history and current 

functioning. Reviewed vital signs,~Labs/ Radiology~and current medications 

noted below. Continue current treatment with the changes noted in the dictated 

addendum note





Assessment:


Vital Signs:





 Vital Signs








  Date Time  Temp Pulse Resp B/P (MAP) Pulse Ox O2 Delivery O2 Flow Rate FiO2


 


12/23/18 20:20  93  93/70    


 


12/23/18 20:19     98  2.0 


 


12/23/18 20:15      Nasal Cannula  


 


12/23/18 16:21 98.8  24     








I&O











Intake and Output 


 


 12/23/18





 07:01


 


Intake Total 480 ml


 


Balance 480 ml


 


 


 


Intake Oral 480 ml


 


# Bowel Movements 1








Labs:





 Laboratory Tests








Test


 12/23/18


08:10


 


Valproic Acid Level


 42 mcg/mL


()  L


 


Valproic Acid Last Dose Date 12/22/2018  


 


Valproic Acid Last Dose Time 0800  











Current Medications:


Meds:





Current Medications


Multi-Ingredient Ointment (Analgesic Balm) 1 louis PRN QID  PRN TP MUSCLE PAIN;  

Start 12/21/18 at 18:45


Al Hydroxide/Mg Hydroxide (Mylanta Plus Xs) 15 ml PRN AFTMEALHC  PRN PO 

DYSPEPSIA;  Start 12/21/18 at 18:45


Magnesium Hydroxide (Milk Of Magnesia) 2,400 mg PRN QHS  PRN PO CONSTIPATION;  

Start 12/21/18 at 18:45


Aspirin (Aspirin Enteric Coated) 325 mg DAILY PO  Last administered on 12/23/ 18at 08:00;  Start 12/22/18 at 09:00


Atorvastatin Calcium (Lipitor) 20 mg QHS PO  Last administered on 12/23/18at 20:

19;  Start 12/21/18 at 21:00


Estrogens Conjugated (Premarin) 0.5 louis PRN DAILY  PRN VG vaginal dryness;  

Start 12/21/18 at 19:15


Acetaminophen/ Hydrocodone Bitart (Lortab 5/325) 1 tab PRN Q12HR  PRN PO PAIN 

Last administered on 12/23/18at 13:09;  Start 12/21/18 at 19:15


Ipratropium Bromide (Atrovent) 0.2 mg PRN Q6HRS  PRN IH SHORTNESS OF BREATH;  

Start 12/21/18 at 19:15


Levothyroxine Sodium (Synthroid) 75 mcg DAILYAC PO  Last administered on 12/22/ 18at 08:40;  Start 12/22/18 at 07:30;  Stop 12/22/18 at 12:26;  Status DC


Lisinopril (Prinivil) 10 mg DAILY PO  Last administered on 12/23/18at 07:59;  

Start 12/22/18 at 09:00


Acetaminophen (Tylenol) 650 mg PRN Q6HRS  PRN PO PAIN / TEMP;  Start 12/21/18 

at 19:45


Non-Formulary Medication (Albuterol Sulfate (Albuterol Sulfate Conc Neb Soln)) 

2.5 mg PRN Q6HRS  PRN NEB SHORTNESS OF BREATH;  Start 12/21/18 at 19:15;  

Status UNV


Amlodipine Besylate (Norvasc) 10 mg DAILY PO  Last administered on 12/23/18at 07

:59;  Start 12/22/18 at 09:00


Non-Formulary Medication (Budesonide/ Formoterol Fumarate (Symbicort 160-4.5 

Mcg Inhaler)) 2 puff BID IH ;  Start 12/21/18 at 21:00;  Status UNV


Carvedilol (Coreg) 6.25 mg BIDWMEALS PO  Last administered on 12/23/18at 08:00;

  Start 12/21/18 at 20:00


Divalproex Sodium (Depakote Er) 750 mg QHS PO  Last administered on 12/21/18at 

20:53;  Start 12/21/18 at 21:00;  Stop 12/22/18 at 21:13;  Status DC


Docusate Sodium (Colace) 100 mg PRN DAILY  PRN PO HARD STOOLS;  Start 12/21/18 

at 19:45


Donepezil HCl (Aricept) 10 mg QHS PO  Last administered on 12/23/18at 20:20;  

Start 12/21/18 at 21:00


Enoxaparin Sodium (Lovenox 40mg Syringe) 40 mg DAILY SQ  Last administered on 12 /23/18at 07:59;  Start 12/22/18 at 09:00


Hydralazine HCl (Apresoline) 10 mg TID PO  Last administered on 12/23/18at 14:16

;  Start 12/21/18 at 21:00


Multivitamins/ Calcium (Thera-M Plus) 1 tab DAILY PO  Last administered on 12/23 /18at 08:00;  Start 12/22/18 at 09:00


Olanzapine (ZyPREXA) 5 mg PRN DAILY  PRN PO ANXIETY/AGITATION Last administered 

on 12/23/18at 10:50;  Start 12/22/18 at 09:00


Pantoprazole Sodium (Protonix) 40 mg DAILYAC PO  Last administered on 12/23/ 18at 08:00;  Start 12/22/18 at 07:30


Phenazopyridine HCl (Pyridium) 100 mg PRN TID  PRN PO URINARY PAIN;  Start 12/21 /18 at 19:45


Polyethylene Glycol (miraLAX) 17 gm PRN BID  PRN PO CONSTIPATION;  Start 12/22/ 18 at 09:00


Sodium Chloride (Saline Mist Nasal) 1 louis QID NS ;  Start 12/21/18 at 21:00;  

Status Cancel


Trazodone HCl (Desyrel) 25 mg PRN BID  PRN PO ANXIETY/AGITATION;  Start 12/21/ 18 at 19:45


Trazodone HCl (Desyrel) 50 mg PRN QHS  PRN PO INSOMNIA;  Start 12/21/18 at 19:45


Lidocaine (Lidoderm) 1 patch DAILY TD  Last administered on 12/23/18at 07:58;  

Start 12/22/18 at 09:00


Multivitamins/ Minerals (I-Irina) 1 tab DAILY PO  Last administered on 12/23/ 18at 08:00;  Start 12/22/18 at 09:00


Miscellaneous (Lidoderm Patch Removal) 1 ea QHS MC  Last administered on 12/23/ 18at 20:20;  Start 12/21/18 at 21:00


Albuterol Sulfate (Ventolin) 2.5 mg PRN Q6HRS  PRN NEB SHORTNESS OF BREATH;  

Start 12/21/18 at 19:45


Albuterol Sulfate (Ventolin) 2.5 mg RTQID NEB  Last administered on 12/23/18at 

20:18;  Start 12/21/18 at 20:00


Budesonide (Pulmicort) 0.5 mg RTBID NEB  Last administered on 12/23/18at 20:18;

  Start 12/21/18 at 20:00


Sodium Chloride (Ayr Saline Nasal) 2 louis QID NS ;  Start 12/21/18 at 21:00;  

Stop 12/22/18 at 13:08;  Status DC


Levothyroxine Sodium (Synthroid) 75 mcg DAILY06 PO  Last administered on 12/23/ 18at 06:25;  Start 12/23/18 at 06:00


Sodium Chloride (Saline Mist Nasal) 1 louis PRN QID  PRN NS NASAL CONGESTION;  

Start 12/22/18 at 13:15


Divalproex Sodium (Depakote Sprinkles) 750 mg HS PO  Last administered on 12/23/ 18at 20:20;  Start 12/22/18 at 21:30


Sertraline HCl (Zoloft) 25 mg DAILY PO  Last administered on 12/23/18at 08:03;  

Start 12/23/18 at 09:00;  Stop 12/25/18 at 09:01


Sertraline HCl (Zoloft) 50 mg DAILY PO ;  Start 12/26/18 at 09:00


Divalproex Sodium (Depakote Sprinkles) 500 mg BID94 PO ;  Start 12/24/18 at 09:

00


Quetiapine Fumarate (SEROquel) 12.5 mg 1300 PO ;  Start 12/24/18 at 13:00





Active Scripts


Active


Reported


[occuvite]   1 Tab PO DAILY


Pyridium (Phenazopyridine Hcl) 100 Mg Tablet 100 Mg PO PRN TID PRN


Trazodone Hcl 50 Mg Tablet 25 Mg PO PRN QHS PRN


Premarin (Estrogens, Conjugated) 30 Gm Cream.appl 0.5 Gm VG PRN DAILY PRN


Multivitamins (Multivitamin) 1 Each Tablet 1 Tab PO DAILY


Trazodone Hcl 50 Mg Tablet 25 Mg PO BID PRN


Ayr Saline (Sodium Chloride) 50 Ml Drops 2 Drop NS QID


Miralax (Polyethylene Glycol 3350) 17 Gm Powd.pack 17 Gm PO PRN BID PRN


Protonix (Pantoprazole Sodium) 40 Mg Tablet.dr 40 Mg PO DAILY


Zyprexa (Olanzapine) 5 Mg Tablet 5 Mg PO PRN DAILY PRN


Lisinopril 10 Mg Tablet 10 Mg PO DAILY


[lidoderm]   1 Patch TD DAILY


Levothyroxine Sodium 75 Mcg Tablet 75 Mcg PO DAILYAC


Ipratropium Bromide 0.2 Mg/1 Ml Solution 0.2 Mg IH PRN Q6HRS PRN


Hydrocodone-Apap 5-325  ** (Hydrocodone Bit/Acetaminophen) 1 Each Tablet 1 Tab 

PO PRN Q12HR PRN


Hydralazine Hcl 10 Mg Tablet 10 Mg PO TID


Lovenox (Enoxaparin Sodium) 40 Mg/0.4 Ml Disp.syrin 40 Mg SQ DAILY


Donepezil Hcl 10 Mg Tablet 10 Mg PO HS


Colace (Docusate Sodium) 100 Mg Capsule 100 Mg PO PRN DAILY PRN


Depakote Er (Divalproex Sodium) 500 Mg Tab.er.24h 750 Mg PO HS


Coreg  ** (Carvedilol) 6.25 Mg Tablet 6.25 Mg PO BIDWMEALS


Symbicort 160-4.5 Mcg Inhaler (Budesonide/Formoterol Fumarate) 10.2 Gm 

Hfa.aer.ad 2 Puff IH BID


Lipitor (Atorvastatin Calcium) 20 Mg Tablet 20 Mg PO QHS


Aspirin Ec (Aspirin) 325 Mg Tablet.dr 325 Mg PO DAILY


Amlodipine Besylate 10 Mg Tablet 10 Mg PO DAILY


Albuterol Sulfate Conc Neb Soln (Albuterol Sulfate) 2.5 Mg/0.5 Ml Vial.neb 2.5 

Mg NEB PRN Q6HRS PRN


Acetaminophen 650 Mg/20.3 Ml Solution 650 Mg PO PRN Q6HRS PRN


I have reviewed the current psychotropics carefully including drug 

interactions.  Risk benefit ratio favors no change other than as noted in my 

dictated progress note.





Diagnosis:


Problems:  


(1) Anxiety disorder


(2) Major depressive disorder, recurrent episode


(3) Psychosis, atypical


(4) Impulse control disorder











JULY ZAMUDIO MD Dec 23, 2018 22:53

## 2018-12-24 VITALS — DIASTOLIC BLOOD PRESSURE: 60 MMHG | SYSTOLIC BLOOD PRESSURE: 104 MMHG

## 2018-12-24 VITALS — SYSTOLIC BLOOD PRESSURE: 133 MMHG | DIASTOLIC BLOOD PRESSURE: 83 MMHG

## 2018-12-24 LAB — HBA1C MFR BLD: 4.9 % (ref 4.8–5.6)

## 2018-12-24 RX ADMIN — DIVALPROEX SODIUM SCH MG: 125 CAPSULE, COATED PELLETS ORAL at 20:15

## 2018-12-24 RX ADMIN — LEVOTHYROXINE SODIUM SCH MCG: 75 TABLET ORAL at 04:55

## 2018-12-24 RX ADMIN — ALBUTEROL SULFATE SCH MG: 108 AEROSOL, METERED RESPIRATORY (INHALATION) at 11:37

## 2018-12-24 RX ADMIN — DONEPEZIL HYDROCHLORIDE SCH MG: 10 TABLET ORAL at 20:15

## 2018-12-24 RX ADMIN — BUDESONIDE SCH MG: 0.5 SUSPENSION RESPIRATORY (INHALATION) at 11:38

## 2018-12-24 RX ADMIN — LISINOPRIL SCH MG: 10 TABLET ORAL at 07:52

## 2018-12-24 RX ADMIN — CARVEDILOL SCH MG: 6.25 TABLET, FILM COATED ORAL at 16:59

## 2018-12-24 RX ADMIN — HYDROCODONE BITARTRATE AND ACETAMINOPHEN PRN TAB: 5; 325 TABLET ORAL at 09:06

## 2018-12-24 RX ADMIN — I-VITE, TAB 1000-60-2MG (60/BT) SCH TAB: TAB at 07:50

## 2018-12-24 RX ADMIN — QUETIAPINE FUMARATE SCH MG: 25 TABLET, FILM COATED ORAL at 13:04

## 2018-12-24 RX ADMIN — ALBUTEROL SULFATE SCH MG: 108 AEROSOL, METERED RESPIRATORY (INHALATION) at 20:33

## 2018-12-24 RX ADMIN — MULTIPLE VITAMINS W/ MINERALS TAB SCH TAB: TAB at 07:52

## 2018-12-24 RX ADMIN — CARVEDILOL SCH MG: 6.25 TABLET, FILM COATED ORAL at 07:50

## 2018-12-24 RX ADMIN — ENOXAPARIN SODIUM SCH MG: 100 INJECTION SUBCUTANEOUS at 07:53

## 2018-12-24 RX ADMIN — ALBUTEROL SULFATE SCH MG: 108 AEROSOL, METERED RESPIRATORY (INHALATION) at 16:17

## 2018-12-24 RX ADMIN — ATORVASTATIN CALCIUM SCH MG: 20 TABLET, FILM COATED ORAL at 20:15

## 2018-12-24 RX ADMIN — BUDESONIDE SCH MG: 0.5 SUSPENSION RESPIRATORY (INHALATION) at 20:33

## 2018-12-24 RX ADMIN — ASPIRIN SCH MG: 325 TABLET, DELAYED RELEASE ORAL at 07:52

## 2018-12-24 RX ADMIN — ALBUTEROL SULFATE SCH MG: 108 AEROSOL, METERED RESPIRATORY (INHALATION) at 06:12

## 2018-12-24 RX ADMIN — HYDROCODONE BITARTRATE AND ACETAMINOPHEN PRN TAB: 5; 325 TABLET ORAL at 20:16

## 2018-12-24 RX ADMIN — PANTOPRAZOLE SODIUM SCH MG: 40 TABLET, DELAYED RELEASE ORAL at 07:52

## 2018-12-24 RX ADMIN — SERTRALINE SCH MG: 25 TABLET, FILM COATED ORAL at 07:51

## 2018-12-24 RX ADMIN — LIDOCAINE SCH PATCH: 50 PATCH CUTANEOUS at 07:53

## 2018-12-24 RX ADMIN — Medication SCH EA: at 20:11

## 2018-12-24 NOTE — PDOC
Exam


Note:


Marquise Note:


Please also refer to the separate dictated note~for this date of service 

dictated separately.~Patient seen individually. Discussed the patient with 

Nursing staff reviewed the chart.~Reviewed interim history and current 

functioning. Reviewed vital signs,~Labs/ Radiology~and current medications 

noted below. Continue current treatment with the changes noted in the dictated 

addendum note





Assessment:


Vital Signs:





 Vital Signs








  Date Time  Temp Pulse Resp B/P (MAP) Pulse Ox O2 Delivery O2 Flow Rate FiO2


 


12/24/18 16:59  76  104/60    


 


12/24/18 16:21     98 Nasal Cannula 2.0 


 


12/24/18 16:14 97.6  20     








I&O











Intake and Output 


 


 12/24/18





 07:01


 


Intake Total 240 ml


 


Balance 240 ml


 


 


 


Intake Oral 240 ml











Current Medications:


Meds:





Current Medications


Multi-Ingredient Ointment (Analgesic Balm) 1 louis PRN QID  PRN TP MUSCLE PAIN;  

Start 12/21/18 at 18:45


Al Hydroxide/Mg Hydroxide (Mylanta Plus Xs) 15 ml PRN AFTMEALHC  PRN PO 

DYSPEPSIA;  Start 12/21/18 at 18:45


Magnesium Hydroxide (Milk Of Magnesia) 2,400 mg PRN QHS  PRN PO CONSTIPATION;  

Start 12/21/18 at 18:45


Aspirin (Aspirin Enteric Coated) 325 mg DAILY PO  Last administered on 12/24/ 18at 07:52;  Start 12/22/18 at 09:00


Atorvastatin Calcium (Lipitor) 20 mg QHS PO  Last administered on 12/23/18at 20:

19;  Start 12/21/18 at 21:00


Estrogens Conjugated (Premarin) 0.5 louis PRN DAILY  PRN VG vaginal dryness;  

Start 12/21/18 at 19:15


Acetaminophen/ Hydrocodone Bitart (Lortab 5/325) 1 tab PRN Q12HR  PRN PO PAIN 

Last administered on 12/24/18at 09:06;  Start 12/21/18 at 19:15;  Stop 12/24/18 

at 15:02;  Status DC


Ipratropium Bromide (Atrovent) 0.2 mg PRN Q6HRS  PRN IH SHORTNESS OF BREATH;  

Start 12/21/18 at 19:15


Levothyroxine Sodium (Synthroid) 75 mcg DAILYAC PO  Last administered on 12/22/ 18at 08:40;  Start 12/22/18 at 07:30;  Stop 12/22/18 at 12:26;  Status DC


Lisinopril (Prinivil) 10 mg DAILY PO  Last administered on 12/24/18at 07:52;  

Start 12/22/18 at 09:00


Acetaminophen (Tylenol) 650 mg PRN Q6HRS  PRN PO PAIN / TEMP;  Start 12/21/18 

at 19:45


Non-Formulary Medication (Albuterol Sulfate (Albuterol Sulfate Conc Neb Soln)) 

2.5 mg PRN Q6HRS  PRN NEB SHORTNESS OF BREATH;  Start 12/21/18 at 19:15;  

Status UNV


Amlodipine Besylate (Norvasc) 10 mg DAILY PO  Last administered on 12/24/18at 07

:49;  Start 12/22/18 at 09:00


Non-Formulary Medication (Budesonide/ Formoterol Fumarate (Symbicort 160-4.5 

Mcg Inhaler)) 2 puff BID IH ;  Start 12/21/18 at 21:00;  Status UNV


Carvedilol (Coreg) 6.25 mg BIDWMEALS PO  Last administered on 12/24/18at 16:59;

  Start 12/21/18 at 20:00


Divalproex Sodium (Depakote Er) 750 mg QHS PO  Last administered on 12/21/18at 

20:53;  Start 12/21/18 at 21:00;  Stop 12/22/18 at 21:13;  Status DC


Docusate Sodium (Colace) 100 mg PRN DAILY  PRN PO HARD STOOLS;  Start 12/21/18 

at 19:45


Donepezil HCl (Aricept) 10 mg QHS PO  Last administered on 12/23/18at 20:20;  

Start 12/21/18 at 21:00


Enoxaparin Sodium (Lovenox 40mg Syringe) 40 mg DAILY SQ  Last administered on 12 /24/18at 07:53;  Start 12/22/18 at 09:00


Hydralazine HCl (Apresoline) 10 mg TID PO  Last administered on 12/24/18at 07:50

;  Start 12/21/18 at 21:00


Multivitamins/ Calcium (Thera-M Plus) 1 tab DAILY PO  Last administered on 12/24 /18at 07:52;  Start 12/22/18 at 09:00


Olanzapine (ZyPREXA) 5 mg PRN DAILY  PRN PO ANXIETY/AGITATION Last administered 

on 12/23/18at 10:50;  Start 12/22/18 at 09:00


Pantoprazole Sodium (Protonix) 40 mg DAILYAC PO  Last administered on 12/24/ 18at 07:52;  Start 12/22/18 at 07:30


Phenazopyridine HCl (Pyridium) 100 mg PRN TID  PRN PO URINARY PAIN;  Start 12/21 /18 at 19:45


Polyethylene Glycol (miraLAX) 17 gm PRN BID  PRN PO CONSTIPATION;  Start 12/22/ 18 at 09:00


Sodium Chloride (Saline Mist Nasal) 1 louis QID NS ;  Start 12/21/18 at 21:00;  

Status Cancel


Trazodone HCl (Desyrel) 25 mg PRN BID  PRN PO ANXIETY/AGITATION;  Start 12/21/ 18 at 19:45


Trazodone HCl (Desyrel) 50 mg PRN QHS  PRN PO INSOMNIA;  Start 12/21/18 at 19:45


Lidocaine (Lidoderm) 1 patch DAILY TD  Last administered on 12/24/18at 07:53;  

Start 12/22/18 at 09:00


Multivitamins/ Minerals (I-Irina) 1 tab DAILY PO  Last administered on 12/24/ 18at 07:50;  Start 12/22/18 at 09:00


Miscellaneous (Lidoderm Patch Removal) 1 ea QHS MC  Last administered on 12/23/ 18at 20:20;  Start 12/21/18 at 21:00


Albuterol Sulfate (Ventolin) 2.5 mg PRN Q6HRS  PRN NEB SHORTNESS OF BREATH;  

Start 12/21/18 at 19:45


Albuterol Sulfate (Ventolin) 2.5 mg RTQID NEB  Last administered on 12/24/18at 

16:17;  Start 12/21/18 at 20:00


Budesonide (Pulmicort) 0.5 mg RTBID NEB  Last administered on 12/24/18at 11:38;

  Start 12/21/18 at 20:00


Sodium Chloride (Ayr Saline Nasal) 2 louis QID NS ;  Start 12/21/18 at 21:00;  

Stop 12/22/18 at 13:08;  Status DC


Levothyroxine Sodium (Synthroid) 75 mcg DAILY06 PO  Last administered on 12/24/ 18at 04:55;  Start 12/23/18 at 06:00


Sodium Chloride (Saline Mist Nasal) 1 louis PRN QID  PRN NS NASAL CONGESTION;  

Start 12/22/18 at 13:15


Divalproex Sodium (Depakote Sprinkles) 750 mg HS PO  Last administered on 12/23/ 18at 20:20;  Start 12/22/18 at 21:30;  Stop 12/24/18 at 14:27;  Status DC


Sertraline HCl (Zoloft) 25 mg DAILY PO  Last administered on 12/24/18at 07:51;  

Start 12/23/18 at 09:00;  Stop 12/25/18 at 09:01


Sertraline HCl (Zoloft) 50 mg DAILY PO ;  Start 12/26/18 at 09:00


Divalproex Sodium (Depakote Sprinkles) 500 mg BID94 PO  Last administered on 12/ 24/18at 07:55;  Start 12/24/18 at 09:00;  Stop 12/24/18 at 14:27;  Status DC


Quetiapine Fumarate (SEROquel) 12.5 mg 1300 PO  Last administered on 12/24/18at 

13:04;  Start 12/24/18 at 13:00


Divalproex Sodium (Depakote Sprinkles) 500 mg BID PO ;  Start 12/24/18 at 21:00


Acetaminophen/ Hydrocodone Bitart (Lortab 5/325) 1 tab PRN Q6HRS  PRN PO PAIN;  

Start 12/24/18 at 15:15





Active Scripts


Active


Reported


[occuvite]   1 Tab PO DAILY


Pyridium (Phenazopyridine Hcl) 100 Mg Tablet 100 Mg PO PRN TID PRN


Trazodone Hcl 50 Mg Tablet 25 Mg PO PRN QHS PRN


Premarin (Estrogens, Conjugated) 30 Gm Cream.appl 0.5 Gm VG PRN DAILY PRN


Multivitamins (Multivitamin) 1 Each Tablet 1 Tab PO DAILY


Trazodone Hcl 50 Mg Tablet 25 Mg PO BID PRN


Ayr Saline (Sodium Chloride) 50 Ml Drops 2 Drop NS QID


Miralax (Polyethylene Glycol 3350) 17 Gm Powd.pack 17 Gm PO PRN BID PRN


Protonix (Pantoprazole Sodium) 40 Mg Tablet.dr 40 Mg PO DAILY


Zyprexa (Olanzapine) 5 Mg Tablet 5 Mg PO PRN DAILY PRN


Lisinopril 10 Mg Tablet 10 Mg PO DAILY


[lidoderm]   1 Patch TD DAILY


Levothyroxine Sodium 75 Mcg Tablet 75 Mcg PO DAILYAC


Ipratropium Bromide 0.2 Mg/1 Ml Solution 0.2 Mg IH PRN Q6HRS PRN


Hydrocodone-Apap 5-325  ** (Hydrocodone Bit/Acetaminophen) 1 Each Tablet 1 Tab 

PO PRN Q12HR PRN


Hydralazine Hcl 10 Mg Tablet 10 Mg PO TID


Lovenox (Enoxaparin Sodium) 40 Mg/0.4 Ml Disp.syrin 40 Mg SQ DAILY


Donepezil Hcl 10 Mg Tablet 10 Mg PO HS


Colace (Docusate Sodium) 100 Mg Capsule 100 Mg PO PRN DAILY PRN


Depakote Er (Divalproex Sodium) 500 Mg Tab.er.24h 750 Mg PO HS


Coreg  ** (Carvedilol) 6.25 Mg Tablet 6.25 Mg PO BIDWMEALS


Symbicort 160-4.5 Mcg Inhaler (Budesonide/Formoterol Fumarate) 10.2 Gm 

Hfa.aer.ad 2 Puff IH BID


Lipitor (Atorvastatin Calcium) 20 Mg Tablet 20 Mg PO QHS


Aspirin Ec (Aspirin) 325 Mg Tablet.dr 325 Mg PO DAILY


Amlodipine Besylate 10 Mg Tablet 10 Mg PO DAILY


Albuterol Sulfate Conc Neb Soln (Albuterol Sulfate) 2.5 Mg/0.5 Ml Vial.neb 2.5 

Mg NEB PRN Q6HRS PRN


Acetaminophen 650 Mg/20.3 Ml Solution 650 Mg PO PRN Q6HRS PRN


I have reviewed the current psychotropics carefully including drug 

interactions.  Risk benefit ratio favors no change other than as noted in my 

dictated progress note.





Diagnosis:


Problems:  


(1) Anxiety disorder


(2) Major depressive disorder, recurrent episode


(3) Psychosis, atypical


(4) Impulse control disorder











JULY ZAMUDIO MD Dec 24, 2018 18:55

## 2018-12-25 VITALS — DIASTOLIC BLOOD PRESSURE: 60 MMHG | SYSTOLIC BLOOD PRESSURE: 100 MMHG

## 2018-12-25 VITALS — DIASTOLIC BLOOD PRESSURE: 88 MMHG | SYSTOLIC BLOOD PRESSURE: 128 MMHG

## 2018-12-25 RX ADMIN — BUDESONIDE SCH MG: 0.5 SUSPENSION RESPIRATORY (INHALATION) at 10:08

## 2018-12-25 RX ADMIN — HYDROCODONE BITARTRATE AND ACETAMINOPHEN PRN TAB: 5; 325 TABLET ORAL at 18:36

## 2018-12-25 RX ADMIN — DIVALPROEX SODIUM SCH MG: 125 CAPSULE, COATED PELLETS ORAL at 08:38

## 2018-12-25 RX ADMIN — BUDESONIDE SCH MG: 0.5 SUSPENSION RESPIRATORY (INHALATION) at 20:32

## 2018-12-25 RX ADMIN — LIDOCAINE SCH PATCH: 50 PATCH CUTANEOUS at 08:40

## 2018-12-25 RX ADMIN — PANTOPRAZOLE SODIUM SCH MG: 40 TABLET, DELAYED RELEASE ORAL at 08:37

## 2018-12-25 RX ADMIN — DIVALPROEX SODIUM SCH MG: 125 CAPSULE, COATED PELLETS ORAL at 19:12

## 2018-12-25 RX ADMIN — Medication SCH EA: at 20:32

## 2018-12-25 RX ADMIN — LEVOTHYROXINE SODIUM SCH MCG: 75 TABLET ORAL at 05:38

## 2018-12-25 RX ADMIN — SERTRALINE SCH MG: 25 TABLET, FILM COATED ORAL at 08:39

## 2018-12-25 RX ADMIN — ASPIRIN SCH MG: 325 TABLET, DELAYED RELEASE ORAL at 08:39

## 2018-12-25 RX ADMIN — ALBUTEROL SULFATE SCH MG: 108 AEROSOL, METERED RESPIRATORY (INHALATION) at 10:08

## 2018-12-25 RX ADMIN — ALBUTEROL SULFATE SCH MG: 108 AEROSOL, METERED RESPIRATORY (INHALATION) at 05:10

## 2018-12-25 RX ADMIN — MULTIPLE VITAMINS W/ MINERALS TAB SCH TAB: TAB at 08:38

## 2018-12-25 RX ADMIN — DONEPEZIL HYDROCHLORIDE SCH MG: 10 TABLET ORAL at 19:11

## 2018-12-25 RX ADMIN — I-VITE, TAB 1000-60-2MG (60/BT) SCH TAB: TAB at 08:39

## 2018-12-25 RX ADMIN — ATORVASTATIN CALCIUM SCH MG: 20 TABLET, FILM COATED ORAL at 19:11

## 2018-12-25 RX ADMIN — ALBUTEROL SULFATE SCH MG: 108 AEROSOL, METERED RESPIRATORY (INHALATION) at 15:51

## 2018-12-25 RX ADMIN — ALBUTEROL SULFATE SCH MG: 108 AEROSOL, METERED RESPIRATORY (INHALATION) at 20:32

## 2018-12-25 RX ADMIN — QUETIAPINE FUMARATE SCH MG: 25 TABLET, FILM COATED ORAL at 13:17

## 2018-12-25 RX ADMIN — CARVEDILOL SCH MG: 6.25 TABLET, FILM COATED ORAL at 08:40

## 2018-12-25 RX ADMIN — LISINOPRIL SCH MG: 10 TABLET ORAL at 08:39

## 2018-12-25 RX ADMIN — CARVEDILOL SCH MG: 6.25 TABLET, FILM COATED ORAL at 17:18

## 2018-12-25 RX ADMIN — ENOXAPARIN SODIUM SCH MG: 100 INJECTION SUBCUTANEOUS at 08:41

## 2018-12-25 NOTE — PDOC
Exam


Note:


Marquise Note:


Please also refer to the separate dictated note~for this date of service 

dictated separately.~Patient seen individually. Discussed the patient with 

Nursing staff reviewed the chart.~Reviewed interim history and current 

functioning. Reviewed vital signs,~Labs/ Radiology~and current medications 

noted below. Continue current treatment with the changes noted in the dictated 

addendum note





Assessment:


Vital Signs:





 Vital Signs








  Date Time  Temp Pulse Resp B/P (MAP) Pulse Ox O2 Delivery O2 Flow Rate FiO2


 


12/25/18 17:18  91  128/88    


 


12/25/18 16:51 98.0  20  97   


 


12/25/18 15:51      Nasal Cannula 2.0 








I&O











Intake and Output 


 


 12/25/18





 07:01


 


Intake Total 960 ml


 


Balance 960 ml


 


 


 


Intake Oral 960 ml


 


# Bowel Movements 6











Current Medications:


Meds:





Current Medications


Multi-Ingredient Ointment (Analgesic Balm) 1 louis PRN QID  PRN TP MUSCLE PAIN;  

Start 12/21/18 at 18:45


Al Hydroxide/Mg Hydroxide (Mylanta Plus Xs) 15 ml PRN AFTMEALHC  PRN PO 

DYSPEPSIA;  Start 12/21/18 at 18:45


Magnesium Hydroxide (Milk Of Magnesia) 2,400 mg PRN QHS  PRN PO CONSTIPATION;  

Start 12/21/18 at 18:45


Aspirin (Aspirin Enteric Coated) 325 mg DAILY PO  Last administered on 12/25/ 18at 08:39;  Start 12/22/18 at 09:00


Atorvastatin Calcium (Lipitor) 20 mg QHS PO  Last administered on 12/24/18at 20:

15;  Start 12/21/18 at 21:00


Estrogens Conjugated (Premarin) 0.5 louis PRN DAILY  PRN VG vaginal dryness;  

Start 12/21/18 at 19:15


Acetaminophen/ Hydrocodone Bitart (Lortab 5/325) 1 tab PRN Q12HR  PRN PO PAIN 

Last administered on 12/24/18at 09:06;  Start 12/21/18 at 19:15;  Stop 12/24/18 

at 15:02;  Status DC


Ipratropium Bromide (Atrovent) 0.2 mg PRN Q6HRS  PRN IH SHORTNESS OF BREATH;  

Start 12/21/18 at 19:15


Levothyroxine Sodium (Synthroid) 75 mcg DAILYAC PO  Last administered on 12/22/ 18at 08:40;  Start 12/22/18 at 07:30;  Stop 12/22/18 at 12:26;  Status DC


Lisinopril (Prinivil) 10 mg DAILY PO  Last administered on 12/25/18at 08:39;  

Start 12/22/18 at 09:00


Acetaminophen (Tylenol) 650 mg PRN Q6HRS  PRN PO PAIN / TEMP;  Start 12/21/18 

at 19:45


Non-Formulary Medication (Albuterol Sulfate (Albuterol Sulfate Conc Neb Soln)) 

2.5 mg PRN Q6HRS  PRN NEB SHORTNESS OF BREATH;  Start 12/21/18 at 19:15;  

Status UNV


Amlodipine Besylate (Norvasc) 10 mg DAILY PO  Last administered on 12/25/18at 08

:38;  Start 12/22/18 at 09:00;  Stop 12/25/18 at 12:27;  Status DC


Non-Formulary Medication (Budesonide/ Formoterol Fumarate (Symbicort 160-4.5 

Mcg Inhaler)) 2 puff BID IH ;  Start 12/21/18 at 21:00;  Status UNV


Carvedilol (Coreg) 6.25 mg BIDWMEALS PO  Last administered on 12/25/18at 17:18;

  Start 12/21/18 at 20:00


Divalproex Sodium (Depakote Er) 750 mg QHS PO  Last administered on 12/21/18at 

20:53;  Start 12/21/18 at 21:00;  Stop 12/22/18 at 21:13;  Status DC


Docusate Sodium (Colace) 100 mg PRN DAILY  PRN PO HARD STOOLS;  Start 12/21/18 

at 19:45


Donepezil HCl (Aricept) 10 mg QHS PO  Last administered on 12/24/18at 20:15;  

Start 12/21/18 at 21:00


Enoxaparin Sodium (Lovenox 40mg Syringe) 40 mg DAILY SQ  Last administered on 12 /25/18at 08:41;  Start 12/22/18 at 09:00


Hydralazine HCl (Apresoline) 10 mg TID PO  Last administered on 12/25/18at 08:39

;  Start 12/21/18 at 21:00;  Stop 12/25/18 at 12:27;  Status DC


Multivitamins/ Calcium (Thera-M Plus) 1 tab DAILY PO  Last administered on 12/25 /18at 08:38;  Start 12/22/18 at 09:00


Olanzapine (ZyPREXA) 5 mg PRN DAILY  PRN PO ANXIETY/AGITATION Last administered 

on 12/23/18at 10:50;  Start 12/22/18 at 09:00


Pantoprazole Sodium (Protonix) 40 mg DAILYAC PO  Last administered on 12/25/ 18at 08:37;  Start 12/22/18 at 07:30


Phenazopyridine HCl (Pyridium) 100 mg PRN TID  PRN PO URINARY PAIN;  Start 12/21 /18 at 19:45


Polyethylene Glycol (miraLAX) 17 gm PRN BID  PRN PO CONSTIPATION;  Start 12/22/ 18 at 09:00


Sodium Chloride (Saline Mist Nasal) 1 louis QID NS ;  Start 12/21/18 at 21:00;  

Status Cancel


Trazodone HCl (Desyrel) 25 mg PRN BID  PRN PO ANXIETY/AGITATION;  Start 12/21/ 18 at 19:45


Trazodone HCl (Desyrel) 50 mg PRN QHS  PRN PO INSOMNIA;  Start 12/21/18 at 19:45


Lidocaine (Lidoderm) 1 patch DAILY TD  Last administered on 12/25/18at 08:40;  

Start 12/22/18 at 09:00


Multivitamins/ Minerals (I-Irina) 1 tab DAILY PO  Last administered on 12/25/ 18at 08:39;  Start 12/22/18 at 09:00


Miscellaneous (Lidoderm Patch Removal) 1 ea QHS MC  Last administered on 12/24/ 18at 20:11;  Start 12/21/18 at 21:00


Albuterol Sulfate (Ventolin) 2.5 mg PRN Q6HRS  PRN NEB SHORTNESS OF BREATH;  

Start 12/21/18 at 19:45


Albuterol Sulfate (Ventolin) 2.5 mg RTQID NEB  Last administered on 12/25/18at 

15:51;  Start 12/21/18 at 20:00


Budesonide (Pulmicort) 0.5 mg RTBID NEB  Last administered on 12/25/18at 10:08;

  Start 12/21/18 at 20:00


Sodium Chloride (Ayr Saline Nasal) 2 louis QID NS ;  Start 12/21/18 at 21:00;  

Stop 12/22/18 at 13:08;  Status DC


Levothyroxine Sodium (Synthroid) 75 mcg DAILY06 PO  Last administered on 12/25/ 18at 05:38;  Start 12/23/18 at 06:00


Sodium Chloride (Saline Mist Nasal) 1 louis PRN QID  PRN NS NASAL CONGESTION;  

Start 12/22/18 at 13:15


Divalproex Sodium (Depakote Sprinkles) 750 mg HS PO  Last administered on 12/23/ 18at 20:20;  Start 12/22/18 at 21:30;  Stop 12/24/18 at 14:27;  Status DC


Sertraline HCl (Zoloft) 25 mg DAILY PO  Last administered on 12/25/18at 08:39;  

Start 12/23/18 at 09:00;  Stop 12/25/18 at 09:01;  Status DC


Sertraline HCl (Zoloft) 50 mg DAILY PO ;  Start 12/26/18 at 09:00


Divalproex Sodium (Depakote Sprinkles) 500 mg BID94 PO  Last administered on 12/ 24/18at 07:55;  Start 12/24/18 at 09:00;  Stop 12/24/18 at 14:27;  Status DC


Quetiapine Fumarate (SEROquel) 12.5 mg 1300 PO  Last administered on 12/25/18at 

13:17;  Start 12/24/18 at 13:00


Divalproex Sodium (Depakote Sprinkles) 500 mg BID PO  Last administered on 12/25 /18at 08:38;  Start 12/24/18 at 21:00


Acetaminophen/ Hydrocodone Bitart (Lortab 5/325) 1 tab PRN Q6HRS  PRN PO PAIN 

Last administered on 12/25/18at 18:36;  Start 12/24/18 at 15:15


Olanzapine (ZyPREXA ZYDIS) 2.5 mg PRN Q2HR  PRN PO PSYCHOSIS;  Start 12/24/18 

at 19:15





Active Scripts


Active


Reported


[occuvite]   1 Tab PO DAILY


Pyridium (Phenazopyridine Hcl) 100 Mg Tablet 100 Mg PO PRN TID PRN


Trazodone Hcl 50 Mg Tablet 25 Mg PO PRN QHS PRN


Premarin (Estrogens, Conjugated) 30 Gm Cream.appl 0.5 Gm VG PRN DAILY PRN


Multivitamins (Multivitamin) 1 Each Tablet 1 Tab PO DAILY


Trazodone Hcl 50 Mg Tablet 25 Mg PO BID PRN


Ayr Saline (Sodium Chloride) 50 Ml Drops 2 Drop NS QID


Miralax (Polyethylene Glycol 3350) 17 Gm Powd.pack 17 Gm PO PRN BID PRN


Protonix (Pantoprazole Sodium) 40 Mg Tablet.dr 40 Mg PO DAILY


Zyprexa (Olanzapine) 5 Mg Tablet 5 Mg PO PRN DAILY PRN


Lisinopril 10 Mg Tablet 10 Mg PO DAILY


[lidoderm]   1 Patch TD DAILY


Levothyroxine Sodium 75 Mcg Tablet 75 Mcg PO DAILYAC


Ipratropium Bromide 0.2 Mg/1 Ml Solution 0.2 Mg IH PRN Q6HRS PRN


Hydrocodone-Apap 5-325  ** (Hydrocodone Bit/Acetaminophen) 1 Each Tablet 1 Tab 

PO PRN Q12HR PRN


Hydralazine Hcl 10 Mg Tablet 10 Mg PO TID


Lovenox (Enoxaparin Sodium) 40 Mg/0.4 Ml Disp.syrin 40 Mg SQ DAILY


Donepezil Hcl 10 Mg Tablet 10 Mg PO HS


Colace (Docusate Sodium) 100 Mg Capsule 100 Mg PO PRN DAILY PRN


Depakote Er (Divalproex Sodium) 500 Mg Tab.er.24h 750 Mg PO HS


Coreg  ** (Carvedilol) 6.25 Mg Tablet 6.25 Mg PO BIDWMEALS


Symbicort 160-4.5 Mcg Inhaler (Budesonide/Formoterol Fumarate) 10.2 Gm 

Hfa.aer.ad 2 Puff IH BID


Lipitor (Atorvastatin Calcium) 20 Mg Tablet 20 Mg PO QHS


Aspirin Ec (Aspirin) 325 Mg Tablet. 325 Mg PO DAILY


Amlodipine Besylate 10 Mg Tablet 10 Mg PO DAILY


Albuterol Sulfate Conc Neb Soln (Albuterol Sulfate) 2.5 Mg/0.5 Ml Vial.neb 2.5 

Mg NEB PRN Q6HRS PRN


Acetaminophen 650 Mg/20.3 Ml Solution 650 Mg PO PRN Q6HRS PRN


I have reviewed the current psychotropics carefully including drug 

interactions.  Risk benefit ratio favors no change other than as noted in my 

dictated progress note.





Diagnosis:


Problems:  


(1) Anxiety disorder


(2) Major depressive disorder, recurrent episode


(3) Psychosis, atypical


(4) Impulse control disorder











JULY ZAMUDIO MD Dec 25, 2018 19:03
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
## 2018-12-26 VITALS — SYSTOLIC BLOOD PRESSURE: 114 MMHG | DIASTOLIC BLOOD PRESSURE: 73 MMHG

## 2018-12-26 VITALS — DIASTOLIC BLOOD PRESSURE: 73 MMHG | SYSTOLIC BLOOD PRESSURE: 144 MMHG

## 2018-12-26 LAB
ALBUMIN SERPL-MCNC: 2.6 G/DL (ref 3.4–5)
ALBUMIN/GLOB SERPL: 0.9 {RATIO} (ref 1–1.7)
ALP SERPL-CCNC: 43 U/L (ref 46–116)
ALT SERPL-CCNC: 28 U/L (ref 14–59)
ANION GAP SERPL CALC-SCNC: 10 MMOL/L (ref 6–14)
AST SERPL-CCNC: 30 U/L (ref 15–37)
BASOPHILS # BLD AUTO: 0 X10^3/UL (ref 0–0.2)
BASOPHILS NFR BLD: 1 % (ref 0–3)
BILIRUB SERPL-MCNC: 0.3 MG/DL (ref 0.2–1)
BUN/CREAT SERPL: 43 (ref 6–20)
CA-I SERPL ISE-MCNC: 30 MG/DL (ref 7–20)
CALCIUM SERPL-MCNC: 8.2 MG/DL (ref 8.5–10.1)
CHLORIDE SERPL-SCNC: 106 MMOL/L (ref 98–107)
CO2 SERPL-SCNC: 27 MMOL/L (ref 21–32)
CREAT SERPL-MCNC: 0.7 MG/DL (ref 0.6–1)
EOSINOPHIL NFR BLD: 0.2 X10^3/UL (ref 0–0.7)
EOSINOPHIL NFR BLD: 4 % (ref 0–3)
ERYTHROCYTE [DISTWIDTH] IN BLOOD BY AUTOMATED COUNT: 15.9 % (ref 11.5–14.5)
GFR SERPLBLD BASED ON 1.73 SQ M-ARVRAT: 79.2 ML/MIN
GLOBULIN SER-MCNC: 3 G/DL (ref 2.2–3.8)
GLUCOSE SERPL-MCNC: 82 MG/DL (ref 70–99)
HCT VFR BLD CALC: 36.9 % (ref 36–47)
HGB BLD-MCNC: 12.2 G/DL (ref 12–15.5)
LYMPHOCYTES # BLD: 1.5 X10^3/UL (ref 1–4.8)
LYMPHOCYTES NFR BLD AUTO: 27 % (ref 24–48)
MCH RBC QN AUTO: 31 PG (ref 25–35)
MCHC RBC AUTO-ENTMCNC: 33 G/DL (ref 31–37)
MCV RBC AUTO: 94 FL (ref 79–100)
MONO #: 0.7 X10^3/UL (ref 0–1.1)
MONOCYTES NFR BLD: 13 % (ref 0–9)
NEUT #: 3.1 X10^3UL (ref 1.8–7.7)
NEUTROPHILS NFR BLD AUTO: 55 % (ref 31–73)
PLATELET # BLD AUTO: 158 X10^3/UL (ref 140–400)
POTASSIUM SERPL-SCNC: 4.6 MMOL/L (ref 3.5–5.1)
PROT SERPL-MCNC: 5.6 G/DL (ref 6.4–8.2)
RBC # BLD AUTO: 3.95 X10^6/UL (ref 3.5–5.4)
SODIUM SERPL-SCNC: 143 MMOL/L (ref 136–145)
VAL ACID: 47 MCG/ML (ref 50–100)
WBC # BLD AUTO: 5.6 X10^3/UL (ref 4–11)

## 2018-12-26 RX ADMIN — ALBUTEROL SULFATE SCH MG: 108 AEROSOL, METERED RESPIRATORY (INHALATION) at 19:53

## 2018-12-26 RX ADMIN — PANTOPRAZOLE SODIUM SCH MG: 40 TABLET, DELAYED RELEASE ORAL at 09:59

## 2018-12-26 RX ADMIN — I-VITE, TAB 1000-60-2MG (60/BT) SCH TAB: TAB at 09:59

## 2018-12-26 RX ADMIN — CARVEDILOL SCH MG: 6.25 TABLET, FILM COATED ORAL at 17:20

## 2018-12-26 RX ADMIN — CARVEDILOL SCH MG: 6.25 TABLET, FILM COATED ORAL at 10:01

## 2018-12-26 RX ADMIN — DIVALPROEX SODIUM SCH MG: 125 CAPSULE, COATED PELLETS ORAL at 20:31

## 2018-12-26 RX ADMIN — QUETIAPINE FUMARATE SCH MG: 25 TABLET, FILM COATED ORAL at 10:00

## 2018-12-26 RX ADMIN — LISINOPRIL SCH MG: 10 TABLET ORAL at 10:01

## 2018-12-26 RX ADMIN — ALBUTEROL SULFATE SCH MG: 108 AEROSOL, METERED RESPIRATORY (INHALATION) at 16:00

## 2018-12-26 RX ADMIN — ATORVASTATIN CALCIUM SCH MG: 20 TABLET, FILM COATED ORAL at 20:31

## 2018-12-26 RX ADMIN — Medication SCH EA: at 20:31

## 2018-12-26 RX ADMIN — SERTRALINE SCH MG: 25 TABLET, FILM COATED ORAL at 10:02

## 2018-12-26 RX ADMIN — BUDESONIDE SCH MG: 0.5 SUSPENSION RESPIRATORY (INHALATION) at 10:11

## 2018-12-26 RX ADMIN — ENOXAPARIN SODIUM SCH MG: 100 INJECTION SUBCUTANEOUS at 09:59

## 2018-12-26 RX ADMIN — LEVOTHYROXINE SODIUM SCH MCG: 75 TABLET ORAL at 06:14

## 2018-12-26 RX ADMIN — ALBUTEROL SULFATE SCH MG: 108 AEROSOL, METERED RESPIRATORY (INHALATION) at 05:26

## 2018-12-26 RX ADMIN — ASPIRIN SCH MG: 325 TABLET, DELAYED RELEASE ORAL at 10:00

## 2018-12-26 RX ADMIN — BUDESONIDE SCH MG: 0.5 SUSPENSION RESPIRATORY (INHALATION) at 19:53

## 2018-12-26 RX ADMIN — DONEPEZIL HYDROCHLORIDE SCH MG: 10 TABLET ORAL at 20:31

## 2018-12-26 RX ADMIN — MULTIPLE VITAMINS W/ MINERALS TAB SCH TAB: TAB at 09:59

## 2018-12-26 RX ADMIN — DIVALPROEX SODIUM SCH MG: 125 CAPSULE, COATED PELLETS ORAL at 09:59

## 2018-12-26 RX ADMIN — LIDOCAINE SCH PATCH: 50 PATCH CUTANEOUS at 10:01

## 2018-12-26 RX ADMIN — ALBUTEROL SULFATE SCH MG: 108 AEROSOL, METERED RESPIRATORY (INHALATION) at 10:11
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
## 2018-12-26 NOTE — PDOC
Exam


Note:


Marquise Note:


Please also refer to the separate dictated note~for this date of service 

dictated separately.~Patient seen individually. Discussed the patient with 

Nursing staff reviewed the chart.~Reviewed interim history and current 

functioning. Reviewed vital signs,~Labs/ Radiology~and current medications 

noted below. Continue current treatment with the changes noted in the dictated 

addendum note





Assessment:


Vital Signs:





 Vital Signs








  Date Time  Temp Pulse Resp B/P (MAP) Pulse Ox O2 Delivery O2 Flow Rate FiO2


 


12/26/18 19:30     96 Nasal Cannula 2.0 


 


12/26/18 17:20  73  114/73    


 


12/26/18 16:48 98.4  18     








I&O











Intake and Output 


 


 12/26/18





 07:01


 


Intake Total 1320 ml


 


Balance 1320 ml


 


 


 


Intake Oral 1320 ml








Labs:





 Laboratory Tests








Test


 12/26/18


07:12


 


White Blood Count


 5.6 x10^3/uL


(4.0-11.0)


 


Red Blood Count


 3.95 x10^6/uL


(3.50-5.40)


 


Hemoglobin


 12.2 g/dL


(12.0-15.5)


 


Hematocrit


 36.9 %


(36.0-47.0)


 


Mean Corpuscular Volume


 94 fL ()





 


Mean Corpuscular Hemoglobin 31 pg (25-35)  


 


Mean Corpuscular Hemoglobin


Concent 33 g/dL


(31-37)


 


Red Cell Distribution Width


 15.9 %


(11.5-14.5)  H


 


Platelet Count


 158 x10^3/uL


(140-400)


 


Neutrophils (%) (Auto) 55 % (31-73)  


 


Lymphocytes (%) (Auto) 27 % (24-48)  


 


Monocytes (%) (Auto) 13 % (0-9)  H


 


Eosinophils (%) (Auto) 4 % (0-3)  H


 


Basophils (%) (Auto) 1 % (0-3)  


 


Neutrophils # (Auto)


 3.1 x10^3uL


(1.8-7.7)


 


Lymphocytes # (Auto)


 1.5 x10^3/uL


(1.0-4.8)


 


Monocytes # (Auto)


 0.7 x10^3/uL


(0.0-1.1)


 


Eosinophils # (Auto)


 0.2 x10^3/uL


(0.0-0.7)


 


Basophils # (Auto)


 0.0 x10^3/uL


(0.0-0.2)


 


Sodium Level


 143 mmol/L


(136-145)


 


Potassium Level


 4.6 mmol/L


(3.5-5.1)


 


Chloride Level


 106 mmol/L


()


 


Carbon Dioxide Level


 27 mmol/L


(21-32)


 


Anion Gap 10 (6-14)  


 


Blood Urea Nitrogen


 30 mg/dL


(7-20)  H


 


Creatinine


 0.7 mg/dL


(0.6-1.0)


 


Estimated GFR


(Cockcroft-Gault) 79.2  





 


BUN/Creatinine Ratio 43 (6-20)  H


 


Glucose Level


 82 mg/dL


(70-99)


 


Calcium Level


 8.2 mg/dL


(8.5-10.1)  L


 


Total Bilirubin


 0.3 mg/dL


(0.2-1.0)


 


Aspartate Amino Transferase


(AST) 30 U/L (15-37)





 


Alanine Aminotransferase (ALT)


 28 U/L (14-59)





 


Alkaline Phosphatase


 43 U/L


()  L


 


Total Protein


 5.6 g/dL


(6.4-8.2)  L


 


Albumin


 2.6 g/dL


(3.4-5.0)  L


 


Albumin/Globulin Ratio


 0.9 (1.0-1.7)


L


 


Valproic Acid Level


 47 mcg/mL


()  L


 


Valproic Acid Last Dose Date 12/23/2018  


 


Valproic Acid Last Dose Time 2100  











Current Medications:


Meds:





Current Medications


Multi-Ingredient Ointment (Analgesic Balm) 1 louis PRN QID  PRN TP MUSCLE PAIN;  

Start 12/21/18 at 18:45


Al Hydroxide/Mg Hydroxide (Mylanta Plus Xs) 15 ml PRN AFTMEALHC  PRN PO 

DYSPEPSIA;  Start 12/21/18 at 18:45


Magnesium Hydroxide (Milk Of Magnesia) 2,400 mg PRN QHS  PRN PO CONSTIPATION;  

Start 12/21/18 at 18:45


Aspirin (Aspirin Enteric Coated) 325 mg DAILY PO  Last administered on 12/26/ 18at 10:00;  Start 12/22/18 at 09:00


Atorvastatin Calcium (Lipitor) 20 mg QHS PO  Last administered on 12/26/18at 20:

31;  Start 12/21/18 at 21:00


Estrogens Conjugated (Premarin) 0.5 louis PRN DAILY  PRN VG vaginal dryness;  

Start 12/21/18 at 19:15


Acetaminophen/ Hydrocodone Bitart (Lortab 5/325) 1 tab PRN Q12HR  PRN PO PAIN 

Last administered on 12/24/18at 09:06;  Start 12/21/18 at 19:15;  Stop 12/24/18 

at 15:02;  Status DC


Ipratropium Bromide (Atrovent) 0.2 mg PRN Q6HRS  PRN IH SHORTNESS OF BREATH;  

Start 12/21/18 at 19:15


Levothyroxine Sodium (Synthroid) 75 mcg DAILYAC PO  Last administered on 12/22/ 18at 08:40;  Start 12/22/18 at 07:30;  Stop 12/22/18 at 12:26;  Status DC


Lisinopril (Prinivil) 10 mg DAILY PO  Last administered on 12/26/18at 10:01;  

Start 12/22/18 at 09:00


Acetaminophen (Tylenol) 650 mg PRN Q6HRS  PRN PO PAIN / TEMP;  Start 12/21/18 

at 19:45


Non-Formulary Medication (Albuterol Sulfate (Albuterol Sulfate Conc Neb Soln)) 

2.5 mg PRN Q6HRS  PRN NEB SHORTNESS OF BREATH;  Start 12/21/18 at 19:15;  

Status UNV


Amlodipine Besylate (Norvasc) 10 mg DAILY PO  Last administered on 12/25/18at 08

:38;  Start 12/22/18 at 09:00;  Stop 12/25/18 at 12:27;  Status DC


Non-Formulary Medication (Budesonide/ Formoterol Fumarate (Symbicort 160-4.5 

Mcg Inhaler)) 2 puff BID IH ;  Start 12/21/18 at 21:00;  Status UNV


Carvedilol (Coreg) 6.25 mg BIDWMEALS PO  Last administered on 12/26/18at 17:20;

  Start 12/21/18 at 20:00


Divalproex Sodium (Depakote Er) 750 mg QHS PO  Last administered on 12/21/18at 

20:53;  Start 12/21/18 at 21:00;  Stop 12/22/18 at 21:13;  Status DC


Docusate Sodium (Colace) 100 mg PRN DAILY  PRN PO HARD STOOLS;  Start 12/21/18 

at 19:45


Donepezil HCl (Aricept) 10 mg QHS PO  Last administered on 12/26/18at 20:31;  

Start 12/21/18 at 21:00


Enoxaparin Sodium (Lovenox 40mg Syringe) 40 mg DAILY SQ  Last administered on 12 /26/18at 09:59;  Start 12/22/18 at 09:00


Hydralazine HCl (Apresoline) 10 mg TID PO  Last administered on 12/25/18at 08:39

;  Start 12/21/18 at 21:00;  Stop 12/25/18 at 12:27;  Status DC


Multivitamins/ Calcium (Thera-M Plus) 1 tab DAILY PO  Last administered on 12/26 /18at 09:59;  Start 12/22/18 at 09:00


Olanzapine (ZyPREXA) 5 mg PRN DAILY  PRN PO ANXIETY/AGITATION Last administered 

on 12/23/18at 10:50;  Start 12/22/18 at 09:00


Pantoprazole Sodium (Protonix) 40 mg DAILYAC PO  Last administered on 12/26/ 18at 09:59;  Start 12/22/18 at 07:30


Phenazopyridine HCl (Pyridium) 100 mg PRN TID  PRN PO URINARY PAIN;  Start 12/21 /18 at 19:45


Polyethylene Glycol (miraLAX) 17 gm PRN BID  PRN PO CONSTIPATION;  Start 12/22/ 18 at 09:00


Sodium Chloride (Saline Mist Nasal) 1 louis QID NS ;  Start 12/21/18 at 21:00;  

Status Cancel


Trazodone HCl (Desyrel) 25 mg PRN BID  PRN PO ANXIETY/AGITATION;  Start 12/21/ 18 at 19:45


Trazodone HCl (Desyrel) 50 mg PRN QHS  PRN PO INSOMNIA;  Start 12/21/18 at 19:45


Lidocaine (Lidoderm) 1 patch DAILY TD  Last administered on 12/26/18at 10:01;  

Start 12/22/18 at 09:00


Multivitamins/ Minerals (I-Irina) 1 tab DAILY PO  Last administered on 12/26/ 18at 09:59;  Start 12/22/18 at 09:00


Miscellaneous (Lidoderm Patch Removal) 1 ea QHS MC  Last administered on 12/26/ 18at 20:31;  Start 12/21/18 at 21:00


Albuterol Sulfate (Ventolin) 2.5 mg PRN Q6HRS  PRN NEB SHORTNESS OF BREATH;  

Start 12/21/18 at 19:45


Albuterol Sulfate (Ventolin) 2.5 mg RTQID NEB  Last administered on 12/26/18at 

19:53;  Start 12/21/18 at 20:00


Budesonide (Pulmicort) 0.5 mg RTBID NEB  Last administered on 12/26/18at 19:53;

  Start 12/21/18 at 20:00


Sodium Chloride (Ayr Saline Nasal) 2 louis QID NS ;  Start 12/21/18 at 21:00;  

Stop 12/22/18 at 13:08;  Status DC


Levothyroxine Sodium (Synthroid) 75 mcg DAILY06 PO  Last administered on 12/26/ 18at 06:14;  Start 12/23/18 at 06:00


Sodium Chloride (Saline Mist Nasal) 1 louis PRN QID  PRN NS NASAL CONGESTION;  

Start 12/22/18 at 13:15


Divalproex Sodium (Depakote Sprinkles) 750 mg HS PO  Last administered on 12/23/ 18at 20:20;  Start 12/22/18 at 21:30;  Stop 12/24/18 at 14:27;  Status DC


Sertraline HCl (Zoloft) 25 mg DAILY PO  Last administered on 12/25/18at 08:39;  

Start 12/23/18 at 09:00;  Stop 12/25/18 at 09:01;  Status DC


Sertraline HCl (Zoloft) 50 mg DAILY PO  Last administered on 12/26/18at 10:02;  

Start 12/26/18 at 09:00


Divalproex Sodium (Depakote Sprinkles) 500 mg BID94 PO  Last administered on 12/ 24/18at 07:55;  Start 12/24/18 at 09:00;  Stop 12/24/18 at 14:27;  Status DC


Quetiapine Fumarate (SEROquel) 12.5 mg 1300 PO  Last administered on 12/26/18at 

10:00;  Start 12/24/18 at 13:00


Divalproex Sodium (Depakote Sprinkles) 500 mg BID PO  Last administered on 12/26 /18at 09:59;  Start 12/24/18 at 21:00;  Stop 12/26/18 at 17:43;  Status DC


Acetaminophen/ Hydrocodone Bitart (Lortab 5/325) 1 tab PRN Q6HRS  PRN PO PAIN 

Last administered on 12/25/18at 18:36;  Start 12/24/18 at 15:15


Olanzapine (ZyPREXA ZYDIS) 2.5 mg PRN Q2HR  PRN PO PSYCHOSIS;  Start 12/24/18 

at 19:15


Divalproex Sodium (Depakote Sprinkles) 625 mg BID PO  Last administered on 12/26 /18at 20:31;  Start 12/26/18 at 21:00





Active Scripts


Active


Reported


[occuvite]   1 Tab PO DAILY


Pyridium (Phenazopyridine Hcl) 100 Mg Tablet 100 Mg PO PRN TID PRN


Trazodone Hcl 50 Mg Tablet 25 Mg PO PRN QHS PRN


Premarin (Estrogens, Conjugated) 30 Gm Cream.appl 0.5 Gm VG PRN DAILY PRN


Multivitamins (Multivitamin) 1 Each Tablet 1 Tab PO DAILY


Trazodone Hcl 50 Mg Tablet 25 Mg PO BID PRN


Ayr Saline (Sodium Chloride) 50 Ml Drops 2 Drop NS QID


Miralax (Polyethylene Glycol 3350) 17 Gm Powd.pack 17 Gm PO PRN BID PRN


Protonix (Pantoprazole Sodium) 40 Mg Tablet.dr 40 Mg PO DAILY


Zyprexa (Olanzapine) 5 Mg Tablet 5 Mg PO PRN DAILY PRN


Lisinopril 10 Mg Tablet 10 Mg PO DAILY


[lidoderm]   1 Patch TD DAILY


Levothyroxine Sodium 75 Mcg Tablet 75 Mcg PO DAILYAC


Ipratropium Bromide 0.2 Mg/1 Ml Solution 0.2 Mg IH PRN Q6HRS PRN


Hydrocodone-Apap 5-325  ** (Hydrocodone Bit/Acetaminophen) 1 Each Tablet 1 Tab 

PO PRN Q12HR PRN


Hydralazine Hcl 10 Mg Tablet 10 Mg PO TID


Lovenox (Enoxaparin Sodium) 40 Mg/0.4 Ml Disp.syrin 40 Mg SQ DAILY


Donepezil Hcl 10 Mg Tablet 10 Mg PO HS


Colace (Docusate Sodium) 100 Mg Capsule 100 Mg PO PRN DAILY PRN


Depakote Er (Divalproex Sodium) 500 Mg Tab.er.24h 750 Mg PO HS


Coreg  ** (Carvedilol) 6.25 Mg Tablet 6.25 Mg PO BIDWMEALS


Symbicort 160-4.5 Mcg Inhaler (Budesonide/Formoterol Fumarate) 10.2 Gm 

Hfa.aer.ad 2 Puff IH BID


Lipitor (Atorvastatin Calcium) 20 Mg Tablet 20 Mg PO QHS


Aspirin Ec (Aspirin) 325 Mg Tablet. 325 Mg PO DAILY


Amlodipine Besylate 10 Mg Tablet 10 Mg PO DAILY


Albuterol Sulfate Conc Neb Soln (Albuterol Sulfate) 2.5 Mg/0.5 Ml Vial.neb 2.5 

Mg NEB PRN Q6HRS PRN


Acetaminophen 650 Mg/20.3 Ml Solution 650 Mg PO PRN Q6HRS PRN


I have reviewed the current psychotropics carefully including drug 

interactions.  Risk benefit ratio favors no change other than as noted in my 

dictated progress note.





Diagnosis:


Problems:  


(1) Anxiety disorder


(2) Major depressive disorder, recurrent episode


(3) Psychosis, atypical


(4) Impulse control disorder











JULY ZAMUDIO MD Dec 26, 2018 22:48

## 2018-12-27 VITALS — SYSTOLIC BLOOD PRESSURE: 154 MMHG | DIASTOLIC BLOOD PRESSURE: 88 MMHG

## 2018-12-27 VITALS — DIASTOLIC BLOOD PRESSURE: 61 MMHG | SYSTOLIC BLOOD PRESSURE: 111 MMHG

## 2018-12-27 RX ADMIN — MULTIPLE VITAMINS W/ MINERALS TAB SCH TAB: TAB at 07:56

## 2018-12-27 RX ADMIN — ENOXAPARIN SODIUM SCH MG: 100 INJECTION SUBCUTANEOUS at 07:59

## 2018-12-27 RX ADMIN — LISINOPRIL SCH MG: 10 TABLET ORAL at 07:58

## 2018-12-27 RX ADMIN — PANTOPRAZOLE SODIUM SCH MG: 40 TABLET, DELAYED RELEASE ORAL at 07:56

## 2018-12-27 RX ADMIN — ALBUTEROL SULFATE SCH MG: 108 AEROSOL, METERED RESPIRATORY (INHALATION) at 10:57

## 2018-12-27 RX ADMIN — DONEPEZIL HYDROCHLORIDE SCH MG: 10 TABLET ORAL at 19:27

## 2018-12-27 RX ADMIN — ALBUTEROL SULFATE SCH MG: 108 AEROSOL, METERED RESPIRATORY (INHALATION) at 20:20

## 2018-12-27 RX ADMIN — BUDESONIDE SCH MG: 0.5 SUSPENSION RESPIRATORY (INHALATION) at 10:57

## 2018-12-27 RX ADMIN — LIDOCAINE SCH PATCH: 50 PATCH CUTANEOUS at 07:59

## 2018-12-27 RX ADMIN — ASPIRIN SCH MG: 325 TABLET, DELAYED RELEASE ORAL at 07:58

## 2018-12-27 RX ADMIN — CARVEDILOL SCH MG: 6.25 TABLET, FILM COATED ORAL at 07:57

## 2018-12-27 RX ADMIN — LEVOTHYROXINE SODIUM SCH MCG: 75 TABLET ORAL at 06:14

## 2018-12-27 RX ADMIN — HYDROCODONE BITARTRATE AND ACETAMINOPHEN PRN TAB: 5; 325 TABLET ORAL at 12:17

## 2018-12-27 RX ADMIN — ALBUTEROL SULFATE SCH MG: 108 AEROSOL, METERED RESPIRATORY (INHALATION) at 04:51

## 2018-12-27 RX ADMIN — QUETIAPINE FUMARATE SCH MG: 25 TABLET, FILM COATED ORAL at 14:42

## 2018-12-27 RX ADMIN — Medication SCH EA: at 19:27

## 2018-12-27 RX ADMIN — DIVALPROEX SODIUM SCH MG: 125 CAPSULE, COATED PELLETS ORAL at 19:27

## 2018-12-27 RX ADMIN — DIVALPROEX SODIUM SCH MG: 125 CAPSULE, COATED PELLETS ORAL at 07:56

## 2018-12-27 RX ADMIN — BUDESONIDE SCH MG: 0.5 SUSPENSION RESPIRATORY (INHALATION) at 20:20

## 2018-12-27 RX ADMIN — CARVEDILOL SCH MG: 6.25 TABLET, FILM COATED ORAL at 17:16

## 2018-12-27 RX ADMIN — ALBUTEROL SULFATE SCH MG: 108 AEROSOL, METERED RESPIRATORY (INHALATION) at 16:49

## 2018-12-27 RX ADMIN — I-VITE, TAB 1000-60-2MG (60/BT) SCH TAB: TAB at 07:56

## 2018-12-27 RX ADMIN — SERTRALINE SCH MG: 25 TABLET, FILM COATED ORAL at 07:56

## 2018-12-27 RX ADMIN — ATORVASTATIN CALCIUM SCH MG: 20 TABLET, FILM COATED ORAL at 19:27

## 2018-12-27 NOTE — PDOC
Exam


Note:


Marquise Note:


Please also refer to the separate dictated note~for this date of service 

dictated separately.~Patient seen individually. Discussed the patient with 

Nursing staff reviewed the chart.~Reviewed interim history and current 

functioning. Reviewed vital signs,~Labs/ Radiology~and current medications 

noted below. Continue current treatment with the changes noted in the dictated 

addendum note





Assessment:


Vital Signs:





 Vital Signs








  Date Time  Temp Pulse Resp B/P (MAP) Pulse Ox O2 Delivery O2 Flow Rate FiO2


 


12/27/18 20:00     97 Nasal Cannula 2.0 


 


12/27/18 17:16  60  111/61    


 


12/27/18 16:11 97.7  20     








I&O











Intake and Output 


 


 12/27/18





 07:01


 


Intake Total 560 ml


 


Balance 560 ml


 


 


 


Intake Oral 560 ml











Current Medications:


Meds:





Current Medications


Multi-Ingredient Ointment (Analgesic Balm) 1 louis PRN QID  PRN TP MUSCLE PAIN;  

Start 12/21/18 at 18:45


Al Hydroxide/Mg Hydroxide (Mylanta Plus Xs) 15 ml PRN AFTMEALHC  PRN PO 

DYSPEPSIA;  Start 12/21/18 at 18:45


Magnesium Hydroxide (Milk Of Magnesia) 2,400 mg PRN QHS  PRN PO CONSTIPATION;  

Start 12/21/18 at 18:45


Aspirin (Aspirin Enteric Coated) 325 mg DAILY PO  Last administered on 12/27/ 18at 07:58;  Start 12/22/18 at 09:00


Atorvastatin Calcium (Lipitor) 20 mg QHS PO  Last administered on 12/27/18at 19:

27;  Start 12/21/18 at 21:00


Estrogens Conjugated (Premarin) 0.5 louis PRN DAILY  PRN VG vaginal dryness;  

Start 12/21/18 at 19:15


Acetaminophen/ Hydrocodone Bitart (Lortab 5/325) 1 tab PRN Q12HR  PRN PO PAIN 

Last administered on 12/24/18at 09:06;  Start 12/21/18 at 19:15;  Stop 12/24/18 

at 15:02;  Status DC


Ipratropium Bromide (Atrovent) 0.2 mg PRN Q6HRS  PRN IH SHORTNESS OF BREATH;  

Start 12/21/18 at 19:15


Levothyroxine Sodium (Synthroid) 75 mcg DAILYAC PO  Last administered on 12/22/ 18at 08:40;  Start 12/22/18 at 07:30;  Stop 12/22/18 at 12:26;  Status DC


Lisinopril (Prinivil) 10 mg DAILY PO  Last administered on 12/27/18at 07:58;  

Start 12/22/18 at 09:00


Acetaminophen (Tylenol) 650 mg PRN Q6HRS  PRN PO PAIN / TEMP;  Start 12/21/18 

at 19:45


Non-Formulary Medication (Albuterol Sulfate (Albuterol Sulfate Conc Neb Soln)) 

2.5 mg PRN Q6HRS  PRN NEB SHORTNESS OF BREATH;  Start 12/21/18 at 19:15;  

Status UNV


Amlodipine Besylate (Norvasc) 10 mg DAILY PO  Last administered on 12/25/18at 08

:38;  Start 12/22/18 at 09:00;  Stop 12/25/18 at 12:27;  Status DC


Non-Formulary Medication (Budesonide/ Formoterol Fumarate (Symbicort 160-4.5 

Mcg Inhaler)) 2 puff BID IH ;  Start 12/21/18 at 21:00;  Status UNV


Carvedilol (Coreg) 6.25 mg BIDWMEALS PO  Last administered on 12/27/18at 17:16;

  Start 12/21/18 at 20:00


Divalproex Sodium (Depakote Er) 750 mg QHS PO  Last administered on 12/21/18at 

20:53;  Start 12/21/18 at 21:00;  Stop 12/22/18 at 21:13;  Status DC


Docusate Sodium (Colace) 100 mg PRN DAILY  PRN PO HARD STOOLS;  Start 12/21/18 

at 19:45


Donepezil HCl (Aricept) 10 mg QHS PO  Last administered on 12/27/18at 19:27;  

Start 12/21/18 at 21:00


Enoxaparin Sodium (Lovenox 40mg Syringe) 40 mg DAILY SQ  Last administered on 12 /27/18at 07:59;  Start 12/22/18 at 09:00


Hydralazine HCl (Apresoline) 10 mg TID PO  Last administered on 12/25/18at 08:39

;  Start 12/21/18 at 21:00;  Stop 12/25/18 at 12:27;  Status DC


Multivitamins/ Calcium (Thera-M Plus) 1 tab DAILY PO  Last administered on 12/27 /18at 07:56;  Start 12/22/18 at 09:00


Olanzapine (ZyPREXA) 5 mg PRN DAILY  PRN PO ANXIETY/AGITATION Last administered 

on 12/23/18at 10:50;  Start 12/22/18 at 09:00


Pantoprazole Sodium (Protonix) 40 mg DAILYAC PO  Last administered on 12/27/ 18at 07:56;  Start 12/22/18 at 07:30


Phenazopyridine HCl (Pyridium) 100 mg PRN TID  PRN PO URINARY PAIN;  Start 12/21 /18 at 19:45


Polyethylene Glycol (miraLAX) 17 gm PRN BID  PRN PO CONSTIPATION;  Start 12/22/ 18 at 09:00


Sodium Chloride (Saline Mist Nasal) 1 louis QID NS ;  Start 12/21/18 at 21:00;  

Status Cancel


Trazodone HCl (Desyrel) 25 mg PRN BID  PRN PO ANXIETY/AGITATION;  Start 12/21/ 18 at 19:45


Trazodone HCl (Desyrel) 50 mg PRN QHS  PRN PO INSOMNIA;  Start 12/21/18 at 19:45


Lidocaine (Lidoderm) 1 patch DAILY TD  Last administered on 12/27/18at 07:59;  

Start 12/22/18 at 09:00


Multivitamins/ Minerals (I-Irina) 1 tab DAILY PO  Last administered on 12/27/ 18at 07:56;  Start 12/22/18 at 09:00


Miscellaneous (Lidoderm Patch Removal) 1 ea QHS MC  Last administered on 12/27/ 18at 19:27;  Start 12/21/18 at 21:00


Albuterol Sulfate (Ventolin) 2.5 mg PRN Q6HRS  PRN NEB SHORTNESS OF BREATH;  

Start 12/21/18 at 19:45


Albuterol Sulfate (Ventolin) 2.5 mg RTQID NEB  Last administered on 12/27/18at 

20:20;  Start 12/21/18 at 20:00


Budesonide (Pulmicort) 0.5 mg RTBID NEB  Last administered on 12/27/18at 20:20;

  Start 12/21/18 at 20:00


Sodium Chloride (Ayr Saline Nasal) 2 louis QID NS ;  Start 12/21/18 at 21:00;  

Stop 12/22/18 at 13:08;  Status DC


Levothyroxine Sodium (Synthroid) 75 mcg DAILY06 PO  Last administered on 12/27/ 18at 06:14;  Start 12/23/18 at 06:00


Sodium Chloride (Saline Mist Nasal) 1 louis PRN QID  PRN NS NASAL CONGESTION;  

Start 12/22/18 at 13:15


Divalproex Sodium (Depakote Sprinkles) 750 mg HS PO  Last administered on 12/23/ 18at 20:20;  Start 12/22/18 at 21:30;  Stop 12/24/18 at 14:27;  Status DC


Sertraline HCl (Zoloft) 25 mg DAILY PO  Last administered on 12/25/18at 08:39;  

Start 12/23/18 at 09:00;  Stop 12/25/18 at 09:01;  Status DC


Sertraline HCl (Zoloft) 50 mg DAILY PO  Last administered on 12/27/18at 07:56;  

Start 12/26/18 at 09:00


Divalproex Sodium (Depakote Sprinkles) 500 mg BID94 PO  Last administered on 12/ 24/18at 07:55;  Start 12/24/18 at 09:00;  Stop 12/24/18 at 14:27;  Status DC


Quetiapine Fumarate (SEROquel) 12.5 mg 1300 PO  Last administered on 12/27/18at 

14:42;  Start 12/24/18 at 13:00


Divalproex Sodium (Depakote Sprinkles) 500 mg BID PO  Last administered on 12/26 /18at 09:59;  Start 12/24/18 at 21:00;  Stop 12/26/18 at 17:43;  Status DC


Acetaminophen/ Hydrocodone Bitart (Lortab 5/325) 1 tab PRN Q6HRS  PRN PO PAIN 

Last administered on 12/27/18at 12:17;  Start 12/24/18 at 15:15


Olanzapine (ZyPREXA ZYDIS) 2.5 mg PRN Q2HR  PRN PO PSYCHOSIS;  Start 12/24/18 

at 19:15


Divalproex Sodium (Depakote Sprinkles) 625 mg BID PO  Last administered on 12/27 /18at 19:27;  Start 12/26/18 at 21:00





Active Scripts


Active


Reported


[occuvite]   1 Tab PO DAILY


Pyridium (Phenazopyridine Hcl) 100 Mg Tablet 100 Mg PO PRN TID PRN


Trazodone Hcl 50 Mg Tablet 25 Mg PO PRN QHS PRN


Premarin (Estrogens, Conjugated) 30 Gm Cream.appl 0.5 Gm VG PRN DAILY PRN


Multivitamins (Multivitamin) 1 Each Tablet 1 Tab PO DAILY


Trazodone Hcl 50 Mg Tablet 25 Mg PO BID PRN


Ayr Saline (Sodium Chloride) 50 Ml Drops 2 Drop NS QID


Miralax (Polyethylene Glycol 3350) 17 Gm Powd.pack 17 Gm PO PRN BID PRN


Protonix (Pantoprazole Sodium) 40 Mg Tablet.dr 40 Mg PO DAILY


Zyprexa (Olanzapine) 5 Mg Tablet 5 Mg PO PRN DAILY PRN


Lisinopril 10 Mg Tablet 10 Mg PO DAILY


[lidoderm]   1 Patch TD DAILY


Levothyroxine Sodium 75 Mcg Tablet 75 Mcg PO DAILYAC


Ipratropium Bromide 0.2 Mg/1 Ml Solution 0.2 Mg IH PRN Q6HRS PRN


Hydrocodone-Apap 5-325  ** (Hydrocodone Bit/Acetaminophen) 1 Each Tablet 1 Tab 

PO PRN Q12HR PRN


Hydralazine Hcl 10 Mg Tablet 10 Mg PO TID


Lovenox (Enoxaparin Sodium) 40 Mg/0.4 Ml Disp.syrin 40 Mg SQ DAILY


Donepezil Hcl 10 Mg Tablet 10 Mg PO HS


Colace (Docusate Sodium) 100 Mg Capsule 100 Mg PO PRN DAILY PRN


Depakote Er (Divalproex Sodium) 500 Mg Tab.er.24h 750 Mg PO HS


Coreg  ** (Carvedilol) 6.25 Mg Tablet 6.25 Mg PO BIDWMEALS


Symbicort 160-4.5 Mcg Inhaler (Budesonide/Formoterol Fumarate) 10.2 Gm 

Hfa.aer.ad 2 Puff IH BID


Lipitor (Atorvastatin Calcium) 20 Mg Tablet 20 Mg PO QHS


Aspirin Ec (Aspirin) 325 Mg Tablet. 325 Mg PO DAILY


Amlodipine Besylate 10 Mg Tablet 10 Mg PO DAILY


Albuterol Sulfate Conc Neb Soln (Albuterol Sulfate) 2.5 Mg/0.5 Ml Vial.neb 2.5 

Mg NEB PRN Q6HRS PRN


Acetaminophen 650 Mg/20.3 Ml Solution 650 Mg PO PRN Q6HRS PRN


I have reviewed the current psychotropics carefully including drug 

interactions.  Risk benefit ratio favors no change other than as noted in my 

dictated progress note.





Diagnosis:


Problems:  


(1) Anxiety disorder


(2) Major depressive disorder, recurrent episode


(3) Psychosis, atypical


(4) Impulse control disorder











JULY ZAMUDIO MD Dec 27, 2018 22:44

## 2018-12-28 VITALS — DIASTOLIC BLOOD PRESSURE: 78 MMHG | SYSTOLIC BLOOD PRESSURE: 144 MMHG

## 2018-12-28 VITALS — DIASTOLIC BLOOD PRESSURE: 78 MMHG | SYSTOLIC BLOOD PRESSURE: 138 MMHG

## 2018-12-28 RX ADMIN — CARVEDILOL SCH MG: 6.25 TABLET, FILM COATED ORAL at 07:31

## 2018-12-28 RX ADMIN — ACETAMINOPHEN PRN MG: 325 TABLET, FILM COATED ORAL at 20:03

## 2018-12-28 RX ADMIN — DIVALPROEX SODIUM SCH MG: 125 CAPSULE, COATED PELLETS ORAL at 19:44

## 2018-12-28 RX ADMIN — HYDROCODONE BITARTRATE AND ACETAMINOPHEN PRN TAB: 5; 325 TABLET ORAL at 17:34

## 2018-12-28 RX ADMIN — Medication SCH EA: at 19:44

## 2018-12-28 RX ADMIN — LISINOPRIL SCH MG: 10 TABLET ORAL at 07:32

## 2018-12-28 RX ADMIN — DIVALPROEX SODIUM SCH MG: 125 CAPSULE, COATED PELLETS ORAL at 07:31

## 2018-12-28 RX ADMIN — CARVEDILOL SCH MG: 6.25 TABLET, FILM COATED ORAL at 17:35

## 2018-12-28 RX ADMIN — QUETIAPINE FUMARATE SCH MG: 25 TABLET, FILM COATED ORAL at 13:10

## 2018-12-28 RX ADMIN — LIDOCAINE SCH PATCH: 50 PATCH CUTANEOUS at 07:31

## 2018-12-28 RX ADMIN — ASPIRIN SCH MG: 325 TABLET, DELAYED RELEASE ORAL at 07:32

## 2018-12-28 RX ADMIN — ENOXAPARIN SODIUM SCH MG: 100 INJECTION SUBCUTANEOUS at 07:31

## 2018-12-28 RX ADMIN — DONEPEZIL HYDROCHLORIDE SCH MG: 10 TABLET ORAL at 19:44

## 2018-12-28 RX ADMIN — ALBUTEROL SULFATE SCH MG: 108 AEROSOL, METERED RESPIRATORY (INHALATION) at 09:59

## 2018-12-28 RX ADMIN — ALBUTEROL SULFATE SCH MG: 108 AEROSOL, METERED RESPIRATORY (INHALATION) at 21:16

## 2018-12-28 RX ADMIN — BUDESONIDE SCH MG: 0.5 SUSPENSION RESPIRATORY (INHALATION) at 09:59

## 2018-12-28 RX ADMIN — SERTRALINE SCH MG: 25 TABLET, FILM COATED ORAL at 07:32

## 2018-12-28 RX ADMIN — ATORVASTATIN CALCIUM SCH MG: 20 TABLET, FILM COATED ORAL at 19:44

## 2018-12-28 RX ADMIN — PANTOPRAZOLE SODIUM SCH MG: 40 TABLET, DELAYED RELEASE ORAL at 07:31

## 2018-12-28 RX ADMIN — LEVOTHYROXINE SODIUM SCH MCG: 75 TABLET ORAL at 04:55

## 2018-12-28 RX ADMIN — I-VITE, TAB 1000-60-2MG (60/BT) SCH TAB: TAB at 07:31

## 2018-12-28 RX ADMIN — MULTIPLE VITAMINS W/ MINERALS TAB SCH TAB: TAB at 07:32

## 2018-12-28 RX ADMIN — ALBUTEROL SULFATE SCH MG: 108 AEROSOL, METERED RESPIRATORY (INHALATION) at 15:09

## 2018-12-28 RX ADMIN — ALBUTEROL SULFATE SCH MG: 108 AEROSOL, METERED RESPIRATORY (INHALATION) at 15:07

## 2018-12-28 RX ADMIN — BUDESONIDE SCH MG: 0.5 SUSPENSION RESPIRATORY (INHALATION) at 21:16

## 2018-12-28 NOTE — PN
DATE:  12/27/2018



PSYCHIATRIC PROGRESS NOTE



This late entry 12/27/2018 covers elements not covered in my initial note.



SUBJECTIVE:  I met with the patient in the evening.  The patient slept 5-3/4

hours previous night.  She has been somewhat withdrawn, somewhat anxious,

labile.  I have gotten information from the family that she has a long history

of bipolar disorder.  We will try and obtain those records.



REVIEW OF SYSTEMS:  Ambulation impaired, in wheelchair.  No CV, , pulmonary,

eye, ENT system symptoms on review.



MENTAL STATUS EXAM:  Oriented to herself and situation.  Speech is coherent at

times, somewhat pressured.  She is convinced that her son is trying to steal

from her.  Abstraction fair, computation impaired, language function intact. 

Mood and affect remain somewhat anxious, labile.



LABORATORY DATA:  Reviewed.



IMPRESSION:  Probable bipolar 1 disorder, mixed with psychotic features;

cognitive disorder, unspecified.  Rest unchanged.



PLAN:  Get past psychiatric records, continue psychotropics unchanged for now. 

Valproic acid level subtherapeutic at 47.  We may need to adjust Depakote to

reach therapeutic level.





______________________________

MAN ARLENE ZAMUDIO MD



DR:  GINNA/christiano  JOB#:  [id number] / [id number]

DD:  12/28/2018 16:32  DT:  12/28/2018 20:56

## 2018-12-28 NOTE — PDOC
Exam


Note:


Marquise Note:


Please also refer to the separate dictated note~for this date of service 

dictated separately.~Patient seen individually. Discussed the patient with 

Nursing staff reviewed the chart.~Reviewed interim history and current 

functioning. Reviewed vital signs,~Labs/ Radiology~and current medications 

noted below. Continue current treatment with the changes noted in the dictated 

addendum note





Assessment:


Vital Signs:





 Vital Signs








  Date Time  Temp Pulse Resp B/P (MAP) Pulse Ox O2 Delivery O2 Flow Rate FiO2


 


12/28/18 21:15     96 Nasal Cannula 2.5 


 


12/28/18 17:35  66  138/78    


 


12/28/18 16:40 97.4  16     








I&O











Intake and Output 


 


 12/28/18





 07:01


 


Intake Total 845 ml


 


Balance 845 ml


 


 


 


Intake Oral 845 ml


 


# Bowel Movements 1











Current Medications:


Meds:





Current Medications


Multi-Ingredient Ointment (Analgesic Balm) 1 louis PRN QID  PRN TP MUSCLE PAIN;  

Start 12/21/18 at 18:45


Al Hydroxide/Mg Hydroxide (Mylanta Plus Xs) 15 ml PRN AFTMEALHC  PRN PO 

DYSPEPSIA;  Start 12/21/18 at 18:45


Magnesium Hydroxide (Milk Of Magnesia) 2,400 mg PRN QHS  PRN PO CONSTIPATION;  

Start 12/21/18 at 18:45


Aspirin (Aspirin Enteric Coated) 325 mg DAILY PO  Last administered on 12/28/ 18at 07:32;  Start 12/22/18 at 09:00


Atorvastatin Calcium (Lipitor) 20 mg QHS PO  Last administered on 12/28/18at 19:

44;  Start 12/21/18 at 21:00


Estrogens Conjugated (Premarin) 0.5 louis PRN DAILY  PRN VG vaginal dryness;  

Start 12/21/18 at 19:15


Acetaminophen/ Hydrocodone Bitart (Lortab 5/325) 1 tab PRN Q12HR  PRN PO PAIN 

Last administered on 12/24/18at 09:06;  Start 12/21/18 at 19:15;  Stop 12/24/18 

at 15:02;  Status DC


Ipratropium Bromide (Atrovent) 0.2 mg PRN Q6HRS  PRN IH SHORTNESS OF BREATH;  

Start 12/21/18 at 19:15


Levothyroxine Sodium (Synthroid) 75 mcg DAILYAC PO  Last administered on 12/22/ 18at 08:40;  Start 12/22/18 at 07:30;  Stop 12/22/18 at 12:26;  Status DC


Lisinopril (Prinivil) 10 mg DAILY PO  Last administered on 12/28/18at 07:32;  

Start 12/22/18 at 09:00


Acetaminophen (Tylenol) 650 mg PRN Q6HRS  PRN PO PAIN / TEMP Last administered 

on 12/28/18at 20:03;  Start 12/21/18 at 19:45


Non-Formulary Medication (Albuterol Sulfate (Albuterol Sulfate Conc Neb Soln)) 

2.5 mg PRN Q6HRS  PRN NEB SHORTNESS OF BREATH;  Start 12/21/18 at 19:15;  

Status UNV


Amlodipine Besylate (Norvasc) 10 mg DAILY PO  Last administered on 12/25/18at 08

:38;  Start 12/22/18 at 09:00;  Stop 12/25/18 at 12:27;  Status DC


Non-Formulary Medication (Budesonide/ Formoterol Fumarate (Symbicort 160-4.5 

Mcg Inhaler)) 2 puff BID IH ;  Start 12/21/18 at 21:00;  Status UNV


Carvedilol (Coreg) 6.25 mg BIDWMEALS PO  Last administered on 12/28/18at 17:35;

  Start 12/21/18 at 20:00


Divalproex Sodium (Depakote Er) 750 mg QHS PO  Last administered on 12/21/18at 

20:53;  Start 12/21/18 at 21:00;  Stop 12/22/18 at 21:13;  Status DC


Docusate Sodium (Colace) 100 mg PRN DAILY  PRN PO HARD STOOLS;  Start 12/21/18 

at 19:45


Donepezil HCl (Aricept) 10 mg QHS PO  Last administered on 12/28/18at 19:44;  

Start 12/21/18 at 21:00


Enoxaparin Sodium (Lovenox 40mg Syringe) 40 mg DAILY SQ  Last administered on 12 /28/18at 07:31;  Start 12/22/18 at 09:00


Hydralazine HCl (Apresoline) 10 mg TID PO  Last administered on 12/25/18at 08:39

;  Start 12/21/18 at 21:00;  Stop 12/25/18 at 12:27;  Status DC


Multivitamins/ Calcium (Thera-M Plus) 1 tab DAILY PO  Last administered on 12/28 /18at 07:32;  Start 12/22/18 at 09:00


Olanzapine (ZyPREXA) 5 mg PRN DAILY  PRN PO ANXIETY/AGITATION Last administered 

on 12/23/18at 10:50;  Start 12/22/18 at 09:00


Pantoprazole Sodium (Protonix) 40 mg DAILYAC PO  Last administered on 12/28/ 18at 07:31;  Start 12/22/18 at 07:30


Phenazopyridine HCl (Pyridium) 100 mg PRN TID  PRN PO URINARY PAIN;  Start 12/21 /18 at 19:45


Polyethylene Glycol (miraLAX) 17 gm PRN BID  PRN PO CONSTIPATION;  Start 12/22/ 18 at 09:00


Sodium Chloride (Saline Mist Nasal) 1 louis QID NS ;  Start 12/21/18 at 21:00;  

Status Cancel


Trazodone HCl (Desyrel) 25 mg PRN BID  PRN PO ANXIETY/AGITATION;  Start 12/21/ 18 at 19:45


Trazodone HCl (Desyrel) 50 mg PRN QHS  PRN PO INSOMNIA;  Start 12/21/18 at 19:45


Lidocaine (Lidoderm) 1 patch DAILY TD  Last administered on 12/28/18at 07:31;  

Start 12/22/18 at 09:00


Multivitamins/ Minerals (I-Irina) 1 tab DAILY PO  Last administered on 12/28/ 18at 07:31;  Start 12/22/18 at 09:00


Miscellaneous (Lidoderm Patch Removal) 1 ea QHS MC  Last administered on 12/28/ 18at 19:44;  Start 12/21/18 at 21:00


Albuterol Sulfate (Ventolin) 2.5 mg PRN Q6HRS  PRN NEB SHORTNESS OF BREATH;  

Start 12/21/18 at 19:45


Albuterol Sulfate (Ventolin) 2.5 mg RTQID NEB  Last administered on 12/28/18at 

21:16;  Start 12/21/18 at 20:00


Budesonide (Pulmicort) 0.5 mg RTBID NEB  Last administered on 12/28/18at 21:16;

  Start 12/21/18 at 20:00


Sodium Chloride (Ayr Saline Nasal) 2 louis QID NS ;  Start 12/21/18 at 21:00;  

Stop 12/22/18 at 13:08;  Status DC


Levothyroxine Sodium (Synthroid) 75 mcg DAILY06 PO  Last administered on 12/28/ 18at 04:55;  Start 12/23/18 at 06:00


Sodium Chloride (Saline Mist Nasal) 1 louis PRN QID  PRN NS NASAL CONGESTION;  

Start 12/22/18 at 13:15


Divalproex Sodium (Depakote Sprinkles) 750 mg HS PO  Last administered on 12/23/ 18at 20:20;  Start 12/22/18 at 21:30;  Stop 12/24/18 at 14:27;  Status DC


Sertraline HCl (Zoloft) 25 mg DAILY PO  Last administered on 12/25/18at 08:39;  

Start 12/23/18 at 09:00;  Stop 12/25/18 at 09:01;  Status DC


Sertraline HCl (Zoloft) 50 mg DAILY PO  Last administered on 12/28/18at 07:32;  

Start 12/26/18 at 09:00


Divalproex Sodium (Depakote Sprinkles) 500 mg BID94 PO  Last administered on 12/ 24/18at 07:55;  Start 12/24/18 at 09:00;  Stop 12/24/18 at 14:27;  Status DC


Quetiapine Fumarate (SEROquel) 12.5 mg 1300 PO  Last administered on 12/28/18at 

13:10;  Start 12/24/18 at 13:00


Divalproex Sodium (Depakote Sprinkles) 500 mg BID PO  Last administered on 12/26 /18at 09:59;  Start 12/24/18 at 21:00;  Stop 12/26/18 at 17:43;  Status DC


Acetaminophen/ Hydrocodone Bitart (Lortab 5/325) 1 tab PRN Q6HRS  PRN PO PAIN 

Last administered on 12/28/18at 17:34;  Start 12/24/18 at 15:15


Olanzapine (ZyPREXA ZYDIS) 2.5 mg PRN Q2HR  PRN PO PSYCHOSIS;  Start 12/24/18 

at 19:15


Divalproex Sodium (Depakote Sprinkles) 625 mg BID PO  Last administered on 12/28 /18at 19:44;  Start 12/26/18 at 21:00





Active Scripts


Active


Reported


[occuvite]   1 Tab PO DAILY


Pyridium (Phenazopyridine Hcl) 100 Mg Tablet 100 Mg PO PRN TID PRN


Trazodone Hcl 50 Mg Tablet 25 Mg PO PRN QHS PRN


Premarin (Estrogens, Conjugated) 30 Gm Cream.appl 0.5 Gm VG PRN DAILY PRN


Multivitamins (Multivitamin) 1 Each Tablet 1 Tab PO DAILY


Trazodone Hcl 50 Mg Tablet 25 Mg PO BID PRN


Ayr Saline (Sodium Chloride) 50 Ml Drops 2 Drop NS QID


Miralax (Polyethylene Glycol 3350) 17 Gm Powd.pack 17 Gm PO PRN BID PRN


Protonix (Pantoprazole Sodium) 40 Mg Tablet.dr 40 Mg PO DAILY


Zyprexa (Olanzapine) 5 Mg Tablet 5 Mg PO PRN DAILY PRN


Lisinopril 10 Mg Tablet 10 Mg PO DAILY


[lidoderm]   1 Patch TD DAILY


Levothyroxine Sodium 75 Mcg Tablet 75 Mcg PO DAILYAC


Ipratropium Bromide 0.2 Mg/1 Ml Solution 0.2 Mg IH PRN Q6HRS PRN


Hydrocodone-Apap 5-325  ** (Hydrocodone Bit/Acetaminophen) 1 Each Tablet 1 Tab 

PO PRN Q12HR PRN


Hydralazine Hcl 10 Mg Tablet 10 Mg PO TID


Lovenox (Enoxaparin Sodium) 40 Mg/0.4 Ml Disp.syrin 40 Mg SQ DAILY


Donepezil Hcl 10 Mg Tablet 10 Mg PO HS


Colace (Docusate Sodium) 100 Mg Capsule 100 Mg PO PRN DAILY PRN


Depakote Er (Divalproex Sodium) 500 Mg Tab.er.24h 750 Mg PO HS


Coreg  ** (Carvedilol) 6.25 Mg Tablet 6.25 Mg PO BIDWMEALS


Symbicort 160-4.5 Mcg Inhaler (Budesonide/Formoterol Fumarate) 10.2 Gm 

Hfa.aer.ad 2 Puff IH BID


Lipitor (Atorvastatin Calcium) 20 Mg Tablet 20 Mg PO QHS


Aspirin Ec (Aspirin) 325 Mg Tablet.dr 325 Mg PO DAILY


Amlodipine Besylate 10 Mg Tablet 10 Mg PO DAILY


Albuterol Sulfate Conc Neb Soln (Albuterol Sulfate) 2.5 Mg/0.5 Ml Vial.neb 2.5 

Mg NEB PRN Q6HRS PRN


Acetaminophen 650 Mg/20.3 Ml Solution 650 Mg PO PRN Q6HRS PRN


I have reviewed the current psychotropics carefully including drug 

interactions.  Risk benefit ratio favors no change other than as noted in my 

dictated progress note.





Diagnosis:


Problems:  


(1) Anxiety disorder


(2) Major depressive disorder, recurrent episode


(3) Psychosis, atypical


(4) Impulse control disorder











JULY ZAMUDIO MD Dec 28, 2018 22:53

## 2018-12-28 NOTE — PDOC
Exam


Note:


Marquise Note:


S/O:  This is a late entry of 12/26/2018.  This note covers elements not 

covered in my initial note.  I met with the patient in the evening of 12/26/ 2018.  She slept six and half hours previous night.  She slept in the dayroom 

for about two hours, early to bed in the evening.  BUN 30 with a BUN/creatinine 

ratio of 43.  Valproic acid level 47.





ROS:  Positive for some shortness of breath.  Impaired ambulation in 

wheelchair.  No CV, GI/, ENT, integumentary system symptoms on review.





MSE: Oriented to herself and situation.  Speech coherent, somewhat rapid at 

times.  Abstraction is fair.  Computation impaired.  Language function intact.  

Short-term memory is impaired.  She talked at great length to me about how she 

feels her son is trying to get a car/van and her money and that is all he is 

interested in.   We addressed this at some length.  No active suicidal or 

homicidal ideation but she is quite paranoid but feels helpless that she has no 

other relative that she can turn to.





Labs:  Reviewed.





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  Major 

neurocognitive disorder early Alzheimer, vascular with delusion, depression.  

Rest unchanged.





Plan:  Increase Depakote from 500 mg b.i.d. to 625 mg b.i.d.  Check CBC, CMP, 

valproic acid level in three days.  Continue rest psychotropics unchanged from 

my initial note.





Assessment:


Vital Signs:





 Vital Signs








  Date Time  Temp Pulse Resp B/P (MAP) Pulse Ox O2 Delivery O2 Flow Rate FiO2


 


12/28/18 07:32  68  144/78    


 


12/28/18 06:03 98.7  20  98   


 


12/27/18 20:00      Nasal Cannula 2.0 








I&O











Intake and Output 


 


 12/28/18





 07:01


 


Intake Total 845 ml


 


Balance 845 ml


 


 


 


Intake Oral 845 ml


 


# Bowel Movements 1











Current Medications:


Meds:





Current Medications


Multi-Ingredient Ointment (Analgesic Balm) 1 louis PRN QID  PRN TP MUSCLE PAIN;  

Start 12/21/18 at 18:45


Al Hydroxide/Mg Hydroxide (Mylanta Plus Xs) 15 ml PRN AFTMEALHC  PRN PO 

DYSPEPSIA;  Start 12/21/18 at 18:45


Magnesium Hydroxide (Milk Of Magnesia) 2,400 mg PRN QHS  PRN PO CONSTIPATION;  

Start 12/21/18 at 18:45


Aspirin (Aspirin Enteric Coated) 325 mg DAILY PO  Last administered on 12/28/ 18at 07:32;  Start 12/22/18 at 09:00


Atorvastatin Calcium (Lipitor) 20 mg QHS PO  Last administered on 12/27/18at 19:

27;  Start 12/21/18 at 21:00


Estrogens Conjugated (Premarin) 0.5 louis PRN DAILY  PRN VG vaginal dryness;  

Start 12/21/18 at 19:15


Acetaminophen/ Hydrocodone Bitart (Lortab 5/325) 1 tab PRN Q12HR  PRN PO PAIN 

Last administered on 12/24/18at 09:06;  Start 12/21/18 at 19:15;  Stop 12/24/18 

at 15:02;  Status DC


Ipratropium Bromide (Atrovent) 0.2 mg PRN Q6HRS  PRN IH SHORTNESS OF BREATH;  

Start 12/21/18 at 19:15


Levothyroxine Sodium (Synthroid) 75 mcg DAILYAC PO  Last administered on 12/22/ 18at 08:40;  Start 12/22/18 at 07:30;  Stop 12/22/18 at 12:26;  Status DC


Lisinopril (Prinivil) 10 mg DAILY PO  Last administered on 12/28/18at 07:32;  

Start 12/22/18 at 09:00


Acetaminophen (Tylenol) 650 mg PRN Q6HRS  PRN PO PAIN / TEMP;  Start 12/21/18 

at 19:45


Non-Formulary Medication (Albuterol Sulfate (Albuterol Sulfate Conc Neb Soln)) 

2.5 mg PRN Q6HRS  PRN NEB SHORTNESS OF BREATH;  Start 12/21/18 at 19:15;  

Status UNV


Amlodipine Besylate (Norvasc) 10 mg DAILY PO  Last administered on 12/25/18at 08

:38;  Start 12/22/18 at 09:00;  Stop 12/25/18 at 12:27;  Status DC


Non-Formulary Medication (Budesonide/ Formoterol Fumarate (Symbicort 160-4.5 

Mcg Inhaler)) 2 puff BID IH ;  Start 12/21/18 at 21:00;  Status UNV


Carvedilol (Coreg) 6.25 mg BIDWMEALS PO  Last administered on 12/28/18at 07:31;

  Start 12/21/18 at 20:00


Divalproex Sodium (Depakote Er) 750 mg QHS PO  Last administered on 12/21/18at 

20:53;  Start 12/21/18 at 21:00;  Stop 12/22/18 at 21:13;  Status DC


Docusate Sodium (Colace) 100 mg PRN DAILY  PRN PO HARD STOOLS;  Start 12/21/18 

at 19:45


Donepezil HCl (Aricept) 10 mg QHS PO  Last administered on 12/27/18at 19:27;  

Start 12/21/18 at 21:00


Enoxaparin Sodium (Lovenox 40mg Syringe) 40 mg DAILY SQ  Last administered on 12 /28/18at 07:31;  Start 12/22/18 at 09:00


Hydralazine HCl (Apresoline) 10 mg TID PO  Last administered on 12/25/18at 08:39

;  Start 12/21/18 at 21:00;  Stop 12/25/18 at 12:27;  Status DC


Multivitamins/ Calcium (Thera-M Plus) 1 tab DAILY PO  Last administered on 12/28 /18at 07:32;  Start 12/22/18 at 09:00


Olanzapine (ZyPREXA) 5 mg PRN DAILY  PRN PO ANXIETY/AGITATION Last administered 

on 12/23/18at 10:50;  Start 12/22/18 at 09:00


Pantoprazole Sodium (Protonix) 40 mg DAILYAC PO  Last administered on 12/28/ 18at 07:31;  Start 12/22/18 at 07:30


Phenazopyridine HCl (Pyridium) 100 mg PRN TID  PRN PO URINARY PAIN;  Start 12/21 /18 at 19:45


Polyethylene Glycol (miraLAX) 17 gm PRN BID  PRN PO CONSTIPATION;  Start 12/22/ 18 at 09:00


Sodium Chloride (Saline Mist Nasal) 1 louis QID NS ;  Start 12/21/18 at 21:00;  

Status Cancel


Trazodone HCl (Desyrel) 25 mg PRN BID  PRN PO ANXIETY/AGITATION;  Start 12/21/ 18 at 19:45


Trazodone HCl (Desyrel) 50 mg PRN QHS  PRN PO INSOMNIA;  Start 12/21/18 at 19:45


Lidocaine (Lidoderm) 1 patch DAILY TD  Last administered on 12/28/18at 07:31;  

Start 12/22/18 at 09:00


Multivitamins/ Minerals (I-Irina) 1 tab DAILY PO  Last administered on 12/28/ 18at 07:31;  Start 12/22/18 at 09:00


Miscellaneous (Lidoderm Patch Removal) 1 ea QHS MC  Last administered on 12/27/ 18at 19:27;  Start 12/21/18 at 21:00


Albuterol Sulfate (Ventolin) 2.5 mg PRN Q6HRS  PRN NEB SHORTNESS OF BREATH;  

Start 12/21/18 at 19:45


Albuterol Sulfate (Ventolin) 2.5 mg RTQID NEB  Last administered on 12/27/18at 

20:20;  Start 12/21/18 at 20:00


Budesonide (Pulmicort) 0.5 mg RTBID NEB  Last administered on 12/27/18at 20:20;

  Start 12/21/18 at 20:00


Sodium Chloride (Ayr Saline Nasal) 2 louis QID NS ;  Start 12/21/18 at 21:00;  

Stop 12/22/18 at 13:08;  Status DC


Levothyroxine Sodium (Synthroid) 75 mcg DAILY06 PO  Last administered on 12/28/ 18at 04:55;  Start 12/23/18 at 06:00


Sodium Chloride (Saline Mist Nasal) 1 louis PRN QID  PRN NS NASAL CONGESTION;  

Start 12/22/18 at 13:15


Divalproex Sodium (Depakote Sprinkles) 750 mg HS PO  Last administered on 12/23/ 18at 20:20;  Start 12/22/18 at 21:30;  Stop 12/24/18 at 14:27;  Status DC


Sertraline HCl (Zoloft) 25 mg DAILY PO  Last administered on 12/25/18at 08:39;  

Start 12/23/18 at 09:00;  Stop 12/25/18 at 09:01;  Status DC


Sertraline HCl (Zoloft) 50 mg DAILY PO  Last administered on 12/28/18at 07:32;  

Start 12/26/18 at 09:00


Divalproex Sodium (Depakote Sprinkles) 500 mg BID94 PO  Last administered on 12/ 24/18at 07:55;  Start 12/24/18 at 09:00;  Stop 12/24/18 at 14:27;  Status DC


Quetiapine Fumarate (SEROquel) 12.5 mg 1300 PO  Last administered on 12/27/18at 

14:42;  Start 12/24/18 at 13:00


Divalproex Sodium (Depakote Sprinkles) 500 mg BID PO  Last administered on 12/26 /18at 09:59;  Start 12/24/18 at 21:00;  Stop 12/26/18 at 17:43;  Status DC


Acetaminophen/ Hydrocodone Bitart (Lortab 5/325) 1 tab PRN Q6HRS  PRN PO PAIN 

Last administered on 12/27/18at 12:17;  Start 12/24/18 at 15:15


Olanzapine (ZyPREXA ZYDIS) 2.5 mg PRN Q2HR  PRN PO PSYCHOSIS;  Start 12/24/18 

at 19:15


Divalproex Sodium (Depakote Sprinkles) 625 mg BID PO  Last administered on 12/28 /18at 07:31;  Start 12/26/18 at 21:00





Active Scripts


Active


Reported


[occuvite]   1 Tab PO DAILY


Pyridium (Phenazopyridine Hcl) 100 Mg Tablet 100 Mg PO PRN TID PRN


Trazodone Hcl 50 Mg Tablet 25 Mg PO PRN QHS PRN


Premarin (Estrogens, Conjugated) 30 Gm Cream.appl 0.5 Gm VG PRN DAILY PRN


Multivitamins (Multivitamin) 1 Each Tablet 1 Tab PO DAILY


Trazodone Hcl 50 Mg Tablet 25 Mg PO BID PRN


Ayr Saline (Sodium Chloride) 50 Ml Drops 2 Drop NS QID


Miralax (Polyethylene Glycol 3350) 17 Gm Powd.pack 17 Gm PO PRN BID PRN


Protonix (Pantoprazole Sodium) 40 Mg Tablet.dr 40 Mg PO DAILY


Zyprexa (Olanzapine) 5 Mg Tablet 5 Mg PO PRN DAILY PRN


Lisinopril 10 Mg Tablet 10 Mg PO DAILY


[lidoderm]   1 Patch TD DAILY


Levothyroxine Sodium 75 Mcg Tablet 75 Mcg PO DAILYAC


Ipratropium Bromide 0.2 Mg/1 Ml Solution 0.2 Mg IH PRN Q6HRS PRN


Hydrocodone-Apap 5-325  ** (Hydrocodone Bit/Acetaminophen) 1 Each Tablet 1 Tab 

PO PRN Q12HR PRN


Hydralazine Hcl 10 Mg Tablet 10 Mg PO TID


Lovenox (Enoxaparin Sodium) 40 Mg/0.4 Ml Disp.syrin 40 Mg SQ DAILY


Donepezil Hcl 10 Mg Tablet 10 Mg PO HS


Colace (Docusate Sodium) 100 Mg Capsule 100 Mg PO PRN DAILY PRN


Depakote Er (Divalproex Sodium) 500 Mg Tab.er.24h 750 Mg PO HS


Coreg  ** (Carvedilol) 6.25 Mg Tablet 6.25 Mg PO BIDWMEALS


Symbicort 160-4.5 Mcg Inhaler (Budesonide/Formoterol Fumarate) 10.2 Gm 

Hfa.aer.ad 2 Puff IH BID


Lipitor (Atorvastatin Calcium) 20 Mg Tablet 20 Mg PO QHS


Aspirin Ec (Aspirin) 325 Mg Tablet.dr 325 Mg PO DAILY


Amlodipine Besylate 10 Mg Tablet 10 Mg PO DAILY


Albuterol Sulfate Conc Neb Soln (Albuterol Sulfate) 2.5 Mg/0.5 Ml Vial.neb 2.5 

Mg NEB PRN Q6HRS PRN


Acetaminophen 650 Mg/20.3 Ml Solution 650 Mg PO PRN Q6HRS PRN


I have reviewed the current psychotropics carefully including drug 

interactions.  Risk benefit ratio favors no change other than as noted in my 

dictated progress note.





Diagnosis:


Problems:  


(1) Anxiety disorder


(2) Major depressive disorder, recurrent episode


(3) Psychosis, atypical


(4) Impulse control disorder











JULY ZAMUDIO MD Dec 28, 2018 08:00

## 2018-12-29 VITALS — SYSTOLIC BLOOD PRESSURE: 100 MMHG | DIASTOLIC BLOOD PRESSURE: 62 MMHG

## 2018-12-29 VITALS — SYSTOLIC BLOOD PRESSURE: 130 MMHG | DIASTOLIC BLOOD PRESSURE: 75 MMHG

## 2018-12-29 LAB
ALBUMIN SERPL-MCNC: 2.5 G/DL (ref 3.4–5)
ALBUMIN/GLOB SERPL: 0.8 {RATIO} (ref 1–1.7)
ALP SERPL-CCNC: 39 U/L (ref 46–116)
ALT SERPL-CCNC: 22 U/L (ref 14–59)
ANION GAP SERPL CALC-SCNC: 6 MMOL/L (ref 6–14)
AST SERPL-CCNC: 15 U/L (ref 15–37)
BASOPHILS # BLD AUTO: 0 X10^3/UL (ref 0–0.2)
BASOPHILS NFR BLD: 1 % (ref 0–3)
BILIRUB SERPL-MCNC: 0.4 MG/DL (ref 0.2–1)
BUN/CREAT SERPL: 24 (ref 6–20)
CA-I SERPL ISE-MCNC: 19 MG/DL (ref 7–20)
CALCIUM SERPL-MCNC: 8.4 MG/DL (ref 8.5–10.1)
CHLORIDE SERPL-SCNC: 103 MMOL/L (ref 98–107)
CO2 SERPL-SCNC: 33 MMOL/L (ref 21–32)
CREAT SERPL-MCNC: 0.8 MG/DL (ref 0.6–1)
EOSINOPHIL NFR BLD: 0.2 X10^3/UL (ref 0–0.7)
EOSINOPHIL NFR BLD: 4 % (ref 0–3)
ERYTHROCYTE [DISTWIDTH] IN BLOOD BY AUTOMATED COUNT: 15.4 % (ref 11.5–14.5)
GFR SERPLBLD BASED ON 1.73 SQ M-ARVRAT: 67.8 ML/MIN
GLOBULIN SER-MCNC: 3.1 G/DL (ref 2.2–3.8)
GLUCOSE SERPL-MCNC: 83 MG/DL (ref 70–99)
HCT VFR BLD CALC: 35.8 % (ref 36–47)
HGB BLD-MCNC: 11.9 G/DL (ref 12–15.5)
LYMPHOCYTES # BLD: 1.5 X10^3/UL (ref 1–4.8)
LYMPHOCYTES NFR BLD AUTO: 35 % (ref 24–48)
MCH RBC QN AUTO: 31 PG (ref 25–35)
MCHC RBC AUTO-ENTMCNC: 33 G/DL (ref 31–37)
MCV RBC AUTO: 94 FL (ref 79–100)
MONO #: 0.7 X10^3/UL (ref 0–1.1)
MONOCYTES NFR BLD: 15 % (ref 0–9)
NEUT #: 2 X10^3UL (ref 1.8–7.7)
NEUTROPHILS NFR BLD AUTO: 45 % (ref 31–73)
PLATELET # BLD AUTO: 169 X10^3/UL (ref 140–400)
POTASSIUM SERPL-SCNC: 4.2 MMOL/L (ref 3.5–5.1)
PROT SERPL-MCNC: 5.6 G/DL (ref 6.4–8.2)
RBC # BLD AUTO: 3.82 X10^6/UL (ref 3.5–5.4)
SODIUM SERPL-SCNC: 142 MMOL/L (ref 136–145)
VAL ACID: 54 MCG/ML (ref 50–100)
WBC # BLD AUTO: 4.3 X10^3/UL (ref 4–11)

## 2018-12-29 RX ADMIN — DIVALPROEX SODIUM SCH MG: 125 CAPSULE, COATED PELLETS ORAL at 07:31

## 2018-12-29 RX ADMIN — BUDESONIDE SCH MG: 0.5 SUSPENSION RESPIRATORY (INHALATION) at 21:21

## 2018-12-29 RX ADMIN — PANTOPRAZOLE SODIUM SCH MG: 40 TABLET, DELAYED RELEASE ORAL at 07:31

## 2018-12-29 RX ADMIN — ALBUTEROL SULFATE SCH MG: 108 AEROSOL, METERED RESPIRATORY (INHALATION) at 10:08

## 2018-12-29 RX ADMIN — HYDROCODONE BITARTRATE AND ACETAMINOPHEN PRN TAB: 5; 325 TABLET ORAL at 12:09

## 2018-12-29 RX ADMIN — DIVALPROEX SODIUM SCH MG: 125 CAPSULE, COATED PELLETS ORAL at 19:56

## 2018-12-29 RX ADMIN — CARVEDILOL SCH MG: 6.25 TABLET, FILM COATED ORAL at 07:31

## 2018-12-29 RX ADMIN — ALBUTEROL SULFATE SCH MG: 108 AEROSOL, METERED RESPIRATORY (INHALATION) at 16:05

## 2018-12-29 RX ADMIN — ENOXAPARIN SODIUM SCH MG: 100 INJECTION SUBCUTANEOUS at 07:30

## 2018-12-29 RX ADMIN — ALBUTEROL SULFATE SCH MG: 108 AEROSOL, METERED RESPIRATORY (INHALATION) at 21:21

## 2018-12-29 RX ADMIN — DONEPEZIL HYDROCHLORIDE SCH MG: 10 TABLET ORAL at 19:56

## 2018-12-29 RX ADMIN — Medication SCH EA: at 19:56

## 2018-12-29 RX ADMIN — BUDESONIDE SCH MG: 0.5 SUSPENSION RESPIRATORY (INHALATION) at 10:07

## 2018-12-29 RX ADMIN — LIDOCAINE SCH PATCH: 50 PATCH CUTANEOUS at 07:30

## 2018-12-29 RX ADMIN — SERTRALINE SCH MG: 25 TABLET, FILM COATED ORAL at 07:31

## 2018-12-29 RX ADMIN — ATORVASTATIN CALCIUM SCH MG: 20 TABLET, FILM COATED ORAL at 19:56

## 2018-12-29 RX ADMIN — MULTIPLE VITAMINS W/ MINERALS TAB SCH TAB: TAB at 07:31

## 2018-12-29 RX ADMIN — I-VITE, TAB 1000-60-2MG (60/BT) SCH TAB: TAB at 07:30

## 2018-12-29 RX ADMIN — LEVOTHYROXINE SODIUM SCH MCG: 75 TABLET ORAL at 04:56

## 2018-12-29 RX ADMIN — HYDROCODONE BITARTRATE AND ACETAMINOPHEN PRN TAB: 5; 325 TABLET ORAL at 18:32

## 2018-12-29 RX ADMIN — ALBUTEROL SULFATE SCH MG: 108 AEROSOL, METERED RESPIRATORY (INHALATION) at 04:54

## 2018-12-29 RX ADMIN — LISINOPRIL SCH MG: 10 TABLET ORAL at 07:30

## 2018-12-29 RX ADMIN — ASPIRIN SCH MG: 325 TABLET, DELAYED RELEASE ORAL at 07:30

## 2018-12-29 RX ADMIN — QUETIAPINE FUMARATE SCH MG: 25 TABLET, FILM COATED ORAL at 14:08

## 2018-12-29 RX ADMIN — CARVEDILOL SCH MG: 6.25 TABLET, FILM COATED ORAL at 17:34

## 2018-12-29 NOTE — PDOC
Exam


Note:


Marquise Note:


Please also refer to the separate dictated note~for this date of service 

dictated separately.~Patient seen individually. Discussed the patient with 

Nursing staff reviewed the chart.~Reviewed interim history and current 

functioning. Reviewed vital signs,~Labs/ Radiology~and current medications 

noted below. Continue current treatment with the changes noted in the dictated 

addendum note





Assessment:


Vital Signs:





 Vital Signs








  Date Time  Temp Pulse Resp B/P (MAP) Pulse Ox O2 Delivery O2 Flow Rate FiO2


 


12/29/18 21:20     96 Nasal Cannula 2.0 


 


12/29/18 17:34  62  130/75    


 


12/29/18 15:46 98.3  20     








I&O











Intake and Output 


 


 12/29/18





 07:01


 


Intake Total 903 ml


 


Balance 903 ml


 


 


 


Intake Oral 903 ml


 


# Bowel Movements 2








Labs:





 Laboratory Tests








Test


 12/29/18


06:51


 


White Blood Count


 4.3 x10^3/uL


(4.0-11.0)


 


Red Blood Count


 3.82 x10^6/uL


(3.50-5.40)


 


Hemoglobin


 11.9 g/dL


(12.0-15.5)  L


 


Hematocrit


 35.8 %


(36.0-47.0)  L


 


Mean Corpuscular Volume


 94 fL ()





 


Mean Corpuscular Hemoglobin 31 pg (25-35)  


 


Mean Corpuscular Hemoglobin


Concent 33 g/dL


(31-37)


 


Red Cell Distribution Width


 15.4 %


(11.5-14.5)  H


 


Platelet Count


 169 x10^3/uL


(140-400)


 


Neutrophils (%) (Auto) 45 % (31-73)  


 


Lymphocytes (%) (Auto) 35 % (24-48)  


 


Monocytes (%) (Auto) 15 % (0-9)  H


 


Eosinophils (%) (Auto) 4 % (0-3)  H


 


Basophils (%) (Auto) 1 % (0-3)  


 


Neutrophils # (Auto)


 2.0 x10^3uL


(1.8-7.7)


 


Lymphocytes # (Auto)


 1.5 x10^3/uL


(1.0-4.8)


 


Monocytes # (Auto)


 0.7 x10^3/uL


(0.0-1.1)


 


Eosinophils # (Auto)


 0.2 x10^3/uL


(0.0-0.7)


 


Basophils # (Auto)


 0.0 x10^3/uL


(0.0-0.2)


 


Sodium Level


 142 mmol/L


(136-145)


 


Potassium Level


 4.2 mmol/L


(3.5-5.1)


 


Chloride Level


 103 mmol/L


()


 


Carbon Dioxide Level


 33 mmol/L


(21-32)  H


 


Anion Gap 6 (6-14)  


 


Blood Urea Nitrogen


 19 mg/dL


(7-20)


 


Creatinine


 0.8 mg/dL


(0.6-1.0)


 


Estimated GFR


(Cockcroft-Gault) 67.8  





 


BUN/Creatinine Ratio 24 (6-20)  H


 


Glucose Level


 83 mg/dL


(70-99)


 


Calcium Level


 8.4 mg/dL


(8.5-10.1)  L


 


Magnesium Level


 2.0 mg/dL


(1.8-2.4)


 


Total Bilirubin


 0.4 mg/dL


(0.2-1.0)


 


Aspartate Amino Transferase


(AST) 15 U/L (15-37)





 


Alanine Aminotransferase (ALT)


 22 U/L (14-59)





 


Alkaline Phosphatase


 39 U/L


()  L


 


Total Protein


 5.6 g/dL


(6.4-8.2)  L


 


Albumin


 2.5 g/dL


(3.4-5.0)  L


 


Albumin/Globulin Ratio


 0.8 (1.0-1.7)


L


 


Valproic Acid Level


 54 mcg/mL


()


 


Valproic Acid Last Dose Date 12/28/18  


 


Valproic Acid Last Dose Time 2100  











Current Medications:


Meds:





Current Medications


Multi-Ingredient Ointment (Analgesic Balm) 1 louis PRN QID  PRN TP MUSCLE PAIN;  

Start 12/21/18 at 18:45


Al Hydroxide/Mg Hydroxide (Mylanta Plus Xs) 15 ml PRN AFTMEALHC  PRN PO 

DYSPEPSIA;  Start 12/21/18 at 18:45


Magnesium Hydroxide (Milk Of Magnesia) 2,400 mg PRN QHS  PRN PO CONSTIPATION;  

Start 12/21/18 at 18:45


Aspirin (Aspirin Enteric Coated) 325 mg DAILY PO  Last administered on 12/29/ 18at 07:30;  Start 12/22/18 at 09:00


Atorvastatin Calcium (Lipitor) 20 mg QHS PO  Last administered on 12/29/18at 19:

56;  Start 12/21/18 at 21:00


Estrogens Conjugated (Premarin) 0.5 louis PRN DAILY  PRN VG vaginal dryness;  

Start 12/21/18 at 19:15


Acetaminophen/ Hydrocodone Bitart (Lortab 5/325) 1 tab PRN Q12HR  PRN PO PAIN 

Last administered on 12/24/18at 09:06;  Start 12/21/18 at 19:15;  Stop 12/24/18 

at 15:02;  Status DC


Ipratropium Bromide (Atrovent) 0.2 mg PRN Q6HRS  PRN IH SHORTNESS OF BREATH;  

Start 12/21/18 at 19:15


Levothyroxine Sodium (Synthroid) 75 mcg DAILYAC PO  Last administered on 12/22/ 18at 08:40;  Start 12/22/18 at 07:30;  Stop 12/22/18 at 12:26;  Status DC


Lisinopril (Prinivil) 10 mg DAILY PO  Last administered on 12/29/18at 07:30;  

Start 12/22/18 at 09:00


Acetaminophen (Tylenol) 650 mg PRN Q6HRS  PRN PO PAIN / TEMP Last administered 

on 12/28/18at 20:03;  Start 12/21/18 at 19:45


Non-Formulary Medication (Albuterol Sulfate (Albuterol Sulfate Conc Neb Soln)) 

2.5 mg PRN Q6HRS  PRN NEB SHORTNESS OF BREATH;  Start 12/21/18 at 19:15;  

Status UNV


Amlodipine Besylate (Norvasc) 10 mg DAILY PO  Last administered on 12/25/18at 08

:38;  Start 12/22/18 at 09:00;  Stop 12/25/18 at 12:27;  Status DC


Non-Formulary Medication (Budesonide/ Formoterol Fumarate (Symbicort 160-4.5 

Mcg Inhaler)) 2 puff BID IH ;  Start 12/21/18 at 21:00;  Status UNV


Carvedilol (Coreg) 6.25 mg BIDWMEALS PO  Last administered on 12/29/18at 17:34;

  Start 12/21/18 at 20:00


Divalproex Sodium (Depakote Er) 750 mg QHS PO  Last administered on 12/21/18at 

20:53;  Start 12/21/18 at 21:00;  Stop 12/22/18 at 21:13;  Status DC


Docusate Sodium (Colace) 100 mg PRN DAILY  PRN PO HARD STOOLS;  Start 12/21/18 

at 19:45


Donepezil HCl (Aricept) 10 mg QHS PO  Last administered on 12/29/18at 19:56;  

Start 12/21/18 at 21:00


Enoxaparin Sodium (Lovenox 40mg Syringe) 40 mg DAILY SQ  Last administered on 12 /29/18at 07:30;  Start 12/22/18 at 09:00


Hydralazine HCl (Apresoline) 10 mg TID PO  Last administered on 12/25/18at 08:39

;  Start 12/21/18 at 21:00;  Stop 12/25/18 at 12:27;  Status DC


Multivitamins/ Calcium (Thera-M Plus) 1 tab DAILY PO  Last administered on 12/29 /18at 07:31;  Start 12/22/18 at 09:00


Olanzapine (ZyPREXA) 5 mg PRN DAILY  PRN PO ANXIETY/AGITATION Last administered 

on 12/23/18at 10:50;  Start 12/22/18 at 09:00


Pantoprazole Sodium (Protonix) 40 mg DAILYAC PO  Last administered on 12/29/ 18at 07:31;  Start 12/22/18 at 07:30


Phenazopyridine HCl (Pyridium) 100 mg PRN TID  PRN PO URINARY PAIN;  Start 12/21 /18 at 19:45


Polyethylene Glycol (miraLAX) 17 gm PRN BID  PRN PO CONSTIPATION;  Start 12/22/ 18 at 09:00


Sodium Chloride (Saline Mist Nasal) 1 louis QID NS ;  Start 12/21/18 at 21:00;  

Status Cancel


Trazodone HCl (Desyrel) 25 mg PRN BID  PRN PO ANXIETY/AGITATION;  Start 12/21/ 18 at 19:45


Trazodone HCl (Desyrel) 50 mg PRN QHS  PRN PO INSOMNIA;  Start 12/21/18 at 19:45


Lidocaine (Lidoderm) 1 patch DAILY TD  Last administered on 12/29/18at 07:30;  

Start 12/22/18 at 09:00


Multivitamins/ Minerals (I-Irina) 1 tab DAILY PO  Last administered on 12/29/ 18at 07:30;  Start 12/22/18 at 09:00


Miscellaneous (Lidoderm Patch Removal) 1 ea QHS MC  Last administered on 12/29/ 18at 19:56;  Start 12/21/18 at 21:00


Albuterol Sulfate (Ventolin) 2.5 mg PRN Q6HRS  PRN NEB SHORTNESS OF BREATH;  

Start 12/21/18 at 19:45


Albuterol Sulfate (Ventolin) 2.5 mg RTQID NEB  Last administered on 12/29/18at 

21:21;  Start 12/21/18 at 20:00


Budesonide (Pulmicort) 0.5 mg RTBID NEB  Last administered on 12/29/18at 21:21;

  Start 12/21/18 at 20:00


Sodium Chloride (Ayr Saline Nasal) 2 louis QID NS ;  Start 12/21/18 at 21:00;  

Stop 12/22/18 at 13:08;  Status DC


Levothyroxine Sodium (Synthroid) 75 mcg DAILY06 PO  Last administered on 12/29/ 18at 04:56;  Start 12/23/18 at 06:00


Sodium Chloride (Saline Mist Nasal) 1 louis PRN QID  PRN NS NASAL CONGESTION;  

Start 12/22/18 at 13:15


Divalproex Sodium (Depakote Sprinkles) 750 mg HS PO  Last administered on 12/23/ 18at 20:20;  Start 12/22/18 at 21:30;  Stop 12/24/18 at 14:27;  Status DC


Sertraline HCl (Zoloft) 25 mg DAILY PO  Last administered on 12/25/18at 08:39;  

Start 12/23/18 at 09:00;  Stop 12/25/18 at 09:01;  Status DC


Sertraline HCl (Zoloft) 50 mg DAILY PO  Last administered on 12/29/18at 07:31;  

Start 12/26/18 at 09:00


Divalproex Sodium (Depakote Sprinkles) 500 mg BID94 PO  Last administered on 12/ 24/18at 07:55;  Start 12/24/18 at 09:00;  Stop 12/24/18 at 14:27;  Status DC


Quetiapine Fumarate (SEROquel) 12.5 mg 1300 PO  Last administered on 12/29/18at 

14:08;  Start 12/24/18 at 13:00


Divalproex Sodium (Depakote Sprinkles) 500 mg BID PO  Last administered on 12/26 /18at 09:59;  Start 12/24/18 at 21:00;  Stop 12/26/18 at 17:43;  Status DC


Acetaminophen/ Hydrocodone Bitart (Lortab 5/325) 1 tab PRN Q6HRS  PRN PO PAIN 

Last administered on 12/29/18at 18:32;  Start 12/24/18 at 15:15


Olanzapine (ZyPREXA ZYDIS) 2.5 mg PRN Q2HR  PRN PO PSYCHOSIS;  Start 12/24/18 

at 19:15


Divalproex Sodium (Depakote Sprinkles) 625 mg BID PO  Last administered on 12/29 /18at 19:56;  Start 12/26/18 at 21:00





Active Scripts


Active


Reported


[occuvite]   1 Tab PO DAILY


Pyridium (Phenazopyridine Hcl) 100 Mg Tablet 100 Mg PO PRN TID PRN


Trazodone Hcl 50 Mg Tablet 25 Mg PO PRN QHS PRN


Premarin (Estrogens, Conjugated) 30 Gm Cream.appl 0.5 Gm VG PRN DAILY PRN


Multivitamins (Multivitamin) 1 Each Tablet 1 Tab PO DAILY


Trazodone Hcl 50 Mg Tablet 25 Mg PO BID PRN


Ayr Saline (Sodium Chloride) 50 Ml Drops 2 Drop NS QID


Miralax (Polyethylene Glycol 3350) 17 Gm Powd.pack 17 Gm PO PRN BID PRN


Protonix (Pantoprazole Sodium) 40 Mg Tablet.dr 40 Mg PO DAILY


Zyprexa (Olanzapine) 5 Mg Tablet 5 Mg PO PRN DAILY PRN


Lisinopril 10 Mg Tablet 10 Mg PO DAILY


[lidoderm]   1 Patch TD DAILY


Levothyroxine Sodium 75 Mcg Tablet 75 Mcg PO DAILYAC


Ipratropium Bromide 0.2 Mg/1 Ml Solution 0.2 Mg IH PRN Q6HRS PRN


Hydrocodone-Apap 5-325  ** (Hydrocodone Bit/Acetaminophen) 1 Each Tablet 1 Tab 

PO PRN Q12HR PRN


Hydralazine Hcl 10 Mg Tablet 10 Mg PO TID


Lovenox (Enoxaparin Sodium) 40 Mg/0.4 Ml Disp.syrin 40 Mg SQ DAILY


Donepezil Hcl 10 Mg Tablet 10 Mg PO HS


Colace (Docusate Sodium) 100 Mg Capsule 100 Mg PO PRN DAILY PRN


Depakote Er (Divalproex Sodium) 500 Mg Tab.er.24h 750 Mg PO HS


Coreg  ** (Carvedilol) 6.25 Mg Tablet 6.25 Mg PO BIDWMEALS


Symbicort 160-4.5 Mcg Inhaler (Budesonide/Formoterol Fumarate) 10.2 Gm 

Hfa.aer.ad 2 Puff IH BID


Lipitor (Atorvastatin Calcium) 20 Mg Tablet 20 Mg PO QHS


Aspirin Ec (Aspirin) 325 Mg Tablet.dr 325 Mg PO DAILY


Amlodipine Besylate 10 Mg Tablet 10 Mg PO DAILY


Albuterol Sulfate Conc Neb Soln (Albuterol Sulfate) 2.5 Mg/0.5 Ml Vial.neb 2.5 

Mg NEB PRN Q6HRS PRN


Acetaminophen 650 Mg/20.3 Ml Solution 650 Mg PO PRN Q6HRS PRN


I have reviewed the current psychotropics carefully including drug 

interactions.  Risk benefit ratio favors no change other than as noted in my 

dictated progress note.





Diagnosis:


Problems:  


(1) Anxiety disorder


(2) Major depressive disorder, recurrent episode


(3) Psychosis, atypical


(4) Impulse control disorder











JULY ZAMUDIO MD Dec 29, 2018 22:03

## 2018-12-30 VITALS — DIASTOLIC BLOOD PRESSURE: 77 MMHG | SYSTOLIC BLOOD PRESSURE: 122 MMHG

## 2018-12-30 VITALS — DIASTOLIC BLOOD PRESSURE: 74 MMHG | SYSTOLIC BLOOD PRESSURE: 127 MMHG

## 2018-12-30 RX ADMIN — ALBUTEROL SULFATE SCH MG: 108 AEROSOL, METERED RESPIRATORY (INHALATION) at 20:37

## 2018-12-30 RX ADMIN — Medication SCH EA: at 19:32

## 2018-12-30 RX ADMIN — MULTIPLE VITAMINS W/ MINERALS TAB SCH TAB: TAB at 07:29

## 2018-12-30 RX ADMIN — LIDOCAINE SCH PATCH: 50 PATCH CUTANEOUS at 07:29

## 2018-12-30 RX ADMIN — ASPIRIN SCH MG: 325 TABLET, DELAYED RELEASE ORAL at 07:30

## 2018-12-30 RX ADMIN — HYDROCODONE BITARTRATE AND ACETAMINOPHEN PRN TAB: 5; 325 TABLET ORAL at 01:39

## 2018-12-30 RX ADMIN — CARVEDILOL SCH MG: 6.25 TABLET, FILM COATED ORAL at 17:02

## 2018-12-30 RX ADMIN — DIVALPROEX SODIUM SCH MG: 125 CAPSULE, COATED PELLETS ORAL at 19:32

## 2018-12-30 RX ADMIN — DIVALPROEX SODIUM SCH MG: 125 CAPSULE, COATED PELLETS ORAL at 07:29

## 2018-12-30 RX ADMIN — QUETIAPINE FUMARATE SCH MG: 25 TABLET, FILM COATED ORAL at 14:06

## 2018-12-30 RX ADMIN — BUDESONIDE SCH MG: 0.5 SUSPENSION RESPIRATORY (INHALATION) at 20:37

## 2018-12-30 RX ADMIN — SERTRALINE SCH MG: 25 TABLET, FILM COATED ORAL at 07:30

## 2018-12-30 RX ADMIN — LEVOTHYROXINE SODIUM SCH MCG: 75 TABLET ORAL at 06:12

## 2018-12-30 RX ADMIN — ALBUTEROL SULFATE SCH MG: 108 AEROSOL, METERED RESPIRATORY (INHALATION) at 16:14

## 2018-12-30 RX ADMIN — ATORVASTATIN CALCIUM SCH MG: 20 TABLET, FILM COATED ORAL at 19:32

## 2018-12-30 RX ADMIN — PANTOPRAZOLE SODIUM SCH MG: 40 TABLET, DELAYED RELEASE ORAL at 07:30

## 2018-12-30 RX ADMIN — LISINOPRIL SCH MG: 10 TABLET ORAL at 07:30

## 2018-12-30 RX ADMIN — ALBUTEROL SULFATE SCH MG: 108 AEROSOL, METERED RESPIRATORY (INHALATION) at 10:14

## 2018-12-30 RX ADMIN — DONEPEZIL HYDROCHLORIDE SCH MG: 10 TABLET ORAL at 19:32

## 2018-12-30 RX ADMIN — CARVEDILOL SCH MG: 6.25 TABLET, FILM COATED ORAL at 07:31

## 2018-12-30 RX ADMIN — BUDESONIDE SCH MG: 0.5 SUSPENSION RESPIRATORY (INHALATION) at 10:15

## 2018-12-30 RX ADMIN — HYDROCODONE BITARTRATE AND ACETAMINOPHEN PRN TAB: 5; 325 TABLET ORAL at 10:47

## 2018-12-30 RX ADMIN — I-VITE, TAB 1000-60-2MG (60/BT) SCH TAB: TAB at 07:30

## 2018-12-30 RX ADMIN — ALBUTEROL SULFATE SCH MG: 108 AEROSOL, METERED RESPIRATORY (INHALATION) at 05:42

## 2018-12-30 RX ADMIN — ENOXAPARIN SODIUM SCH MG: 100 INJECTION SUBCUTANEOUS at 07:28

## 2018-12-30 NOTE — PDOC
Exam


Note:


Marquise Note:


Please also refer to the separate dictated note~for this date of service 

dictated separately.~Patient seen individually. Discussed the patient with 

Nursing staff reviewed the chart.~Reviewed interim history and current 

functioning. Reviewed vital signs,~Labs/ Radiology~and current medications 

noted below. Continue current treatment with the changes noted in the dictated 

addendum note





Assessment:


Vital Signs:





 Vital Signs








  Date Time  Temp Pulse Resp B/P (MAP) Pulse Ox O2 Delivery O2 Flow Rate FiO2


 


12/30/18 20:35     95 Nasal Cannula 2.0 


 


12/30/18 17:02  74  127/74    


 


12/30/18 16:13 97.8  20     








I&O











Intake and Output 


 


 12/30/18





 07:01


 


Intake Total 1560 ml


 


Balance 1560 ml


 


 


 


Intake Oral 1560 ml











Current Medications:


Meds:





Current Medications


Multi-Ingredient Ointment (Analgesic Balm) 1 louis PRN QID  PRN TP MUSCLE PAIN;  

Start 12/21/18 at 18:45


Al Hydroxide/Mg Hydroxide (Mylanta Plus Xs) 15 ml PRN AFTMEALHC  PRN PO 

DYSPEPSIA;  Start 12/21/18 at 18:45


Magnesium Hydroxide (Milk Of Magnesia) 2,400 mg PRN QHS  PRN PO CONSTIPATION;  

Start 12/21/18 at 18:45


Aspirin (Aspirin Enteric Coated) 325 mg DAILY PO  Last administered on 12/30/ 18at 07:30;  Start 12/22/18 at 09:00


Atorvastatin Calcium (Lipitor) 20 mg QHS PO  Last administered on 12/30/18at 19:

32;  Start 12/21/18 at 21:00


Estrogens Conjugated (Premarin) 0.5 oluis PRN DAILY  PRN VG vaginal dryness;  

Start 12/21/18 at 19:15


Acetaminophen/ Hydrocodone Bitart (Lortab 5/325) 1 tab PRN Q12HR  PRN PO PAIN 

Last administered on 12/24/18at 09:06;  Start 12/21/18 at 19:15;  Stop 12/24/18 

at 15:02;  Status DC


Ipratropium Bromide (Atrovent) 0.2 mg PRN Q6HRS  PRN IH SHORTNESS OF BREATH;  

Start 12/21/18 at 19:15


Levothyroxine Sodium (Synthroid) 75 mcg DAILYAC PO  Last administered on 12/22/ 18at 08:40;  Start 12/22/18 at 07:30;  Stop 12/22/18 at 12:26;  Status DC


Lisinopril (Prinivil) 10 mg DAILY PO  Last administered on 12/30/18at 07:30;  

Start 12/22/18 at 09:00


Acetaminophen (Tylenol) 650 mg PRN Q6HRS  PRN PO PAIN / TEMP Last administered 

on 12/28/18at 20:03;  Start 12/21/18 at 19:45


Non-Formulary Medication (Albuterol Sulfate (Albuterol Sulfate Conc Neb Soln)) 

2.5 mg PRN Q6HRS  PRN NEB SHORTNESS OF BREATH;  Start 12/21/18 at 19:15;  

Status UNV


Amlodipine Besylate (Norvasc) 10 mg DAILY PO  Last administered on 12/25/18at 08

:38;  Start 12/22/18 at 09:00;  Stop 12/25/18 at 12:27;  Status DC


Non-Formulary Medication (Budesonide/ Formoterol Fumarate (Symbicort 160-4.5 

Mcg Inhaler)) 2 puff BID IH ;  Start 12/21/18 at 21:00;  Status UNV


Carvedilol (Coreg) 6.25 mg BIDWMEALS PO  Last administered on 12/30/18at 17:02;

  Start 12/21/18 at 20:00


Divalproex Sodium (Depakote Er) 750 mg QHS PO  Last administered on 12/21/18at 

20:53;  Start 12/21/18 at 21:00;  Stop 12/22/18 at 21:13;  Status DC


Docusate Sodium (Colace) 100 mg PRN DAILY  PRN PO HARD STOOLS;  Start 12/21/18 

at 19:45


Donepezil HCl (Aricept) 10 mg QHS PO  Last administered on 12/30/18at 19:32;  

Start 12/21/18 at 21:00


Enoxaparin Sodium (Lovenox 40mg Syringe) 40 mg DAILY SQ  Last administered on 12 /30/18at 07:28;  Start 12/22/18 at 09:00


Hydralazine HCl (Apresoline) 10 mg TID PO  Last administered on 12/25/18at 08:39

;  Start 12/21/18 at 21:00;  Stop 12/25/18 at 12:27;  Status DC


Multivitamins/ Calcium (Thera-M Plus) 1 tab DAILY PO  Last administered on 12/30 /18at 07:29;  Start 12/22/18 at 09:00


Olanzapine (ZyPREXA) 5 mg PRN DAILY  PRN PO ANXIETY/AGITATION Last administered 

on 12/23/18at 10:50;  Start 12/22/18 at 09:00


Pantoprazole Sodium (Protonix) 40 mg DAILYAC PO  Last administered on 12/30/ 18at 07:30;  Start 12/22/18 at 07:30


Phenazopyridine HCl (Pyridium) 100 mg PRN TID  PRN PO URINARY PAIN;  Start 12/21 /18 at 19:45


Polyethylene Glycol (miraLAX) 17 gm PRN BID  PRN PO CONSTIPATION;  Start 12/22/ 18 at 09:00


Sodium Chloride (Saline Mist Nasal) 1 louis QID NS ;  Start 12/21/18 at 21:00;  

Status Cancel


Trazodone HCl (Desyrel) 25 mg PRN BID  PRN PO ANXIETY/AGITATION;  Start 12/21/ 18 at 19:45


Trazodone HCl (Desyrel) 50 mg PRN QHS  PRN PO INSOMNIA;  Start 12/21/18 at 19:45


Lidocaine (Lidoderm) 1 patch DAILY TD  Last administered on 12/30/18at 07:29;  

Start 12/22/18 at 09:00


Multivitamins/ Minerals (I-Irina) 1 tab DAILY PO  Last administered on 12/30/ 18at 07:30;  Start 12/22/18 at 09:00


Miscellaneous (Lidoderm Patch Removal) 1 ea QHS MC  Last administered on 12/30/ 18at 19:32;  Start 12/21/18 at 21:00


Albuterol Sulfate (Ventolin) 2.5 mg PRN Q6HRS  PRN NEB SHORTNESS OF BREATH;  

Start 12/21/18 at 19:45


Albuterol Sulfate (Ventolin) 2.5 mg RTQID NEB  Last administered on 12/30/18at 

20:37;  Start 12/21/18 at 20:00


Budesonide (Pulmicort) 0.5 mg RTBID NEB  Last administered on 12/30/18at 20:37;

  Start 12/21/18 at 20:00


Sodium Chloride (Ayr Saline Nasal) 2 louis QID NS ;  Start 12/21/18 at 21:00;  

Stop 12/22/18 at 13:08;  Status DC


Levothyroxine Sodium (Synthroid) 75 mcg DAILY06 PO  Last administered on 12/30/ 18at 06:12;  Start 12/23/18 at 06:00


Sodium Chloride (Saline Mist Nasal) 1 louis PRN QID  PRN NS NASAL CONGESTION;  

Start 12/22/18 at 13:15


Divalproex Sodium (Depakote Sprinkles) 750 mg HS PO  Last administered on 12/23/ 18at 20:20;  Start 12/22/18 at 21:30;  Stop 12/24/18 at 14:27;  Status DC


Sertraline HCl (Zoloft) 25 mg DAILY PO  Last administered on 12/25/18at 08:39;  

Start 12/23/18 at 09:00;  Stop 12/25/18 at 09:01;  Status DC


Sertraline HCl (Zoloft) 50 mg DAILY PO  Last administered on 12/30/18at 07:30;  

Start 12/26/18 at 09:00


Divalproex Sodium (Depakote Sprinkles) 500 mg BID94 PO  Last administered on 12/ 24/18at 07:55;  Start 12/24/18 at 09:00;  Stop 12/24/18 at 14:27;  Status DC


Quetiapine Fumarate (SEROquel) 12.5 mg 1300 PO  Last administered on 12/30/18at 

14:06;  Start 12/24/18 at 13:00


Divalproex Sodium (Depakote Sprinkles) 500 mg BID PO  Last administered on 12/26 /18at 09:59;  Start 12/24/18 at 21:00;  Stop 12/26/18 at 17:43;  Status DC


Acetaminophen/ Hydrocodone Bitart (Lortab 5/325) 1 tab PRN Q6HRS  PRN PO PAIN 

Last administered on 12/30/18at 10:47;  Start 12/24/18 at 15:15


Olanzapine (ZyPREXA ZYDIS) 2.5 mg PRN Q2HR  PRN PO PSYCHOSIS;  Start 12/24/18 

at 19:15


Divalproex Sodium (Depakote Sprinkles) 625 mg BID PO  Last administered on 12/30 /18at 19:32;  Start 12/26/18 at 21:00





Active Scripts


Active


Reported


[occuvite]   1 Tab PO DAILY


Pyridium (Phenazopyridine Hcl) 100 Mg Tablet 100 Mg PO PRN TID PRN


Trazodone Hcl 50 Mg Tablet 25 Mg PO PRN QHS PRN


Premarin (Estrogens, Conjugated) 30 Gm Cream.appl 0.5 Gm VG PRN DAILY PRN


Multivitamins (Multivitamin) 1 Each Tablet 1 Tab PO DAILY


Trazodone Hcl 50 Mg Tablet 25 Mg PO BID PRN


Ayr Saline (Sodium Chloride) 50 Ml Drops 2 Drop NS QID


Miralax (Polyethylene Glycol 3350) 17 Gm Powd.pack 17 Gm PO PRN BID PRN


Protonix (Pantoprazole Sodium) 40 Mg Tablet.dr 40 Mg PO DAILY


Zyprexa (Olanzapine) 5 Mg Tablet 5 Mg PO PRN DAILY PRN


Lisinopril 10 Mg Tablet 10 Mg PO DAILY


[lidoderm]   1 Patch TD DAILY


Levothyroxine Sodium 75 Mcg Tablet 75 Mcg PO DAILYAC


Ipratropium Bromide 0.2 Mg/1 Ml Solution 0.2 Mg IH PRN Q6HRS PRN


Hydrocodone-Apap 5-325  ** (Hydrocodone Bit/Acetaminophen) 1 Each Tablet 1 Tab 

PO PRN Q12HR PRN


Hydralazine Hcl 10 Mg Tablet 10 Mg PO TID


Lovenox (Enoxaparin Sodium) 40 Mg/0.4 Ml Disp.syrin 40 Mg SQ DAILY


Donepezil Hcl 10 Mg Tablet 10 Mg PO HS


Colace (Docusate Sodium) 100 Mg Capsule 100 Mg PO PRN DAILY PRN


Depakote Er (Divalproex Sodium) 500 Mg Tab.er.24h 750 Mg PO HS


Coreg  ** (Carvedilol) 6.25 Mg Tablet 6.25 Mg PO BIDWMEALS


Symbicort 160-4.5 Mcg Inhaler (Budesonide/Formoterol Fumarate) 10.2 Gm 

Hfa.aer.ad 2 Puff IH BID


Lipitor (Atorvastatin Calcium) 20 Mg Tablet 20 Mg PO QHS


Aspirin Ec (Aspirin) 325 Mg Tablet.dr 325 Mg PO DAILY


Amlodipine Besylate 10 Mg Tablet 10 Mg PO DAILY


Albuterol Sulfate Conc Neb Soln (Albuterol Sulfate) 2.5 Mg/0.5 Ml Vial.neb 2.5 

Mg NEB PRN Q6HRS PRN


Acetaminophen 650 Mg/20.3 Ml Solution 650 Mg PO PRN Q6HRS PRN


I have reviewed the current psychotropics carefully including drug 

interactions.  Risk benefit ratio favors no change other than as noted in my 

dictated progress note.





Diagnosis:


Problems:  


(1) Anxiety disorder


(2) Major depressive disorder, recurrent episode


(3) Psychosis, atypical


(4) Impulse control disorder











JULY ZAMUDIO MD Dec 30, 2018 23:14

## 2018-12-31 VITALS — SYSTOLIC BLOOD PRESSURE: 144 MMHG | DIASTOLIC BLOOD PRESSURE: 80 MMHG

## 2018-12-31 VITALS — DIASTOLIC BLOOD PRESSURE: 65 MMHG | SYSTOLIC BLOOD PRESSURE: 110 MMHG

## 2018-12-31 RX ADMIN — SERTRALINE SCH MG: 25 TABLET, FILM COATED ORAL at 07:44

## 2018-12-31 RX ADMIN — BUDESONIDE SCH MG: 0.5 SUSPENSION RESPIRATORY (INHALATION) at 10:53

## 2018-12-31 RX ADMIN — DONEPEZIL HYDROCHLORIDE SCH MG: 10 TABLET ORAL at 20:16

## 2018-12-31 RX ADMIN — DIVALPROEX SODIUM SCH MG: 125 CAPSULE, COATED PELLETS ORAL at 07:43

## 2018-12-31 RX ADMIN — HYDROCODONE BITARTRATE AND ACETAMINOPHEN PRN TAB: 5; 325 TABLET ORAL at 09:57

## 2018-12-31 RX ADMIN — ALBUTEROL SULFATE SCH MG: 108 AEROSOL, METERED RESPIRATORY (INHALATION) at 20:20

## 2018-12-31 RX ADMIN — CARVEDILOL SCH MG: 6.25 TABLET, FILM COATED ORAL at 16:40

## 2018-12-31 RX ADMIN — HYDROCODONE BITARTRATE AND ACETAMINOPHEN PRN TAB: 5; 325 TABLET ORAL at 20:43

## 2018-12-31 RX ADMIN — ALBUTEROL SULFATE SCH MG: 108 AEROSOL, METERED RESPIRATORY (INHALATION) at 16:08

## 2018-12-31 RX ADMIN — ALBUTEROL SULFATE SCH MG: 108 AEROSOL, METERED RESPIRATORY (INHALATION) at 10:52

## 2018-12-31 RX ADMIN — LIDOCAINE SCH PATCH: 50 PATCH CUTANEOUS at 07:46

## 2018-12-31 RX ADMIN — CARVEDILOL SCH MG: 6.25 TABLET, FILM COATED ORAL at 07:44

## 2018-12-31 RX ADMIN — LEVOTHYROXINE SODIUM SCH MCG: 75 TABLET ORAL at 05:04

## 2018-12-31 RX ADMIN — MULTIPLE VITAMINS W/ MINERALS TAB SCH TAB: TAB at 07:44

## 2018-12-31 RX ADMIN — ENOXAPARIN SODIUM SCH MG: 100 INJECTION SUBCUTANEOUS at 07:45

## 2018-12-31 RX ADMIN — Medication SCH EA: at 20:16

## 2018-12-31 RX ADMIN — PANTOPRAZOLE SODIUM SCH MG: 40 TABLET, DELAYED RELEASE ORAL at 07:44

## 2018-12-31 RX ADMIN — LISINOPRIL SCH MG: 10 TABLET ORAL at 07:45

## 2018-12-31 RX ADMIN — BUDESONIDE SCH MG: 0.5 SUSPENSION RESPIRATORY (INHALATION) at 20:20

## 2018-12-31 RX ADMIN — ASPIRIN SCH MG: 325 TABLET, DELAYED RELEASE ORAL at 07:44

## 2018-12-31 RX ADMIN — DIVALPROEX SODIUM SCH MG: 125 CAPSULE, COATED PELLETS ORAL at 20:16

## 2018-12-31 RX ADMIN — I-VITE, TAB 1000-60-2MG (60/BT) SCH TAB: TAB at 07:43

## 2018-12-31 RX ADMIN — ATORVASTATIN CALCIUM SCH MG: 20 TABLET, FILM COATED ORAL at 20:16

## 2018-12-31 RX ADMIN — ALBUTEROL SULFATE SCH MG: 108 AEROSOL, METERED RESPIRATORY (INHALATION) at 08:00

## 2018-12-31 RX ADMIN — QUETIAPINE FUMARATE SCH MG: 25 TABLET, FILM COATED ORAL at 13:26

## 2018-12-31 NOTE — PDOC
Exam


Note:


Marquise Note:


Please also refer to the separate dictated note~for this date of service 

dictated separately.~Patient seen individually. Discussed the patient with 

Nursing staff reviewed the chart.~Reviewed interim history and current 

functioning. Reviewed vital signs,~Labs/ Radiology~and current medications 

noted below. Continue current treatment with the changes noted in the dictated 

addendum note





Assessment:


Vital Signs:





 Vital Signs








  Date Time  Temp Pulse Resp B/P (MAP) Pulse Ox O2 Delivery O2 Flow Rate FiO2


 


12/31/18 22:14   20   Nasal Cannula 2.0 


 


12/31/18 20:20     98   


 


12/31/18 16:51 98.3 69  110/65 (80)    








I&O











Intake and Output 


 


 12/31/18





 07:01


 


Intake Total 1280 ml


 


Balance 1280 ml


 


 


 


Intake Oral 1280 ml











Current Medications:


Meds:





Current Medications


Multi-Ingredient Ointment (Analgesic Balm) 1 louis PRN QID  PRN TP MUSCLE PAIN;  

Start 12/21/18 at 18:45


Al Hydroxide/Mg Hydroxide (Mylanta Plus Xs) 15 ml PRN AFTMEALHC  PRN PO 

DYSPEPSIA;  Start 12/21/18 at 18:45


Magnesium Hydroxide (Milk Of Magnesia) 2,400 mg PRN QHS  PRN PO CONSTIPATION;  

Start 12/21/18 at 18:45


Aspirin (Aspirin Enteric Coated) 325 mg DAILY PO  Last administered on 12/31/ 18at 07:44;  Start 12/22/18 at 09:00


Atorvastatin Calcium (Lipitor) 20 mg QHS PO  Last administered on 12/31/18at 20:

16;  Start 12/21/18 at 21:00


Estrogens Conjugated (Premarin) 0.5 louis PRN DAILY  PRN VG vaginal dryness;  

Start 12/21/18 at 19:15


Acetaminophen/ Hydrocodone Bitart (Lortab 5/325) 1 tab PRN Q12HR  PRN PO PAIN 

Last administered on 12/24/18at 09:06;  Start 12/21/18 at 19:15;  Stop 12/24/18 

at 15:02;  Status DC


Ipratropium Bromide (Atrovent) 0.2 mg PRN Q6HRS  PRN IH SHORTNESS OF BREATH;  

Start 12/21/18 at 19:15


Levothyroxine Sodium (Synthroid) 75 mcg DAILYAC PO  Last administered on 12/22/ 18at 08:40;  Start 12/22/18 at 07:30;  Stop 12/22/18 at 12:26;  Status DC


Lisinopril (Prinivil) 10 mg DAILY PO  Last administered on 12/31/18at 07:45;  

Start 12/22/18 at 09:00


Acetaminophen (Tylenol) 650 mg PRN Q6HRS  PRN PO PAIN / TEMP Last administered 

on 12/28/18at 20:03;  Start 12/21/18 at 19:45


Non-Formulary Medication (Albuterol Sulfate (Albuterol Sulfate Conc Neb Soln)) 

2.5 mg PRN Q6HRS  PRN NEB SHORTNESS OF BREATH;  Start 12/21/18 at 19:15;  

Status UNV


Amlodipine Besylate (Norvasc) 10 mg DAILY PO  Last administered on 12/25/18at 08

:38;  Start 12/22/18 at 09:00;  Stop 12/25/18 at 12:27;  Status DC


Non-Formulary Medication (Budesonide/ Formoterol Fumarate (Symbicort 160-4.5 

Mcg Inhaler)) 2 puff BID IH ;  Start 12/21/18 at 21:00;  Status UNV


Carvedilol (Coreg) 6.25 mg BIDWMEALS PO  Last administered on 12/31/18at 16:40;

  Start 12/21/18 at 20:00


Divalproex Sodium (Depakote Er) 750 mg QHS PO  Last administered on 12/21/18at 

20:53;  Start 12/21/18 at 21:00;  Stop 12/22/18 at 21:13;  Status DC


Docusate Sodium (Colace) 100 mg PRN DAILY  PRN PO HARD STOOLS;  Start 12/21/18 

at 19:45


Donepezil HCl (Aricept) 10 mg QHS PO  Last administered on 12/31/18at 20:16;  

Start 12/21/18 at 21:00


Enoxaparin Sodium (Lovenox 40mg Syringe) 40 mg DAILY SQ  Last administered on 12 /31/18at 07:45;  Start 12/22/18 at 09:00


Hydralazine HCl (Apresoline) 10 mg TID PO  Last administered on 12/25/18at 08:39

;  Start 12/21/18 at 21:00;  Stop 12/25/18 at 12:27;  Status DC


Multivitamins/ Calcium (Thera-M Plus) 1 tab DAILY PO  Last administered on 12/31 /18at 07:44;  Start 12/22/18 at 09:00


Olanzapine (ZyPREXA) 5 mg PRN DAILY  PRN PO ANXIETY/AGITATION Last administered 

on 12/23/18at 10:50;  Start 12/22/18 at 09:00


Pantoprazole Sodium (Protonix) 40 mg DAILYAC PO  Last administered on 12/31/ 18at 07:44;  Start 12/22/18 at 07:30


Phenazopyridine HCl (Pyridium) 100 mg PRN TID  PRN PO URINARY PAIN;  Start 12/21 /18 at 19:45


Polyethylene Glycol (miraLAX) 17 gm PRN BID  PRN PO CONSTIPATION;  Start 12/22/ 18 at 09:00


Sodium Chloride (Saline Mist Nasal) 1 louis QID NS ;  Start 12/21/18 at 21:00;  

Status Cancel


Trazodone HCl (Desyrel) 25 mg PRN BID  PRN PO ANXIETY/AGITATION;  Start 12/21/ 18 at 19:45


Trazodone HCl (Desyrel) 50 mg PRN QHS  PRN PO INSOMNIA;  Start 12/21/18 at 19:45


Lidocaine (Lidoderm) 1 patch DAILY TD  Last administered on 12/31/18at 07:46;  

Start 12/22/18 at 09:00


Multivitamins/ Minerals (I-Irina) 1 tab DAILY PO  Last administered on 12/31/ 18at 07:43;  Start 12/22/18 at 09:00


Miscellaneous (Lidoderm Patch Removal) 1 ea QHS MC  Last administered on 12/31/ 18at 20:16;  Start 12/21/18 at 21:00


Albuterol Sulfate (Ventolin) 2.5 mg PRN Q6HRS  PRN NEB SHORTNESS OF BREATH;  

Start 12/21/18 at 19:45


Albuterol Sulfate (Ventolin) 2.5 mg RTQID NEB  Last administered on 12/31/18at 

20:20;  Start 12/21/18 at 20:00


Budesonide (Pulmicort) 0.5 mg RTBID NEB  Last administered on 12/31/18at 20:20;

  Start 12/21/18 at 20:00


Sodium Chloride (Ayr Saline Nasal) 2 louis QID NS ;  Start 12/21/18 at 21:00;  

Stop 12/22/18 at 13:08;  Status DC


Levothyroxine Sodium (Synthroid) 75 mcg DAILY06 PO  Last administered on 12/31/ 18at 05:04;  Start 12/23/18 at 06:00


Sodium Chloride (Saline Mist Nasal) 1 louis PRN QID  PRN NS NASAL CONGESTION;  

Start 12/22/18 at 13:15


Divalproex Sodium (Depakote Sprinkles) 750 mg HS PO  Last administered on 12/23/ 18at 20:20;  Start 12/22/18 at 21:30;  Stop 12/24/18 at 14:27;  Status DC


Sertraline HCl (Zoloft) 25 mg DAILY PO  Last administered on 12/25/18at 08:39;  

Start 12/23/18 at 09:00;  Stop 12/25/18 at 09:01;  Status DC


Sertraline HCl (Zoloft) 50 mg DAILY PO  Last administered on 12/31/18at 07:44;  

Start 12/26/18 at 09:00


Divalproex Sodium (Depakote Sprinkles) 500 mg BID94 PO  Last administered on 12/ 24/18at 07:55;  Start 12/24/18 at 09:00;  Stop 12/24/18 at 14:27;  Status DC


Quetiapine Fumarate (SEROquel) 12.5 mg 1300 PO  Last administered on 12/31/18at 

13:26;  Start 12/24/18 at 13:00


Divalproex Sodium (Depakote Sprinkles) 500 mg BID PO  Last administered on 12/26 /18at 09:59;  Start 12/24/18 at 21:00;  Stop 12/26/18 at 17:43;  Status DC


Acetaminophen/ Hydrocodone Bitart (Lortab 5/325) 1 tab PRN Q6HRS  PRN PO PAIN 

Last administered on 12/31/18at 20:43;  Start 12/24/18 at 15:15


Olanzapine (ZyPREXA ZYDIS) 2.5 mg PRN Q2HR  PRN PO PSYCHOSIS;  Start 12/24/18 

at 19:15


Divalproex Sodium (Depakote Sprinkles) 625 mg BID PO  Last administered on 12/31 /18at 20:16;  Start 12/26/18 at 21:00





Active Scripts


Active


Reported


[occuvite]   1 Tab PO DAILY


Pyridium (Phenazopyridine Hcl) 100 Mg Tablet 100 Mg PO PRN TID PRN


Trazodone Hcl 50 Mg Tablet 25 Mg PO PRN QHS PRN


Premarin (Estrogens, Conjugated) 30 Gm Cream.appl 0.5 Gm VG PRN DAILY PRN


Multivitamins (Multivitamin) 1 Each Tablet 1 Tab PO DAILY


Trazodone Hcl 50 Mg Tablet 25 Mg PO BID PRN


Ayr Saline (Sodium Chloride) 50 Ml Drops 2 Drop NS QID


Miralax (Polyethylene Glycol 3350) 17 Gm Powd.pack 17 Gm PO PRN BID PRN


Protonix (Pantoprazole Sodium) 40 Mg Tablet.dr 40 Mg PO DAILY


Zyprexa (Olanzapine) 5 Mg Tablet 5 Mg PO PRN DAILY PRN


Lisinopril 10 Mg Tablet 10 Mg PO DAILY


[lidoderm]   1 Patch TD DAILY


Levothyroxine Sodium 75 Mcg Tablet 75 Mcg PO DAILYAC


Ipratropium Bromide 0.2 Mg/1 Ml Solution 0.2 Mg IH PRN Q6HRS PRN


Hydrocodone-Apap 5-325  ** (Hydrocodone Bit/Acetaminophen) 1 Each Tablet 1 Tab 

PO PRN Q12HR PRN


Hydralazine Hcl 10 Mg Tablet 10 Mg PO TID


Lovenox (Enoxaparin Sodium) 40 Mg/0.4 Ml Disp.syrin 40 Mg SQ DAILY


Donepezil Hcl 10 Mg Tablet 10 Mg PO HS


Colace (Docusate Sodium) 100 Mg Capsule 100 Mg PO PRN DAILY PRN


Depakote Er (Divalproex Sodium) 500 Mg Tab.er.24h 750 Mg PO HS


Coreg  ** (Carvedilol) 6.25 Mg Tablet 6.25 Mg PO BIDWMEALS


Symbicort 160-4.5 Mcg Inhaler (Budesonide/Formoterol Fumarate) 10.2 Gm 

Hfa.aer.ad 2 Puff IH BID


Lipitor (Atorvastatin Calcium) 20 Mg Tablet 20 Mg PO QHS


Aspirin Ec (Aspirin) 325 Mg Tablet.dr 325 Mg PO DAILY


Amlodipine Besylate 10 Mg Tablet 10 Mg PO DAILY


Albuterol Sulfate Conc Neb Soln (Albuterol Sulfate) 2.5 Mg/0.5 Ml Vial.neb 2.5 

Mg NEB PRN Q6HRS PRN


Acetaminophen 650 Mg/20.3 Ml Solution 650 Mg PO PRN Q6HRS PRN


I have reviewed the current psychotropics carefully including drug 

interactions.  Risk benefit ratio favors no change other than as noted in my 

dictated progress note.





Diagnosis:


Problems:  


(1) Anxiety disorder


(2) Major depressive disorder, recurrent episode


(3) Psychosis, atypical


(4) Impulse control disorder











JULY ZAMUDIO MD Dec 31, 2018 22:47

## 2018-12-31 NOTE — PN
DATE:  12/28/2018



PSYCHIATRIC PROGRESS NOTE



This late entry 12/28/2018 covers elements not covered in my initial note.



SUBJECTIVE:  I met with the patient in the evening and staffed at a treatment

team meeting with the entire team and the patient's daughter-in-law, Allyson

attended.  Allyson talked about the patient's agitation, aggression, mood

lability and that she had been stealing while at home with her son and Allyson. 

The patient does not agree with Allyson's perception of her paranoia.  The

patient denies being previously diagnosed with bipolar disorder and we will

obtain records to confirm or refute this.  She has been getting agitated with

caregivers.



REVIEW OF SYSTEMS:  Ambulation impaired, in wheelchair.  No CV, , GI, eye

system symptoms on review.  She is on oxygen supplements.



MENTAL STATUS EXAM:  Reasonably oriented.  Speech is coherent, has some latency,

is somewhat pressured at times.  Abstraction fair, computation impaired,

language function intact.  Attention span short.



IMPRESSION:  Major neurocognitive disorder, Alzheimer, vascular with delusion,

depression, major depressive disorder, history of bipolar 1 disorder.  Rest

unchanged.



PLAN:  Obtain any past psychiatric records to confirm or refute the bipolar

diagnosis.  Continue current psychotropics.  Adjust further as clinically

indicated.  Allyson did attend the lengthy treatment team meeting, shared her

history of the patient's long history of bipolar disorder, which the patient is

in degree with.





______________________________

MAN ARLENE ZAMUDIO MD



DR:  GINNA/christiano  JOB#:  [id number] / [id number]

DD:  12/31/2018 12:43  DT:  12/31/2018 20:34

## 2019-01-01 VITALS — SYSTOLIC BLOOD PRESSURE: 167 MMHG | DIASTOLIC BLOOD PRESSURE: 82 MMHG

## 2019-01-01 VITALS — DIASTOLIC BLOOD PRESSURE: 67 MMHG | SYSTOLIC BLOOD PRESSURE: 117 MMHG

## 2019-01-01 RX ADMIN — DIVALPROEX SODIUM SCH MG: 125 CAPSULE, COATED PELLETS ORAL at 20:11

## 2019-01-01 RX ADMIN — HYDROCODONE BITARTRATE AND ACETAMINOPHEN PRN TAB: 5; 325 TABLET ORAL at 13:14

## 2019-01-01 RX ADMIN — Medication SCH EA: at 20:12

## 2019-01-01 RX ADMIN — DONEPEZIL HYDROCHLORIDE SCH MG: 10 TABLET ORAL at 20:12

## 2019-01-01 RX ADMIN — ASPIRIN SCH MG: 325 TABLET, DELAYED RELEASE ORAL at 07:53

## 2019-01-01 RX ADMIN — ACETAMINOPHEN PRN MG: 325 TABLET, FILM COATED ORAL at 16:35

## 2019-01-01 RX ADMIN — LISINOPRIL SCH MG: 10 TABLET ORAL at 07:53

## 2019-01-01 RX ADMIN — BUDESONIDE SCH MG: 0.5 SUSPENSION RESPIRATORY (INHALATION) at 09:26

## 2019-01-01 RX ADMIN — DIVALPROEX SODIUM SCH MG: 125 CAPSULE, COATED PELLETS ORAL at 07:52

## 2019-01-01 RX ADMIN — CARVEDILOL SCH MG: 6.25 TABLET, FILM COATED ORAL at 07:54

## 2019-01-01 RX ADMIN — ALBUTEROL SULFATE SCH MG: 108 AEROSOL, METERED RESPIRATORY (INHALATION) at 05:06

## 2019-01-01 RX ADMIN — ALBUTEROL SULFATE SCH MG: 108 AEROSOL, METERED RESPIRATORY (INHALATION) at 09:26

## 2019-01-01 RX ADMIN — SERTRALINE SCH MG: 25 TABLET, FILM COATED ORAL at 07:53

## 2019-01-01 RX ADMIN — MULTIPLE VITAMINS W/ MINERALS TAB SCH TAB: TAB at 07:53

## 2019-01-01 RX ADMIN — CARVEDILOL SCH MG: 6.25 TABLET, FILM COATED ORAL at 16:35

## 2019-01-01 RX ADMIN — PANTOPRAZOLE SODIUM SCH MG: 40 TABLET, DELAYED RELEASE ORAL at 07:53

## 2019-01-01 RX ADMIN — ALBUTEROL SULFATE SCH MG: 108 AEROSOL, METERED RESPIRATORY (INHALATION) at 15:18

## 2019-01-01 RX ADMIN — ALBUTEROL SULFATE SCH MG: 108 AEROSOL, METERED RESPIRATORY (INHALATION) at 20:48

## 2019-01-01 RX ADMIN — LEVOTHYROXINE SODIUM SCH MCG: 75 TABLET ORAL at 04:38

## 2019-01-01 RX ADMIN — ATORVASTATIN CALCIUM SCH MG: 20 TABLET, FILM COATED ORAL at 20:12

## 2019-01-01 RX ADMIN — QUETIAPINE FUMARATE SCH MG: 25 TABLET, FILM COATED ORAL at 13:14

## 2019-01-01 RX ADMIN — BUDESONIDE SCH MG: 0.5 SUSPENSION RESPIRATORY (INHALATION) at 20:48

## 2019-01-01 RX ADMIN — ENOXAPARIN SODIUM SCH MG: 100 INJECTION SUBCUTANEOUS at 07:52

## 2019-01-01 RX ADMIN — I-VITE, TAB 1000-60-2MG (60/BT) SCH TAB: TAB at 07:53

## 2019-01-01 RX ADMIN — LIDOCAINE SCH PATCH: 50 PATCH CUTANEOUS at 07:51

## 2019-01-01 NOTE — PDOC
Exam


Note:


Marquise Note:


Please also refer to the separate dictated note~for this date of service 

dictated separately.~Patient seen individually. Discussed the patient with 

Nursing staff reviewed the chart.~Reviewed interim history and current 

functioning. Reviewed vital signs,~Labs/ Radiology~and current medications 

noted below. Continue current treatment with the changes noted in the dictated 

addendum note





Assessment:


Vital Signs:





 Vital Signs








  Date Time  Temp Pulse Resp B/P (MAP) Pulse Ox O2 Delivery O2 Flow Rate FiO2


 


1/1/19 20:50     98 Nasal Cannula 2.0 


 


1/1/19 16:35  84  117/67    


 


1/1/19 16:30 97.4  19     








I&O











Intake and Output 


 


 1/1/19





 07:01


 


Intake Total 960 ml


 


Balance 960 ml


 


 


 


Intake Oral 960 ml











Current Medications:


Meds:





Current Medications


Multi-Ingredient Ointment (Analgesic Balm) 1 louis PRN QID  PRN TP MUSCLE PAIN;  

Start 12/21/18 at 18:45


Al Hydroxide/Mg Hydroxide (Mylanta Plus Xs) 15 ml PRN AFTMEALHC  PRN PO 

DYSPEPSIA;  Start 12/21/18 at 18:45


Magnesium Hydroxide (Milk Of Magnesia) 2,400 mg PRN QHS  PRN PO CONSTIPATION;  

Start 12/21/18 at 18:45


Aspirin (Aspirin Enteric Coated) 325 mg DAILY PO  Last administered on 1/1/19at 

07:53;  Start 12/22/18 at 09:00


Atorvastatin Calcium (Lipitor) 20 mg QHS PO  Last administered on 1/1/19at 20:12

;  Start 12/21/18 at 21:00


Estrogens Conjugated (Premarin) 0.5 louis PRN DAILY  PRN VG vaginal dryness;  

Start 12/21/18 at 19:15


Acetaminophen/ Hydrocodone Bitart (Lortab 5/325) 1 tab PRN Q12HR  PRN PO PAIN 

Last administered on 12/24/18at 09:06;  Start 12/21/18 at 19:15;  Stop 12/24/18 

at 15:02;  Status DC


Ipratropium Bromide (Atrovent) 0.2 mg PRN Q6HRS  PRN IH SHORTNESS OF BREATH;  

Start 12/21/18 at 19:15


Levothyroxine Sodium (Synthroid) 75 mcg DAILYAC PO  Last administered on 12/22/ 18at 08:40;  Start 12/22/18 at 07:30;  Stop 12/22/18 at 12:26;  Status DC


Lisinopril (Prinivil) 10 mg DAILY PO  Last administered on 1/1/19at 07:53;  

Start 12/22/18 at 09:00


Acetaminophen (Tylenol) 650 mg PRN Q6HRS  PRN PO PAIN / TEMP Last administered 

on 1/1/19at 16:35;  Start 12/21/18 at 19:45


Non-Formulary Medication (Albuterol Sulfate (Albuterol Sulfate Conc Neb Soln)) 

2.5 mg PRN Q6HRS  PRN NEB SHORTNESS OF BREATH;  Start 12/21/18 at 19:15;  

Status UNV


Amlodipine Besylate (Norvasc) 10 mg DAILY PO  Last administered on 12/25/18at 08

:38;  Start 12/22/18 at 09:00;  Stop 12/25/18 at 12:27;  Status DC


Non-Formulary Medication (Budesonide/ Formoterol Fumarate (Symbicort 160-4.5 

Mcg Inhaler)) 2 puff BID IH ;  Start 12/21/18 at 21:00;  Status UNV


Carvedilol (Coreg) 6.25 mg BIDWMEALS PO  Last administered on 1/1/19at 16:35;  

Start 12/21/18 at 20:00


Divalproex Sodium (Depakote Er) 750 mg QHS PO  Last administered on 12/21/18at 

20:53;  Start 12/21/18 at 21:00;  Stop 12/22/18 at 21:13;  Status DC


Docusate Sodium (Colace) 100 mg PRN DAILY  PRN PO HARD STOOLS;  Start 12/21/18 

at 19:45


Donepezil HCl (Aricept) 10 mg QHS PO  Last administered on 1/1/19at 20:12;  

Start 12/21/18 at 21:00


Enoxaparin Sodium (Lovenox 40mg Syringe) 40 mg DAILY SQ  Last administered on 1/ 1/19at 07:52;  Start 12/22/18 at 09:00


Hydralazine HCl (Apresoline) 10 mg TID PO  Last administered on 12/25/18at 08:39

;  Start 12/21/18 at 21:00;  Stop 12/25/18 at 12:27;  Status DC


Multivitamins/ Calcium (Thera-M Plus) 1 tab DAILY PO  Last administered on 1/1/ 19at 07:53;  Start 12/22/18 at 09:00


Olanzapine (ZyPREXA) 5 mg PRN DAILY  PRN PO ANXIETY/AGITATION Last administered 

on 12/23/18at 10:50;  Start 12/22/18 at 09:00


Pantoprazole Sodium (Protonix) 40 mg DAILYAC PO  Last administered on 1/1/19at 

07:53;  Start 12/22/18 at 07:30


Phenazopyridine HCl (Pyridium) 100 mg PRN TID  PRN PO URINARY PAIN;  Start 12/21 /18 at 19:45


Polyethylene Glycol (miraLAX) 17 gm PRN BID  PRN PO CONSTIPATION;  Start 12/22/ 18 at 09:00


Sodium Chloride (Saline Mist Nasal) 1 louis QID NS ;  Start 12/21/18 at 21:00;  

Status Cancel


Trazodone HCl (Desyrel) 25 mg PRN BID  PRN PO ANXIETY/AGITATION;  Start 12/21/ 18 at 19:45


Trazodone HCl (Desyrel) 50 mg PRN QHS  PRN PO INSOMNIA;  Start 12/21/18 at 19:45


Lidocaine (Lidoderm) 1 patch DAILY TD  Last administered on 1/1/19at 07:51;  

Start 12/22/18 at 09:00


Multivitamins/ Minerals (I-Irina) 1 tab DAILY PO  Last administered on 1/1/19at 

07:53;  Start 12/22/18 at 09:00


Miscellaneous (Lidoderm Patch Removal) 1 ea QHS MC  Last administered on 1/1/ 19at 20:12;  Start 12/21/18 at 21:00


Albuterol Sulfate (Ventolin) 2.5 mg PRN Q6HRS  PRN NEB SHORTNESS OF BREATH;  

Start 12/21/18 at 19:45


Albuterol Sulfate (Ventolin) 2.5 mg RTQID NEB  Last administered on 1/1/19at 20:

48;  Start 12/21/18 at 20:00


Budesonide (Pulmicort) 0.5 mg RTBID NEB  Last administered on 1/1/19at 20:48;  

Start 12/21/18 at 20:00


Sodium Chloride (Ayr Saline Nasal) 2 louis QID NS ;  Start 12/21/18 at 21:00;  

Stop 12/22/18 at 13:08;  Status DC


Levothyroxine Sodium (Synthroid) 75 mcg DAILY06 PO  Last administered on 1/1/ 19at 04:38;  Start 12/23/18 at 06:00


Sodium Chloride (Saline Mist Nasal) 1 louis PRN QID  PRN NS NASAL CONGESTION;  

Start 12/22/18 at 13:15


Divalproex Sodium (Depakote Sprinkles) 750 mg HS PO  Last administered on 12/23/ 18at 20:20;  Start 12/22/18 at 21:30;  Stop 12/24/18 at 14:27;  Status DC


Sertraline HCl (Zoloft) 25 mg DAILY PO  Last administered on 12/25/18at 08:39;  

Start 12/23/18 at 09:00;  Stop 12/25/18 at 09:01;  Status DC


Sertraline HCl (Zoloft) 50 mg DAILY PO  Last administered on 1/1/19at 07:53;  

Start 12/26/18 at 09:00


Divalproex Sodium (Depakote Sprinkles) 500 mg BID94 PO  Last administered on 12/ 24/18at 07:55;  Start 12/24/18 at 09:00;  Stop 12/24/18 at 14:27;  Status DC


Quetiapine Fumarate (SEROquel) 12.5 mg 1300 PO  Last administered on 1/1/19at 13

:14;  Start 12/24/18 at 13:00


Divalproex Sodium (Depakote Sprinkles) 500 mg BID PO  Last administered on 12/26 /18at 09:59;  Start 12/24/18 at 21:00;  Stop 12/26/18 at 17:43;  Status DC


Acetaminophen/ Hydrocodone Bitart (Lortab 5/325) 1 tab PRN Q6HRS  PRN PO PAIN 

Last administered on 1/1/19at 13:14;  Start 12/24/18 at 15:15


Olanzapine (ZyPREXA ZYDIS) 2.5 mg PRN Q2HR  PRN PO PSYCHOSIS;  Start 12/24/18 

at 19:15


Divalproex Sodium (Depakote Sprinkles) 625 mg BID PO  Last administered on 1/1/ 19at 20:11;  Start 12/26/18 at 21:00





Active Scripts


Active


Reported


[occuvite]   1 Tab PO DAILY


Pyridium (Phenazopyridine Hcl) 100 Mg Tablet 100 Mg PO PRN TID PRN


Trazodone Hcl 50 Mg Tablet 25 Mg PO PRN QHS PRN


Premarin (Estrogens, Conjugated) 30 Gm Cream.appl 0.5 Gm VG PRN DAILY PRN


Multivitamins (Multivitamin) 1 Each Tablet 1 Tab PO DAILY


Trazodone Hcl 50 Mg Tablet 25 Mg PO BID PRN


Ayr Saline (Sodium Chloride) 50 Ml Drops 2 Drop NS QID


Miralax (Polyethylene Glycol 3350) 17 Gm Powd.pack 17 Gm PO PRN BID PRN


Protonix (Pantoprazole Sodium) 40 Mg Tablet.dr 40 Mg PO DAILY


Zyprexa (Olanzapine) 5 Mg Tablet 5 Mg PO PRN DAILY PRN


Lisinopril 10 Mg Tablet 10 Mg PO DAILY


[lidoderm]   1 Patch TD DAILY


Levothyroxine Sodium 75 Mcg Tablet 75 Mcg PO DAILYAC


Ipratropium Bromide 0.2 Mg/1 Ml Solution 0.2 Mg IH PRN Q6HRS PRN


Hydrocodone-Apap 5-325  ** (Hydrocodone Bit/Acetaminophen) 1 Each Tablet 1 Tab 

PO PRN Q12HR PRN


Hydralazine Hcl 10 Mg Tablet 10 Mg PO TID


Lovenox (Enoxaparin Sodium) 40 Mg/0.4 Ml Disp.syrin 40 Mg SQ DAILY


Donepezil Hcl 10 Mg Tablet 10 Mg PO HS


Colace (Docusate Sodium) 100 Mg Capsule 100 Mg PO PRN DAILY PRN


Depakote Er (Divalproex Sodium) 500 Mg Tab.er.24h 750 Mg PO HS


Coreg  ** (Carvedilol) 6.25 Mg Tablet 6.25 Mg PO BIDWMEALS


Symbicort 160-4.5 Mcg Inhaler (Budesonide/Formoterol Fumarate) 10.2 Gm 

Hfa.aer.ad 2 Puff IH BID


Lipitor (Atorvastatin Calcium) 20 Mg Tablet 20 Mg PO QHS


Aspirin Ec (Aspirin) 325 Mg Tablet.dr 325 Mg PO DAILY


Amlodipine Besylate 10 Mg Tablet 10 Mg PO DAILY


Albuterol Sulfate Conc Neb Soln (Albuterol Sulfate) 2.5 Mg/0.5 Ml Vial.neb 2.5 

Mg NEB PRN Q6HRS PRN


Acetaminophen 650 Mg/20.3 Ml Solution 650 Mg PO PRN Q6HRS PRN


I have reviewed the current psychotropics carefully including drug 

interactions.  Risk benefit ratio favors no change other than as noted in my 

dictated progress note.





Diagnosis:


Problems:  


(1) Anxiety disorder


(2) Major depressive disorder, recurrent episode


(3) Psychosis, atypical


(4) Impulse control disorder











JULY ZAMUDIO MD Jan 1, 2019 22:57

## 2019-01-01 NOTE — PN
DATE:  12/31/2018



PSYCHIATRIC PROGRESS NOTE



This late entry 12/31/2018 covers elements, not covered in my initial note.



SUBJECTIVE:  I met with the patient in the evening.  The patient slept 10 hours

previous night.  She has had no behaviors, completely denies.  She has been

diagnosed with bipolar disorder in the past.  Family had indicated she has a

long history of bipolar disorder.  We are trying to obtain those records, none

available at this time.



REVIEW OF SYSTEMS:  Positive for shortness of breath, on oxygen supplements, in

wheelchair.  Impaired ambulation.  No CV, , pulmonary, eye system symptoms on

review other than above.



MENTAL STATUS EXAM:  Oriented to herself and situation.  Speech is coherent,

abstraction fair, computation impaired, language function intact.  Mood and

affect showing improvement, improved lability.  Does have some short-term memory

deficits.



LABORATORY DATA:  Reviewed.



IMPRESSION:  Major depressive disorder, rule out bipolar 1 disorder,

unspecified; major neurocognitive disorder, early Alzheimer, vascular with

depression.  Rest unchanged.



PLAN:  No change from initial note.  Valproic acid level therapeutic at 54.





______________________________

MAN ARLENE ZAMUDIO MD



DR:  GINNA/christiano  JOB#:  [id number] / [id number]

DD:  01/01/2019 20:37  DT:  01/01/2019 23:37

## 2019-01-01 NOTE — PN
DATE:  12/30/2018



This late entry, 12/30/2018, covers elements not covered in my initial note.



SUBJECTIVE:  I met with the patient in the evening.  The patient slept 5-1/4

hours previous night.  She has been fairly appropriate on the unit, still angry

at her daughter-in-law and believes her son is trying to take her money and I

processed this with her.  She denies any past diagnosis of bipolar disorder as

the family have alluded to.



REVIEW OF SYSTEMS:  Shortness of breath, on O2 supplements, impaired ambulation,

in wheelchair.  No CV, , pulmonary, eye system symptoms on review other than

above.



MENTAL STATUS EXAM:  Reasonably oriented.  Speech is coherent, a little

pressured at times.  Abstraction fair, computation impaired, language function

intact.  Mood and affect showing improvement.



LABORATORY DATA:  Reviewed.



IMPRESSION:  Unchanged from initial note.



PLAN:  No change from initial note.





______________________________

JULY ZAMUDIO MD



DR:  GINNA/christiano  JOB#:  [id number] / [id number]

DD:  12/31/2018 17:25  DT:  01/01/2019 00:20

## 2019-01-01 NOTE — PN
DATE:  12/29/2018



This is a late entry of 12/29/2018 covers elements not covered in my initial

note.



SUBJECTIVE:  I met with the patient in the evening.  The patient slept 6-1/4

hours previous night.  The patient has been cooperative.  Valproic acid level is

54, therapeutic.  We are awaiting past records regarding bipolar disorder with

the patient denying she was ever diagnosed with bipolar and states her

daughter-in-law Allyson is "a liar."



REVIEW OF SYSTEMS:  Ambulation impaired, in wheelchair, some shortness of

breath, on O2 supplements.  No CV, , eye, ENT system symptoms on review.



MENTAL STATUS EXAM:  Oriented to herself and situation.  Speech is coherent,

abstraction fair, computation impaired, language function intact.  Mood and

affect showing improvement.



LABORATORY DATA:  Reviewed.



IMPRESSION:  Unchanged from initial note.



PLAN:  No change from initial note.





______________________________

MAN ARLENE ZAMUDIO MD



DR:  GINNA/christiano  JOB#:  [id number] / [id number]

DD:  12/31/2018 17:12  DT:  12/31/2018 23:42

## 2019-01-02 VITALS — DIASTOLIC BLOOD PRESSURE: 79 MMHG | SYSTOLIC BLOOD PRESSURE: 132 MMHG

## 2019-01-02 VITALS — SYSTOLIC BLOOD PRESSURE: 116 MMHG | DIASTOLIC BLOOD PRESSURE: 69 MMHG

## 2019-01-02 RX ADMIN — CARVEDILOL SCH MG: 6.25 TABLET, FILM COATED ORAL at 08:27

## 2019-01-02 RX ADMIN — BUDESONIDE SCH MG: 0.5 SUSPENSION RESPIRATORY (INHALATION) at 20:12

## 2019-01-02 RX ADMIN — LIDOCAINE SCH PATCH: 50 PATCH CUTANEOUS at 08:30

## 2019-01-02 RX ADMIN — LISINOPRIL SCH MG: 10 TABLET ORAL at 08:28

## 2019-01-02 RX ADMIN — I-VITE, TAB 1000-60-2MG (60/BT) SCH TAB: TAB at 08:26

## 2019-01-02 RX ADMIN — HYDROCODONE BITARTRATE AND ACETAMINOPHEN PRN TAB: 5; 325 TABLET ORAL at 10:12

## 2019-01-02 RX ADMIN — MULTIPLE VITAMINS W/ MINERALS TAB SCH TAB: TAB at 08:26

## 2019-01-02 RX ADMIN — ALBUTEROL SULFATE SCH MG: 108 AEROSOL, METERED RESPIRATORY (INHALATION) at 09:46

## 2019-01-02 RX ADMIN — PANTOPRAZOLE SODIUM SCH MG: 40 TABLET, DELAYED RELEASE ORAL at 08:28

## 2019-01-02 RX ADMIN — DONEPEZIL HYDROCHLORIDE SCH MG: 10 TABLET ORAL at 20:07

## 2019-01-02 RX ADMIN — ALBUTEROL SULFATE SCH MG: 108 AEROSOL, METERED RESPIRATORY (INHALATION) at 05:18

## 2019-01-02 RX ADMIN — ATORVASTATIN CALCIUM SCH MG: 20 TABLET, FILM COATED ORAL at 20:07

## 2019-01-02 RX ADMIN — ALBUTEROL SULFATE SCH MG: 108 AEROSOL, METERED RESPIRATORY (INHALATION) at 20:12

## 2019-01-02 RX ADMIN — Medication SCH EA: at 20:07

## 2019-01-02 RX ADMIN — SERTRALINE SCH MG: 25 TABLET, FILM COATED ORAL at 08:26

## 2019-01-02 RX ADMIN — QUETIAPINE FUMARATE SCH MG: 25 TABLET, FILM COATED ORAL at 13:42

## 2019-01-02 RX ADMIN — CARVEDILOL SCH MG: 6.25 TABLET, FILM COATED ORAL at 16:40

## 2019-01-02 RX ADMIN — ALBUTEROL SULFATE SCH MG: 108 AEROSOL, METERED RESPIRATORY (INHALATION) at 15:29

## 2019-01-02 RX ADMIN — BUDESONIDE SCH MG: 0.5 SUSPENSION RESPIRATORY (INHALATION) at 08:00

## 2019-01-02 RX ADMIN — DIVALPROEX SODIUM SCH MG: 125 CAPSULE, COATED PELLETS ORAL at 20:07

## 2019-01-02 RX ADMIN — ASPIRIN SCH MG: 325 TABLET, DELAYED RELEASE ORAL at 08:26

## 2019-01-02 RX ADMIN — ENOXAPARIN SODIUM SCH MG: 100 INJECTION SUBCUTANEOUS at 08:29

## 2019-01-02 RX ADMIN — DIVALPROEX SODIUM SCH MG: 125 CAPSULE, COATED PELLETS ORAL at 08:29

## 2019-01-02 RX ADMIN — LEVOTHYROXINE SODIUM SCH MCG: 75 TABLET ORAL at 05:38

## 2019-01-02 NOTE — PDOC
Exam


Note:


Marquise Note:


Please also refer to the separate dictated note~for this date of service 

dictated separately.~Patient seen individually. Discussed the patient with 

Nursing staff reviewed the chart.~Reviewed interim history and current 

functioning. Reviewed vital signs,~Labs/ Radiology~and current medications 

noted below. Continue current treatment with the changes noted in the dictated 

addendum note





Assessment:


Vital Signs:





 Vital Signs








  Date Time  Temp Pulse Resp B/P (MAP) Pulse Ox O2 Delivery O2 Flow Rate FiO2


 


1/2/19 20:20     98 Nasal Cannula 2.0 


 


1/2/19 16:40  69  116/69    


 


1/2/19 16:21 97.5  24     








I&O











Intake and Output 


 


 1/2/19





 07:01


 


Intake Total 1080 ml


 


Balance 1080 ml


 


 


 


Intake Oral 1080 ml











Current Medications:


Meds:





Current Medications


Multi-Ingredient Ointment (Analgesic Balm) 1 louis PRN QID  PRN TP MUSCLE PAIN;  

Start 12/21/18 at 18:45


Al Hydroxide/Mg Hydroxide (Mylanta Plus Xs) 15 ml PRN AFTMEALHC  PRN PO 

DYSPEPSIA;  Start 12/21/18 at 18:45


Magnesium Hydroxide (Milk Of Magnesia) 2,400 mg PRN QHS  PRN PO CONSTIPATION;  

Start 12/21/18 at 18:45


Aspirin (Aspirin Enteric Coated) 325 mg DAILY PO  Last administered on 1/2/19at 

08:26;  Start 12/22/18 at 09:00


Atorvastatin Calcium (Lipitor) 20 mg QHS PO  Last administered on 1/2/19at 20:07

;  Start 12/21/18 at 21:00


Estrogens Conjugated (Premarin) 0.5 louis PRN DAILY  PRN VG vaginal dryness;  

Start 12/21/18 at 19:15


Acetaminophen/ Hydrocodone Bitart (Lortab 5/325) 1 tab PRN Q12HR  PRN PO PAIN 

Last administered on 12/24/18at 09:06;  Start 12/21/18 at 19:15;  Stop 12/24/18 

at 15:02;  Status DC


Ipratropium Bromide (Atrovent) 0.2 mg PRN Q6HRS  PRN IH SHORTNESS OF BREATH;  

Start 12/21/18 at 19:15


Levothyroxine Sodium (Synthroid) 75 mcg DAILYAC PO  Last administered on 12/22/ 18at 08:40;  Start 12/22/18 at 07:30;  Stop 12/22/18 at 12:26;  Status DC


Lisinopril (Prinivil) 10 mg DAILY PO  Last administered on 1/2/19at 08:28;  

Start 12/22/18 at 09:00


Acetaminophen (Tylenol) 650 mg PRN Q6HRS  PRN PO PAIN / TEMP Last administered 

on 1/1/19at 16:35;  Start 12/21/18 at 19:45


Non-Formulary Medication (Albuterol Sulfate (Albuterol Sulfate Conc Neb Soln)) 

2.5 mg PRN Q6HRS  PRN NEB SHORTNESS OF BREATH;  Start 12/21/18 at 19:15;  

Status UNV


Amlodipine Besylate (Norvasc) 10 mg DAILY PO  Last administered on 12/25/18at 08

:38;  Start 12/22/18 at 09:00;  Stop 12/25/18 at 12:27;  Status DC


Non-Formulary Medication (Budesonide/ Formoterol Fumarate (Symbicort 160-4.5 

Mcg Inhaler)) 2 puff BID IH ;  Start 12/21/18 at 21:00;  Status UNV


Carvedilol (Coreg) 6.25 mg BIDWMEALS PO  Last administered on 1/2/19at 16:40;  

Start 12/21/18 at 20:00


Divalproex Sodium (Depakote Er) 750 mg QHS PO  Last administered on 12/21/18at 

20:53;  Start 12/21/18 at 21:00;  Stop 12/22/18 at 21:13;  Status DC


Docusate Sodium (Colace) 100 mg PRN DAILY  PRN PO HARD STOOLS;  Start 12/21/18 

at 19:45


Donepezil HCl (Aricept) 10 mg QHS PO  Last administered on 1/2/19at 20:07;  

Start 12/21/18 at 21:00


Enoxaparin Sodium (Lovenox 40mg Syringe) 40 mg DAILY SQ  Last administered on 1/ 2/19at 08:29;  Start 12/22/18 at 09:00


Hydralazine HCl (Apresoline) 10 mg TID PO  Last administered on 12/25/18at 08:39

;  Start 12/21/18 at 21:00;  Stop 12/25/18 at 12:27;  Status DC


Multivitamins/ Calcium (Thera-M Plus) 1 tab DAILY PO  Last administered on 1/2/ 19at 08:26;  Start 12/22/18 at 09:00


Olanzapine (ZyPREXA) 5 mg PRN DAILY  PRN PO ANXIETY/AGITATION Last administered 

on 12/23/18at 10:50;  Start 12/22/18 at 09:00


Pantoprazole Sodium (Protonix) 40 mg DAILYAC PO  Last administered on 1/2/19at 

08:28;  Start 12/22/18 at 07:30


Phenazopyridine HCl (Pyridium) 100 mg PRN TID  PRN PO URINARY PAIN;  Start 12/21 /18 at 19:45


Polyethylene Glycol (miraLAX) 17 gm PRN BID  PRN PO CONSTIPATION;  Start 12/22/ 18 at 09:00


Sodium Chloride (Saline Mist Nasal) 1 louis QID NS ;  Start 12/21/18 at 21:00;  

Status Cancel


Trazodone HCl (Desyrel) 25 mg PRN BID  PRN PO ANXIETY/AGITATION;  Start 12/21/ 18 at 19:45


Trazodone HCl (Desyrel) 50 mg PRN QHS  PRN PO INSOMNIA;  Start 12/21/18 at 19:45


Lidocaine (Lidoderm) 1 patch DAILY TD  Last administered on 1/2/19at 08:30;  

Start 12/22/18 at 09:00


Multivitamins/ Minerals (I-Irina) 1 tab DAILY PO  Last administered on 1/2/19at 

08:26;  Start 12/22/18 at 09:00


Miscellaneous (Lidoderm Patch Removal) 1 ea QHS MC  Last administered on 1/2/ 19at 20:07;  Start 12/21/18 at 21:00


Albuterol Sulfate (Ventolin) 2.5 mg PRN Q6HRS  PRN NEB SHORTNESS OF BREATH;  

Start 12/21/18 at 19:45


Albuterol Sulfate (Ventolin) 2.5 mg RTQID NEB  Last administered on 1/2/19at 20:

12;  Start 12/21/18 at 20:00


Budesonide (Pulmicort) 0.5 mg RTBID NEB  Last administered on 1/2/19at 20:12;  

Start 12/21/18 at 20:00


Sodium Chloride (Ayr Saline Nasal) 2 louis QID NS ;  Start 12/21/18 at 21:00;  

Stop 12/22/18 at 13:08;  Status DC


Levothyroxine Sodium (Synthroid) 75 mcg DAILY06 PO  Last administered on 1/2/ 19at 05:38;  Start 12/23/18 at 06:00


Sodium Chloride (Saline Mist Nasal) 1 louis PRN QID  PRN NS NASAL CONGESTION;  

Start 12/22/18 at 13:15


Divalproex Sodium (Depakote Sprinkles) 750 mg HS PO  Last administered on 12/23/ 18at 20:20;  Start 12/22/18 at 21:30;  Stop 12/24/18 at 14:27;  Status DC


Sertraline HCl (Zoloft) 25 mg DAILY PO  Last administered on 12/25/18at 08:39;  

Start 12/23/18 at 09:00;  Stop 12/25/18 at 09:01;  Status DC


Sertraline HCl (Zoloft) 50 mg DAILY PO  Last administered on 1/2/19at 08:26;  

Start 12/26/18 at 09:00


Divalproex Sodium (Depakote Sprinkles) 500 mg BID94 PO  Last administered on 12/ 24/18at 07:55;  Start 12/24/18 at 09:00;  Stop 12/24/18 at 14:27;  Status DC


Quetiapine Fumarate (SEROquel) 12.5 mg 1300 PO  Last administered on 1/2/19at 13

:42;  Start 12/24/18 at 13:00


Divalproex Sodium (Depakote Sprinkles) 500 mg BID PO  Last administered on 12/26 /18at 09:59;  Start 12/24/18 at 21:00;  Stop 12/26/18 at 17:43;  Status DC


Acetaminophen/ Hydrocodone Bitart (Lortab 5/325) 1 tab PRN Q6HRS  PRN PO PAIN 

Last administered on 1/2/19at 10:12;  Start 12/24/18 at 15:15


Olanzapine (ZyPREXA ZYDIS) 2.5 mg PRN Q2HR  PRN PO PSYCHOSIS;  Start 12/24/18 

at 19:15


Divalproex Sodium (Depakote Sprinkles) 625 mg BID PO  Last administered on 1/2/ 19at 20:07;  Start 12/26/18 at 21:00





Active Scripts


Active


Reported


[occuvite]   1 Tab PO DAILY


Pyridium (Phenazopyridine Hcl) 100 Mg Tablet 100 Mg PO PRN TID PRN


Trazodone Hcl 50 Mg Tablet 25 Mg PO PRN QHS PRN


Premarin (Estrogens, Conjugated) 30 Gm Cream.appl 0.5 Gm VG PRN DAILY PRN


Multivitamins (Multivitamin) 1 Each Tablet 1 Tab PO DAILY


Trazodone Hcl 50 Mg Tablet 25 Mg PO BID PRN


Ayr Saline (Sodium Chloride) 50 Ml Drops 2 Drop NS QID


Miralax (Polyethylene Glycol 3350) 17 Gm Powd.pack 17 Gm PO PRN BID PRN


Protonix (Pantoprazole Sodium) 40 Mg Tablet.dr 40 Mg PO DAILY


Zyprexa (Olanzapine) 5 Mg Tablet 5 Mg PO PRN DAILY PRN


Lisinopril 10 Mg Tablet 10 Mg PO DAILY


[lidoderm]   1 Patch TD DAILY


Levothyroxine Sodium 75 Mcg Tablet 75 Mcg PO DAILYAC


Ipratropium Bromide 0.2 Mg/1 Ml Solution 0.2 Mg IH PRN Q6HRS PRN


Hydrocodone-Apap 5-325  ** (Hydrocodone Bit/Acetaminophen) 1 Each Tablet 1 Tab 

PO PRN Q12HR PRN


Hydralazine Hcl 10 Mg Tablet 10 Mg PO TID


Lovenox (Enoxaparin Sodium) 40 Mg/0.4 Ml Disp.syrin 40 Mg SQ DAILY


Donepezil Hcl 10 Mg Tablet 10 Mg PO HS


Colace (Docusate Sodium) 100 Mg Capsule 100 Mg PO PRN DAILY PRN


Depakote Er (Divalproex Sodium) 500 Mg Tab.er.24h 750 Mg PO HS


Coreg  ** (Carvedilol) 6.25 Mg Tablet 6.25 Mg PO BIDWMEALS


Symbicort 160-4.5 Mcg Inhaler (Budesonide/Formoterol Fumarate) 10.2 Gm 

Hfa.aer.ad 2 Puff IH BID


Lipitor (Atorvastatin Calcium) 20 Mg Tablet 20 Mg PO QHS


Aspirin Ec (Aspirin) 325 Mg Tablet. 325 Mg PO DAILY


Amlodipine Besylate 10 Mg Tablet 10 Mg PO DAILY


Albuterol Sulfate Conc Neb Soln (Albuterol Sulfate) 2.5 Mg/0.5 Ml Vial.neb 2.5 

Mg NEB PRN Q6HRS PRN


Acetaminophen 650 Mg/20.3 Ml Solution 650 Mg PO PRN Q6HRS PRN


I have reviewed the current psychotropics carefully including drug 

interactions.  Risk benefit ratio favors no change other than as noted in my 

dictated progress note.





Diagnosis:


Problems:  


(1) Anxiety disorder


(2) Major depressive disorder, recurrent episode


(3) Psychosis, atypical


(4) Impulse control disorder











JULY ZAMUDIO MD Jan 2, 2019 22:48

## 2019-01-02 NOTE — PN
DATE:  01/01/2019



PSYCHIATRIC PROGRESS NOTE



This late entry 01/01/2019 covers elements not covered in my initial note.



SUBJECTIVE:  I met with the patient in the evening at some length.  She slept

7-1/4 hours previous night.  She has been somewhat obsessed about getting her

pain medications on time, but otherwise has been cooperative.



REVIEW OF SYSTEMS:  Ambulation impaired, in wheelchair, some shortness of breath

on O2 supplements.  No CV, , GI, eye system symptoms on review.



MENTAL STATUS EXAM:  Reasonably oriented.  Speech is coherent, abstraction fair,

computation impaired, language function intact.  Mood and affect still somewhat

depressed, anxious, but improved.  No suicidal ideation.



LABORATORY DATA:  Reviewed.



IMPRESSION:  Major depressive disorder, recurrent, in partial remission; history

of bipolar 1 disorder, unspecified, but we are waiting past psychiatric records

to confirm this; cognitive disorder, unspecified.



PLAN:  No change from initial note.





______________________________

MAN ARLENE ZAMUDIO MD



DR:  GINNA/christiano  JOB#:  [id number] / [id number]

DD:  01/02/2019 17:17  DT:  01/02/2019 23:28

## 2019-01-03 VITALS — SYSTOLIC BLOOD PRESSURE: 141 MMHG | DIASTOLIC BLOOD PRESSURE: 73 MMHG

## 2019-01-03 VITALS — DIASTOLIC BLOOD PRESSURE: 72 MMHG | SYSTOLIC BLOOD PRESSURE: 133 MMHG

## 2019-01-03 RX ADMIN — ATORVASTATIN CALCIUM SCH MG: 20 TABLET, FILM COATED ORAL at 19:44

## 2019-01-03 RX ADMIN — ONDANSETRON PRN MG: 4 TABLET, ORALLY DISINTEGRATING ORAL at 08:30

## 2019-01-03 RX ADMIN — ALBUTEROL SULFATE SCH MG: 108 AEROSOL, METERED RESPIRATORY (INHALATION) at 21:12

## 2019-01-03 RX ADMIN — MULTIPLE VITAMINS W/ MINERALS TAB SCH TAB: TAB at 08:23

## 2019-01-03 RX ADMIN — CARVEDILOL SCH MG: 6.25 TABLET, FILM COATED ORAL at 16:47

## 2019-01-03 RX ADMIN — ASPIRIN SCH MG: 325 TABLET, DELAYED RELEASE ORAL at 08:22

## 2019-01-03 RX ADMIN — HYDROCODONE BITARTRATE AND ACETAMINOPHEN PRN TAB: 5; 325 TABLET ORAL at 19:52

## 2019-01-03 RX ADMIN — ALBUTEROL SULFATE SCH MG: 108 AEROSOL, METERED RESPIRATORY (INHALATION) at 10:23

## 2019-01-03 RX ADMIN — PANTOPRAZOLE SODIUM SCH MG: 40 TABLET, DELAYED RELEASE ORAL at 08:23

## 2019-01-03 RX ADMIN — BUDESONIDE SCH MG: 0.5 SUSPENSION RESPIRATORY (INHALATION) at 10:23

## 2019-01-03 RX ADMIN — HYDROCODONE BITARTRATE AND ACETAMINOPHEN PRN TAB: 5; 325 TABLET ORAL at 06:05

## 2019-01-03 RX ADMIN — SERTRALINE SCH MG: 25 TABLET, FILM COATED ORAL at 08:23

## 2019-01-03 RX ADMIN — DIVALPROEX SODIUM SCH MG: 125 CAPSULE, COATED PELLETS ORAL at 19:44

## 2019-01-03 RX ADMIN — DIVALPROEX SODIUM SCH MG: 125 CAPSULE, COATED PELLETS ORAL at 08:25

## 2019-01-03 RX ADMIN — ALBUTEROL SULFATE SCH MG: 108 AEROSOL, METERED RESPIRATORY (INHALATION) at 05:22

## 2019-01-03 RX ADMIN — LEVOTHYROXINE SODIUM SCH MCG: 75 TABLET ORAL at 06:05

## 2019-01-03 RX ADMIN — ENOXAPARIN SODIUM SCH MG: 100 INJECTION SUBCUTANEOUS at 08:25

## 2019-01-03 RX ADMIN — I-VITE, TAB 1000-60-2MG (60/BT) SCH TAB: TAB at 08:23

## 2019-01-03 RX ADMIN — DONEPEZIL HYDROCHLORIDE SCH MG: 10 TABLET ORAL at 19:44

## 2019-01-03 RX ADMIN — LIDOCAINE SCH PATCH: 50 PATCH CUTANEOUS at 08:26

## 2019-01-03 RX ADMIN — CARVEDILOL SCH MG: 6.25 TABLET, FILM COATED ORAL at 08:24

## 2019-01-03 RX ADMIN — Medication SCH EA: at 19:44

## 2019-01-03 RX ADMIN — LISINOPRIL SCH MG: 10 TABLET ORAL at 08:23

## 2019-01-03 RX ADMIN — BUDESONIDE SCH MG: 0.5 SUSPENSION RESPIRATORY (INHALATION) at 21:12

## 2019-01-03 RX ADMIN — ALBUTEROL SULFATE SCH MG: 108 AEROSOL, METERED RESPIRATORY (INHALATION) at 15:24

## 2019-01-03 RX ADMIN — QUETIAPINE FUMARATE SCH MG: 25 TABLET, FILM COATED ORAL at 14:02

## 2019-01-03 NOTE — PDOC
Exam


Note:


Marquise Note:


Please also refer to the separate dictated note~for this date of service 

dictated separately.~Patient seen individually. Discussed the patient with 

Nursing staff reviewed the chart.~Reviewed interim history and current 

functioning. Reviewed vital signs,~Labs/ Radiology~and current medications 

noted below. Continue current treatment with the changes noted in the dictated 

addendum note





Assessment:


Vital Signs:





 Vital Signs








  Date Time  Temp Pulse Resp B/P (MAP) Pulse Ox O2 Delivery O2 Flow Rate FiO2


 


1/3/19 21:20     94   


 


1/3/19 21:15      Nasal Cannula 2.0 


 


1/3/19 16:47  77  133/72    


 


1/3/19 16:01 97.7  20     








I&O











Intake and Output 


 


 1/3/19





 07:01


 


Intake Total 900 ml


 


Balance 900 ml


 


 


 


Intake Oral 900 ml


 


# Bowel Movements 2











Current Medications:


Meds:





Current Medications


Multi-Ingredient Ointment (Analgesic Balm) 1 louis PRN QID  PRN TP MUSCLE PAIN;  

Start 12/21/18 at 18:45


Al Hydroxide/Mg Hydroxide (Mylanta Plus Xs) 15 ml PRN AFTMEALHC  PRN PO 

DYSPEPSIA;  Start 12/21/18 at 18:45


Magnesium Hydroxide (Milk Of Magnesia) 2,400 mg PRN QHS  PRN PO CONSTIPATION;  

Start 12/21/18 at 18:45


Aspirin (Aspirin Enteric Coated) 325 mg DAILY PO  Last administered on 1/3/19at 

08:22;  Start 12/22/18 at 09:00


Atorvastatin Calcium (Lipitor) 20 mg QHS PO  Last administered on 1/3/19at 19:44

;  Start 12/21/18 at 21:00


Estrogens Conjugated (Premarin) 0.5 louis PRN DAILY  PRN VG vaginal dryness;  

Start 12/21/18 at 19:15


Acetaminophen/ Hydrocodone Bitart (Lortab 5/325) 1 tab PRN Q12HR  PRN PO PAIN 

Last administered on 12/24/18at 09:06;  Start 12/21/18 at 19:15;  Stop 12/24/18 

at 15:02;  Status DC


Ipratropium Bromide (Atrovent) 0.2 mg PRN Q6HRS  PRN IH SHORTNESS OF BREATH;  

Start 12/21/18 at 19:15


Levothyroxine Sodium (Synthroid) 75 mcg DAILYAC PO  Last administered on 12/22/ 18at 08:40;  Start 12/22/18 at 07:30;  Stop 12/22/18 at 12:26;  Status DC


Lisinopril (Prinivil) 10 mg DAILY PO  Last administered on 1/3/19at 08:23;  

Start 12/22/18 at 09:00


Acetaminophen (Tylenol) 650 mg PRN Q6HRS  PRN PO PAIN / TEMP Last administered 

on 1/1/19at 16:35;  Start 12/21/18 at 19:45


Non-Formulary Medication (Albuterol Sulfate (Albuterol Sulfate Conc Neb Soln)) 

2.5 mg PRN Q6HRS  PRN NEB SHORTNESS OF BREATH;  Start 12/21/18 at 19:15;  

Status UNV


Amlodipine Besylate (Norvasc) 10 mg DAILY PO  Last administered on 12/25/18at 08

:38;  Start 12/22/18 at 09:00;  Stop 12/25/18 at 12:27;  Status DC


Non-Formulary Medication (Budesonide/ Formoterol Fumarate (Symbicort 160-4.5 

Mcg Inhaler)) 2 puff BID IH ;  Start 12/21/18 at 21:00;  Status UNV


Carvedilol (Coreg) 6.25 mg BIDWMEALS PO  Last administered on 1/3/19at 16:47;  

Start 12/21/18 at 20:00


Divalproex Sodium (Depakote Er) 750 mg QHS PO  Last administered on 12/21/18at 

20:53;  Start 12/21/18 at 21:00;  Stop 12/22/18 at 21:13;  Status DC


Docusate Sodium (Colace) 100 mg PRN DAILY  PRN PO HARD STOOLS;  Start 12/21/18 

at 19:45


Donepezil HCl (Aricept) 10 mg QHS PO  Last administered on 1/3/19at 19:44;  

Start 12/21/18 at 21:00


Enoxaparin Sodium (Lovenox 40mg Syringe) 40 mg DAILY SQ  Last administered on 1/

3/19at 08:25;  Start 12/22/18 at 09:00


Hydralazine HCl (Apresoline) 10 mg TID PO  Last administered on 12/25/18at 08:39

;  Start 12/21/18 at 21:00;  Stop 12/25/18 at 12:27;  Status DC


Multivitamins/ Calcium (Thera-M Plus) 1 tab DAILY PO  Last administered on 1/3/

19at 08:23;  Start 12/22/18 at 09:00


Olanzapine (ZyPREXA) 5 mg PRN DAILY  PRN PO ANXIETY/AGITATION Last administered 

on 12/23/18at 10:50;  Start 12/22/18 at 09:00


Pantoprazole Sodium (Protonix) 40 mg DAILYAC PO  Last administered on 1/3/19at 

08:23;  Start 12/22/18 at 07:30


Phenazopyridine HCl (Pyridium) 100 mg PRN TID  PRN PO URINARY PAIN;  Start 12/21 /18 at 19:45


Polyethylene Glycol (miraLAX) 17 gm PRN BID  PRN PO CONSTIPATION;  Start 12/22/ 18 at 09:00


Sodium Chloride (Saline Mist Nasal) 1 louis QID NS ;  Start 12/21/18 at 21:00;  

Status Cancel


Trazodone HCl (Desyrel) 25 mg PRN BID  PRN PO ANXIETY/AGITATION;  Start 12/21/ 18 at 19:45


Trazodone HCl (Desyrel) 50 mg PRN QHS  PRN PO INSOMNIA;  Start 12/21/18 at 19:45


Lidocaine (Lidoderm) 1 patch DAILY TD  Last administered on 1/3/19at 08:26;  

Start 12/22/18 at 09:00


Multivitamins/ Minerals (I-Irina) 1 tab DAILY PO  Last administered on 1/3/19at 

08:23;  Start 12/22/18 at 09:00


Miscellaneous (Lidoderm Patch Removal) 1 ea QHS MC  Last administered on 1/3/

19at 19:44;  Start 12/21/18 at 21:00


Albuterol Sulfate (Ventolin) 2.5 mg PRN Q6HRS  PRN NEB SHORTNESS OF BREATH;  

Start 12/21/18 at 19:45


Albuterol Sulfate (Ventolin) 2.5 mg RTQID NEB  Last administered on 1/3/19at 21:

12;  Start 12/21/18 at 20:00


Budesonide (Pulmicort) 0.5 mg RTBID NEB  Last administered on 1/3/19at 21:12;  

Start 12/21/18 at 20:00


Sodium Chloride (Ayr Saline Nasal) 2 louis QID NS ;  Start 12/21/18 at 21:00;  

Stop 12/22/18 at 13:08;  Status DC


Levothyroxine Sodium (Synthroid) 75 mcg DAILY06 PO  Last administered on 1/3/

19at 06:05;  Start 12/23/18 at 06:00


Sodium Chloride (Saline Mist Nasal) 1 louis PRN QID  PRN NS NASAL CONGESTION;  

Start 12/22/18 at 13:15


Divalproex Sodium (Depakote Sprinkles) 750 mg HS PO  Last administered on 12/23/ 18at 20:20;  Start 12/22/18 at 21:30;  Stop 12/24/18 at 14:27;  Status DC


Sertraline HCl (Zoloft) 25 mg DAILY PO  Last administered on 12/25/18at 08:39;  

Start 12/23/18 at 09:00;  Stop 12/25/18 at 09:01;  Status DC


Sertraline HCl (Zoloft) 50 mg DAILY PO  Last administered on 1/3/19at 08:23;  

Start 12/26/18 at 09:00


Divalproex Sodium (Depakote Sprinkles) 500 mg BID94 PO  Last administered on 12/ 24/18at 07:55;  Start 12/24/18 at 09:00;  Stop 12/24/18 at 14:27;  Status DC


Quetiapine Fumarate (SEROquel) 12.5 mg 1300 PO  Last administered on 1/3/19at 14

:02;  Start 12/24/18 at 13:00


Divalproex Sodium (Depakote Sprinkles) 500 mg BID PO  Last administered on 12/26 /18at 09:59;  Start 12/24/18 at 21:00;  Stop 12/26/18 at 17:43;  Status DC


Acetaminophen/ Hydrocodone Bitart (Lortab 5/325) 1 tab PRN Q6HRS  PRN PO PAIN 

Last administered on 1/3/19at 19:52;  Start 12/24/18 at 15:15


Olanzapine (ZyPREXA ZYDIS) 2.5 mg PRN Q2HR  PRN PO PSYCHOSIS;  Start 12/24/18 

at 19:15


Divalproex Sodium (Depakote Sprinkles) 625 mg BID PO  Last administered on 1/3/

19at 19:44;  Start 12/26/18 at 21:00


Ondansetron HCl (Zofran Odt) 4 mg PRN Q8HRS  PRN PO NAUSEA/VOMITING Last 

administered on 1/3/19at 08:30;  Start 1/3/19 at 08:30





Active Scripts


Active


Reported


[occuvite]   1 Tab PO DAILY


Pyridium (Phenazopyridine Hcl) 100 Mg Tablet 100 Mg PO PRN TID PRN


Trazodone Hcl 50 Mg Tablet 25 Mg PO PRN QHS PRN


Premarin (Estrogens, Conjugated) 30 Gm Cream.appl 0.5 Gm VG PRN DAILY PRN


Multivitamins (Multivitamin) 1 Each Tablet 1 Tab PO DAILY


Trazodone Hcl 50 Mg Tablet 25 Mg PO BID PRN


Ayr Saline (Sodium Chloride) 50 Ml Drops 2 Drop NS QID


Miralax (Polyethylene Glycol 3350) 17 Gm Powd.pack 17 Gm PO PRN BID PRN


Protonix (Pantoprazole Sodium) 40 Mg Tablet. 40 Mg PO DAILY


Zyprexa (Olanzapine) 5 Mg Tablet 5 Mg PO PRN DAILY PRN


Lisinopril 10 Mg Tablet 10 Mg PO DAILY


[lidoderm]   1 Patch TD DAILY


Levothyroxine Sodium 75 Mcg Tablet 75 Mcg PO DAILYAC


Ipratropium Bromide 0.2 Mg/1 Ml Solution 0.2 Mg IH PRN Q6HRS PRN


Hydrocodone-Apap 5-325  ** (Hydrocodone Bit/Acetaminophen) 1 Each Tablet 1 Tab 

PO PRN Q12HR PRN


Hydralazine Hcl 10 Mg Tablet 10 Mg PO TID


Lovenox (Enoxaparin Sodium) 40 Mg/0.4 Ml Disp.syrin 40 Mg SQ DAILY


Donepezil Hcl 10 Mg Tablet 10 Mg PO HS


Colace (Docusate Sodium) 100 Mg Capsule 100 Mg PO PRN DAILY PRN


Depakote Er (Divalproex Sodium) 500 Mg Tab.er.24h 750 Mg PO HS


Coreg  ** (Carvedilol) 6.25 Mg Tablet 6.25 Mg PO BIDWMEALS


Symbicort 160-4.5 Mcg Inhaler (Budesonide/Formoterol Fumarate) 10.2 Gm 

Hfa.aer.ad 2 Puff IH BID


Lipitor (Atorvastatin Calcium) 20 Mg Tablet 20 Mg PO QHS


Aspirin Ec (Aspirin) 325 Mg Tablet. 325 Mg PO DAILY


Amlodipine Besylate 10 Mg Tablet 10 Mg PO DAILY


Albuterol Sulfate Conc Neb Soln (Albuterol Sulfate) 2.5 Mg/0.5 Ml Vial.neb 2.5 

Mg NEB PRN Q6HRS PRN


Acetaminophen 650 Mg/20.3 Ml Solution 650 Mg PO PRN Q6HRS PRN


I have reviewed the current psychotropics carefully including drug 

interactions.  Risk benefit ratio favors no change other than as noted in my 

dictated progress note.





Diagnosis:


Problems:  


(1) Anxiety disorder


(2) Major depressive disorder, recurrent episode


(3) Psychosis, atypical


(4) Impulse control disorder











JULY ZAMUDIO MD Pradeep 3, 2019 22:59

## 2019-01-04 VITALS — SYSTOLIC BLOOD PRESSURE: 128 MMHG | DIASTOLIC BLOOD PRESSURE: 62 MMHG

## 2019-01-04 VITALS — SYSTOLIC BLOOD PRESSURE: 136 MMHG | DIASTOLIC BLOOD PRESSURE: 79 MMHG

## 2019-01-04 LAB
ALBUMIN SERPL-MCNC: 2.3 G/DL (ref 3.4–5)
ALBUMIN/GLOB SERPL: 0.7 {RATIO} (ref 1–1.7)
ALP SERPL-CCNC: 43 U/L (ref 46–116)
ALT SERPL-CCNC: 17 U/L (ref 14–59)
ANION GAP SERPL CALC-SCNC: 5 MMOL/L (ref 6–14)
AST SERPL-CCNC: 17 U/L (ref 15–37)
BASOPHILS # BLD AUTO: 0 X10^3/UL (ref 0–0.2)
BASOPHILS NFR BLD: 1 % (ref 0–3)
BILIRUB SERPL-MCNC: 0.4 MG/DL (ref 0.2–1)
BUN/CREAT SERPL: 16 (ref 6–20)
CA-I SERPL ISE-MCNC: 11 MG/DL (ref 7–20)
CALCIUM SERPL-MCNC: 8.2 MG/DL (ref 8.5–10.1)
CHLORIDE SERPL-SCNC: 102 MMOL/L (ref 98–107)
CO2 SERPL-SCNC: 33 MMOL/L (ref 21–32)
CREAT SERPL-MCNC: 0.7 MG/DL (ref 0.6–1)
EOSINOPHIL NFR BLD: 0.1 X10^3/UL (ref 0–0.7)
EOSINOPHIL NFR BLD: 3 % (ref 0–3)
ERYTHROCYTE [DISTWIDTH] IN BLOOD BY AUTOMATED COUNT: 15.1 % (ref 11.5–14.5)
GFR SERPLBLD BASED ON 1.73 SQ M-ARVRAT: 79.2 ML/MIN
GLOBULIN SER-MCNC: 3.2 G/DL (ref 2.2–3.8)
GLUCOSE SERPL-MCNC: 94 MG/DL (ref 70–99)
HCT VFR BLD CALC: 36.3 % (ref 36–47)
HGB BLD-MCNC: 12 G/DL (ref 12–15.5)
LYMPHOCYTES # BLD: 0.6 X10^3/UL (ref 1–4.8)
LYMPHOCYTES NFR BLD AUTO: 12 % (ref 24–48)
MCH RBC QN AUTO: 31 PG (ref 25–35)
MCHC RBC AUTO-ENTMCNC: 33 G/DL (ref 31–37)
MCV RBC AUTO: 93 FL (ref 79–100)
MONO #: 0.7 X10^3/UL (ref 0–1.1)
MONOCYTES NFR BLD: 15 % (ref 0–9)
NEUT #: 3.5 X10^3UL (ref 1.8–7.7)
NEUTROPHILS NFR BLD AUTO: 70 % (ref 31–73)
PLATELET # BLD AUTO: 142 X10^3/UL (ref 140–400)
POTASSIUM SERPL-SCNC: 4.2 MMOL/L (ref 3.5–5.1)
PROT SERPL-MCNC: 5.5 G/DL (ref 6.4–8.2)
RBC # BLD AUTO: 3.9 X10^6/UL (ref 3.5–5.4)
SODIUM SERPL-SCNC: 140 MMOL/L (ref 136–145)
WBC # BLD AUTO: 5.1 X10^3/UL (ref 4–11)

## 2019-01-04 RX ADMIN — ALBUTEROL SULFATE SCH MG: 108 AEROSOL, METERED RESPIRATORY (INHALATION) at 20:27

## 2019-01-04 RX ADMIN — ENOXAPARIN SODIUM SCH MG: 100 INJECTION SUBCUTANEOUS at 07:55

## 2019-01-04 RX ADMIN — LISINOPRIL SCH MG: 10 TABLET ORAL at 07:57

## 2019-01-04 RX ADMIN — HYDROCODONE BITARTRATE AND ACETAMINOPHEN PRN TAB: 5; 325 TABLET ORAL at 08:09

## 2019-01-04 RX ADMIN — LIDOCAINE SCH PATCH: 50 PATCH CUTANEOUS at 07:56

## 2019-01-04 RX ADMIN — ACETAMINOPHEN PRN MG: 325 TABLET, FILM COATED ORAL at 06:01

## 2019-01-04 RX ADMIN — ASPIRIN SCH MG: 325 TABLET, DELAYED RELEASE ORAL at 07:53

## 2019-01-04 RX ADMIN — ATORVASTATIN CALCIUM SCH MG: 20 TABLET, FILM COATED ORAL at 19:44

## 2019-01-04 RX ADMIN — BUDESONIDE SCH MG: 0.5 SUSPENSION RESPIRATORY (INHALATION) at 20:27

## 2019-01-04 RX ADMIN — MULTIPLE VITAMINS W/ MINERALS TAB SCH TAB: TAB at 07:54

## 2019-01-04 RX ADMIN — CARVEDILOL SCH MG: 6.25 TABLET, FILM COATED ORAL at 17:17

## 2019-01-04 RX ADMIN — PANTOPRAZOLE SODIUM SCH MG: 40 TABLET, DELAYED RELEASE ORAL at 07:52

## 2019-01-04 RX ADMIN — BUDESONIDE SCH MG: 0.5 SUSPENSION RESPIRATORY (INHALATION) at 09:42

## 2019-01-04 RX ADMIN — HYDROCODONE BITARTRATE AND ACETAMINOPHEN PRN TAB: 5; 325 TABLET ORAL at 19:52

## 2019-01-04 RX ADMIN — SERTRALINE SCH MG: 25 TABLET, FILM COATED ORAL at 07:55

## 2019-01-04 RX ADMIN — Medication SCH EA: at 19:43

## 2019-01-04 RX ADMIN — CARVEDILOL SCH MG: 6.25 TABLET, FILM COATED ORAL at 07:53

## 2019-01-04 RX ADMIN — LEVOTHYROXINE SODIUM SCH MCG: 75 TABLET ORAL at 06:01

## 2019-01-04 RX ADMIN — DIVALPROEX SODIUM SCH MG: 125 CAPSULE, COATED PELLETS ORAL at 19:44

## 2019-01-04 RX ADMIN — I-VITE, TAB 1000-60-2MG (60/BT) SCH TAB: TAB at 07:57

## 2019-01-04 RX ADMIN — DIVALPROEX SODIUM SCH MG: 125 CAPSULE, COATED PELLETS ORAL at 07:53

## 2019-01-04 RX ADMIN — ALBUTEROL SULFATE SCH MG: 108 AEROSOL, METERED RESPIRATORY (INHALATION) at 06:07

## 2019-01-04 RX ADMIN — DONEPEZIL HYDROCHLORIDE SCH MG: 10 TABLET ORAL at 19:44

## 2019-01-04 RX ADMIN — QUETIAPINE FUMARATE SCH MG: 25 TABLET, FILM COATED ORAL at 12:12

## 2019-01-04 RX ADMIN — ALBUTEROL SULFATE SCH MG: 108 AEROSOL, METERED RESPIRATORY (INHALATION) at 09:42

## 2019-01-04 RX ADMIN — ALBUTEROL SULFATE SCH MG: 108 AEROSOL, METERED RESPIRATORY (INHALATION) at 15:33

## 2019-01-04 NOTE — PN
DATE:  01/02/2019



PSYCHIATRIC PROGRESS NOTE



This is a late entry 01/02/2019, covers elements not covered in my initial note.



SUBJECTIVE:  I met with the patient at length in her room in the evening of

01/02/2019.  The patient slept 5-3/4 hours previous evening.  She is complaining

of some back pain, received hydrocodone _____, had some shortness of breath, on

O2 supplements, impaired ambulation, in wheelchair, otherwise pleasant.  Spends

much time in bed other than when she is in the day room.



REVIEW OF SYSTEMS:  No CV, , GI, ENT system symptoms on review other than

above.



MENTAL STATUS EXAM:  Oriented to herself and situation.  Speech is coherent,

abstraction fair, computation impaired, language function intact, attention span

short.  Mood and affect still somewhat anxious.  I questioned her closely on

prior symptoms of bipolar disorder.  There are no records we can obtain.  We

checked with the family as well and she has functioned reasonably well as the

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



LABORATORY DATA:  Reviewed.



IMPRESSION:  Major neurocognitive disorder, early Alzheimer, vascular with

depression; anxiety disorder, unspecified, possible bipolar 1 disorder, mixed. 

Rest unchanged.



PLAN:  Continue psychotropics from initial note and adjust Depakote further, but

currently level is therapeutic at 54.





______________________________

MAN ARLENE ZAMUDIO MD



DR:  GINNA/christiano  JOB#:  [id number] / [id number]

DD:  01/04/2019 11:58  DT:  01/04/2019 16:28

## 2019-01-04 NOTE — PDOC
Exam


Note:


Marquise Note:


Please also refer to the separate dictated note~for this date of service 

dictated separately.~Patient seen individually. Discussed the patient with 

Nursing staff reviewed the chart.~Reviewed interim history and current 

functioning. Reviewed vital signs,~Labs/ Radiology~and current medications 

noted below. Continue current treatment with the changes noted in the dictated 

addendum note





Assessment:


Vital Signs:





 Vital Signs








  Date Time  Temp Pulse Resp B/P (MAP) Pulse Ox O2 Delivery O2 Flow Rate FiO2


 


1/4/19 20:52     96   


 


1/4/19 20:10      Nasal Cannula 2.0 


 


1/4/19 17:17  67  136/79    


 


1/4/19 16:53 97.8  16     








I&O











Intake and Output 


 


 1/4/19





 07:01


 


Intake Total 300 ml


 


Balance 300 ml


 


 


 


Intake Oral 300 ml








Labs:





 Laboratory Tests








Test


 1/4/19


06:43


 


White Blood Count


 5.1 x10^3/uL


(4.0-11.0)


 


Red Blood Count


 3.90 x10^6/uL


(3.50-5.40)


 


Hemoglobin


 12.0 g/dL


(12.0-15.5)


 


Hematocrit


 36.3 %


(36.0-47.0)


 


Mean Corpuscular Volume


 93 fL ()





 


Mean Corpuscular Hemoglobin 31 pg (25-35)  


 


Mean Corpuscular Hemoglobin


Concent 33 g/dL


(31-37)


 


Red Cell Distribution Width


 15.1 %


(11.5-14.5)  H


 


Platelet Count


 142 x10^3/uL


(140-400)


 


Neutrophils (%) (Auto) 70 % (31-73)  


 


Lymphocytes (%) (Auto) 12 % (24-48)  L


 


Monocytes (%) (Auto) 15 % (0-9)  H


 


Eosinophils (%) (Auto) 3 % (0-3)  


 


Basophils (%) (Auto) 1 % (0-3)  


 


Neutrophils # (Auto)


 3.5 x10^3uL


(1.8-7.7)


 


Lymphocytes # (Auto)


 0.6 x10^3/uL


(1.0-4.8)  L


 


Monocytes # (Auto)


 0.7 x10^3/uL


(0.0-1.1)


 


Eosinophils # (Auto)


 0.1 x10^3/uL


(0.0-0.7)


 


Basophils # (Auto)


 0.0 x10^3/uL


(0.0-0.2)


 


Sodium Level


 140 mmol/L


(136-145)


 


Potassium Level


 4.2 mmol/L


(3.5-5.1)


 


Chloride Level


 102 mmol/L


()


 


Carbon Dioxide Level


 33 mmol/L


(21-32)  H


 


Anion Gap 5 (6-14)  L


 


Blood Urea Nitrogen


 11 mg/dL


(7-20)


 


Creatinine


 0.7 mg/dL


(0.6-1.0)


 


Estimated GFR


(Cockcroft-Gault) 79.2  





 


BUN/Creatinine Ratio 16 (6-20)  


 


Glucose Level


 94 mg/dL


(70-99)


 


Calcium Level


 8.2 mg/dL


(8.5-10.1)  L


 


Total Bilirubin


 0.4 mg/dL


(0.2-1.0)


 


Aspartate Amino Transferase


(AST) 17 U/L (15-37)





 


Alanine Aminotransferase (ALT)


 17 U/L (14-59)





 


Alkaline Phosphatase


 43 U/L


()  L


 


Total Protein


 5.5 g/dL


(6.4-8.2)  L


 


Albumin


 2.3 g/dL


(3.4-5.0)  L


 


Albumin/Globulin Ratio


 0.7 (1.0-1.7)


L











Current Medications:


Meds:





Current Medications


Multi-Ingredient Ointment (Analgesic Balm) 1 louis PRN QID  PRN TP MUSCLE PAIN;  

Start 12/21/18 at 18:45


Al Hydroxide/Mg Hydroxide (Mylanta Plus Xs) 15 ml PRN AFTMEALHC  PRN PO 

DYSPEPSIA;  Start 12/21/18 at 18:45


Magnesium Hydroxide (Milk Of Magnesia) 2,400 mg PRN QHS  PRN PO CONSTIPATION;  

Start 12/21/18 at 18:45


Aspirin (Aspirin Enteric Coated) 325 mg DAILY PO  Last administered on 1/4/19at 

07:53;  Start 12/22/18 at 09:00


Atorvastatin Calcium (Lipitor) 20 mg QHS PO  Last administered on 1/4/19at 19:44

;  Start 12/21/18 at 21:00


Estrogens Conjugated (Premarin) 0.5 louis PRN DAILY  PRN VG vaginal dryness;  

Start 12/21/18 at 19:15


Acetaminophen/ Hydrocodone Bitart (Lortab 5/325) 1 tab PRN Q12HR  PRN PO PAIN 

Last administered on 12/24/18at 09:06;  Start 12/21/18 at 19:15;  Stop 12/24/18 

at 15:02;  Status DC


Ipratropium Bromide (Atrovent) 0.2 mg PRN Q6HRS  PRN IH SHORTNESS OF BREATH;  

Start 12/21/18 at 19:15


Levothyroxine Sodium (Synthroid) 75 mcg DAILYAC PO  Last administered on 12/22/ 18at 08:40;  Start 12/22/18 at 07:30;  Stop 12/22/18 at 12:26;  Status DC


Lisinopril (Prinivil) 10 mg DAILY PO  Last administered on 1/4/19at 07:57;  

Start 12/22/18 at 09:00


Acetaminophen (Tylenol) 650 mg PRN Q6HRS  PRN PO PAIN / TEMP Last administered 

on 1/4/19at 06:01;  Start 12/21/18 at 19:45


Non-Formulary Medication (Albuterol Sulfate (Albuterol Sulfate Conc Neb Soln)) 

2.5 mg PRN Q6HRS  PRN NEB SHORTNESS OF BREATH;  Start 12/21/18 at 19:15;  

Status UNV


Amlodipine Besylate (Norvasc) 10 mg DAILY PO  Last administered on 12/25/18at 08

:38;  Start 12/22/18 at 09:00;  Stop 12/25/18 at 12:27;  Status DC


Non-Formulary Medication (Budesonide/ Formoterol Fumarate (Symbicort 160-4.5 

Mcg Inhaler)) 2 puff BID IH ;  Start 12/21/18 at 21:00;  Status UNV


Carvedilol (Coreg) 6.25 mg BIDWMEALS PO  Last administered on 1/4/19at 17:17;  

Start 12/21/18 at 20:00


Divalproex Sodium (Depakote Er) 750 mg QHS PO  Last administered on 12/21/18at 

20:53;  Start 12/21/18 at 21:00;  Stop 12/22/18 at 21:13;  Status DC


Docusate Sodium (Colace) 100 mg PRN DAILY  PRN PO HARD STOOLS;  Start 12/21/18 

at 19:45


Donepezil HCl (Aricept) 10 mg QHS PO  Last administered on 1/4/19at 19:44;  

Start 12/21/18 at 21:00


Enoxaparin Sodium (Lovenox 40mg Syringe) 40 mg DAILY SQ  Last administered on 1/ 4/19at 07:55;  Start 12/22/18 at 09:00


Hydralazine HCl (Apresoline) 10 mg TID PO  Last administered on 12/25/18at 08:39

;  Start 12/21/18 at 21:00;  Stop 12/25/18 at 12:27;  Status DC


Multivitamins/ Calcium (Thera-M Plus) 1 tab DAILY PO  Last administered on 1/4/ 19at 07:54;  Start 12/22/18 at 09:00


Olanzapine (ZyPREXA) 5 mg PRN DAILY  PRN PO ANXIETY/AGITATION Last administered 

on 12/23/18at 10:50;  Start 12/22/18 at 09:00


Pantoprazole Sodium (Protonix) 40 mg DAILYAC PO  Last administered on 1/4/19at 

07:52;  Start 12/22/18 at 07:30


Phenazopyridine HCl (Pyridium) 100 mg PRN TID  PRN PO URINARY PAIN;  Start 12/21 /18 at 19:45


Polyethylene Glycol (miraLAX) 17 gm PRN BID  PRN PO CONSTIPATION;  Start 12/22/ 18 at 09:00


Sodium Chloride (Saline Mist Nasal) 1 louis QID NS ;  Start 12/21/18 at 21:00;  

Status Cancel


Trazodone HCl (Desyrel) 25 mg PRN BID  PRN PO ANXIETY/AGITATION;  Start 12/21/ 18 at 19:45


Trazodone HCl (Desyrel) 50 mg PRN QHS  PRN PO INSOMNIA;  Start 12/21/18 at 19:45


Lidocaine (Lidoderm) 1 patch DAILY TD  Last administered on 1/4/19at 07:56;  

Start 12/22/18 at 09:00


Multivitamins/ Minerals (I-Irina) 1 tab DAILY PO  Last administered on 1/4/19at 

07:57;  Start 12/22/18 at 09:00


Miscellaneous (Lidoderm Patch Removal) 1 ea QHS MC  Last administered on 1/4/ 19at 19:43;  Start 12/21/18 at 21:00


Albuterol Sulfate (Ventolin) 2.5 mg PRN Q6HRS  PRN NEB SHORTNESS OF BREATH;  

Start 12/21/18 at 19:45


Albuterol Sulfate (Ventolin) 2.5 mg RTQID NEB  Last administered on 1/4/19at 20:

27;  Start 12/21/18 at 20:00


Budesonide (Pulmicort) 0.5 mg RTBID NEB  Last administered on 1/4/19at 20:27;  

Start 12/21/18 at 20:00


Sodium Chloride (Ayr Saline Nasal) 2 louis QID NS ;  Start 12/21/18 at 21:00;  

Stop 12/22/18 at 13:08;  Status DC


Levothyroxine Sodium (Synthroid) 75 mcg DAILY06 PO  Last administered on 1/4/ 19at 06:01;  Start 12/23/18 at 06:00


Sodium Chloride (Saline Mist Nasal) 1 louis PRN QID  PRN NS NASAL CONGESTION;  

Start 12/22/18 at 13:15


Divalproex Sodium (Depakote Sprinkles) 750 mg HS PO  Last administered on 12/23/ 18at 20:20;  Start 12/22/18 at 21:30;  Stop 12/24/18 at 14:27;  Status DC


Sertraline HCl (Zoloft) 25 mg DAILY PO  Last administered on 12/25/18at 08:39;  

Start 12/23/18 at 09:00;  Stop 12/25/18 at 09:01;  Status DC


Sertraline HCl (Zoloft) 50 mg DAILY PO  Last administered on 1/4/19at 07:55;  

Start 12/26/18 at 09:00


Divalproex Sodium (Depakote Sprinkles) 500 mg BID94 PO  Last administered on 12/ 24/18at 07:55;  Start 12/24/18 at 09:00;  Stop 12/24/18 at 14:27;  Status DC


Quetiapine Fumarate (SEROquel) 12.5 mg 1300 PO  Last administered on 1/4/19at 12

:12;  Start 12/24/18 at 13:00


Divalproex Sodium (Depakote Sprinkles) 500 mg BID PO  Last administered on 12/26 /18at 09:59;  Start 12/24/18 at 21:00;  Stop 12/26/18 at 17:43;  Status DC


Acetaminophen/ Hydrocodone Bitart (Lortab 5/325) 1 tab PRN Q6HRS  PRN PO PAIN 

Last administered on 1/4/19at 19:52;  Start 12/24/18 at 15:15


Olanzapine (ZyPREXA ZYDIS) 2.5 mg PRN Q2HR  PRN PO PSYCHOSIS;  Start 12/24/18 

at 19:15


Divalproex Sodium (Depakote Sprinkles) 625 mg BID PO  Last administered on 1/4/ 19at 19:44;  Start 12/26/18 at 21:00


Ondansetron HCl (Zofran Odt) 4 mg PRN Q8HRS  PRN PO NAUSEA/VOMITING Last 

administered on 1/3/19at 08:30;  Start 1/3/19 at 08:30





Active Scripts


Active


Reported


[occuvite]   1 Tab PO DAILY


Pyridium (Phenazopyridine Hcl) 100 Mg Tablet 100 Mg PO PRN TID PRN


Trazodone Hcl 50 Mg Tablet 25 Mg PO PRN QHS PRN


Premarin (Estrogens, Conjugated) 30 Gm Cream.appl 0.5 Gm VG PRN DAILY PRN


Multivitamins (Multivitamin) 1 Each Tablet 1 Tab PO DAILY


Trazodone Hcl 50 Mg Tablet 25 Mg PO BID PRN


Ayr Saline (Sodium Chloride) 50 Ml Drops 2 Drop NS QID


Miralax (Polyethylene Glycol 3350) 17 Gm Powd.pack 17 Gm PO PRN BID PRN


Protonix (Pantoprazole Sodium) 40 Mg Tablet.dr 40 Mg PO DAILY


Zyprexa (Olanzapine) 5 Mg Tablet 5 Mg PO PRN DAILY PRN


Lisinopril 10 Mg Tablet 10 Mg PO DAILY


[lidoderm]   1 Patch TD DAILY


Levothyroxine Sodium 75 Mcg Tablet 75 Mcg PO DAILYAC


Ipratropium Bromide 0.2 Mg/1 Ml Solution 0.2 Mg IH PRN Q6HRS PRN


Hydrocodone-Apap 5-325  ** (Hydrocodone Bit/Acetaminophen) 1 Each Tablet 1 Tab 

PO PRN Q12HR PRN


Hydralazine Hcl 10 Mg Tablet 10 Mg PO TID


Lovenox (Enoxaparin Sodium) 40 Mg/0.4 Ml Disp.syrin 40 Mg SQ DAILY


Donepezil Hcl 10 Mg Tablet 10 Mg PO HS


Colace (Docusate Sodium) 100 Mg Capsule 100 Mg PO PRN DAILY PRN


Depakote Er (Divalproex Sodium) 500 Mg Tab.er.24h 750 Mg PO HS


Coreg  ** (Carvedilol) 6.25 Mg Tablet 6.25 Mg PO BIDWMEALS


Symbicort 160-4.5 Mcg Inhaler (Budesonide/Formoterol Fumarate) 10.2 Gm 

Hfa.aer.ad 2 Puff IH BID


Lipitor (Atorvastatin Calcium) 20 Mg Tablet 20 Mg PO QHS


Aspirin Ec (Aspirin) 325 Mg Tablet.dr 325 Mg PO DAILY


Amlodipine Besylate 10 Mg Tablet 10 Mg PO DAILY


Albuterol Sulfate Conc Neb Soln (Albuterol Sulfate) 2.5 Mg/0.5 Ml Vial.neb 2.5 

Mg NEB PRN Q6HRS PRN


Acetaminophen 650 Mg/20.3 Ml Solution 650 Mg PO PRN Q6HRS PRN


I have reviewed the current psychotropics carefully including drug 

interactions.  Risk benefit ratio favors no change other than as noted in my 

dictated progress note.





Diagnosis:


Problems:  


(1) Anxiety disorder


(2) Major depressive disorder, recurrent episode


(3) Psychosis, atypical


(4) Impulse control disorder











JULY ZAMUDIO MD Jan 4, 2019 22:42

## 2019-01-04 NOTE — PN
DATE:  01/03/2019



PSYCHIATRIC PROGRESS NOTE



This late entry 01/03/2019 covers elements not covered in my initial note.



SUBJECTIVE:  I met with the patient in the evening and staffed at a treatment

team meeting with the entire team in the morning.  The patient slept 8-3/4 hours

previous evening.  At the treatment team meeting, Allyson, patient's

daughter-in-law, attended.  Allyson still believes patient has a long history of

bipolar disorder.  We are unable to obtain any past records of outpatient

psychiatric treatment.  The patient denies being diagnosed with bipolar;

however, we will continue to explore this.  Reportedly, 2 years ago, she was

trying to drive, was quite confused, was found in the parking lot at [facility]. 

Police had to be called to help her home.



REVIEW OF SYSTEMS:  Shortness of breath on O2 supplements.  I met with her at

length in the evening.  Ambulation impaired, in wheelchair.  No CV, GI, , eye

system symptoms on review.



MENTAL STATUS EXAM:  Oriented to herself and situation.  Speech is coherent at

times, somewhat pressured.  Abstraction fair, computation impaired, language

function intact, attention span short.  Mood and affect despite the above is

showing some improvement.



LABORATORY DATA:  Reviewed.



IMPRESSION:  Major neurocognitive disorder, early Alzheimer, vascular with

depression, delusions, history of bipolar 1 disorder, unspecified; anxiety

disorder, unspecified.  Rest unchanged.



PLAN:  No change from initial note.  Adjust further as clinically indicated.





______________________________

MAN ARLENE ZAMUDIO MD



DR:  GINNA/christiano  JOB#:  [id number] / [id number]

DD:  01/04/2019 12:58  DT:  01/04/2019 18:27

## 2019-01-05 VITALS — DIASTOLIC BLOOD PRESSURE: 77 MMHG | SYSTOLIC BLOOD PRESSURE: 120 MMHG

## 2019-01-05 VITALS — SYSTOLIC BLOOD PRESSURE: 163 MMHG | DIASTOLIC BLOOD PRESSURE: 74 MMHG

## 2019-01-05 RX ADMIN — ASPIRIN SCH MG: 325 TABLET, DELAYED RELEASE ORAL at 07:56

## 2019-01-05 RX ADMIN — ALBUTEROL SULFATE SCH MG: 108 AEROSOL, METERED RESPIRATORY (INHALATION) at 20:00

## 2019-01-05 RX ADMIN — LEVOTHYROXINE SODIUM SCH MCG: 75 TABLET ORAL at 04:58

## 2019-01-05 RX ADMIN — QUETIAPINE FUMARATE SCH MG: 25 TABLET, FILM COATED ORAL at 12:52

## 2019-01-05 RX ADMIN — ALBUTEROL SULFATE SCH MG: 108 AEROSOL, METERED RESPIRATORY (INHALATION) at 05:13

## 2019-01-05 RX ADMIN — MULTIPLE VITAMINS W/ MINERALS TAB SCH TAB: TAB at 07:55

## 2019-01-05 RX ADMIN — ALBUTEROL SULFATE SCH MG: 108 AEROSOL, METERED RESPIRATORY (INHALATION) at 15:23

## 2019-01-05 RX ADMIN — BUDESONIDE SCH MG: 0.5 SUSPENSION RESPIRATORY (INHALATION) at 09:38

## 2019-01-05 RX ADMIN — ATORVASTATIN CALCIUM SCH MG: 20 TABLET, FILM COATED ORAL at 19:39

## 2019-01-05 RX ADMIN — PANTOPRAZOLE SODIUM SCH MG: 40 TABLET, DELAYED RELEASE ORAL at 07:56

## 2019-01-05 RX ADMIN — DIVALPROEX SODIUM SCH MG: 125 CAPSULE, COATED PELLETS ORAL at 07:55

## 2019-01-05 RX ADMIN — LISINOPRIL SCH MG: 10 TABLET ORAL at 07:56

## 2019-01-05 RX ADMIN — DONEPEZIL HYDROCHLORIDE SCH MG: 10 TABLET ORAL at 19:39

## 2019-01-05 RX ADMIN — LIDOCAINE SCH PATCH: 50 PATCH CUTANEOUS at 07:55

## 2019-01-05 RX ADMIN — CARVEDILOL SCH MG: 6.25 TABLET, FILM COATED ORAL at 17:17

## 2019-01-05 RX ADMIN — Medication SCH EA: at 19:40

## 2019-01-05 RX ADMIN — ENOXAPARIN SODIUM SCH MG: 100 INJECTION SUBCUTANEOUS at 07:55

## 2019-01-05 RX ADMIN — ALBUTEROL SULFATE SCH MG: 108 AEROSOL, METERED RESPIRATORY (INHALATION) at 09:38

## 2019-01-05 RX ADMIN — HYDROCODONE BITARTRATE AND ACETAMINOPHEN PRN TAB: 5; 325 TABLET ORAL at 08:40

## 2019-01-05 RX ADMIN — BUDESONIDE SCH MG: 0.5 SUSPENSION RESPIRATORY (INHALATION) at 20:00

## 2019-01-05 RX ADMIN — I-VITE, TAB 1000-60-2MG (60/BT) SCH TAB: TAB at 07:56

## 2019-01-05 RX ADMIN — DIVALPROEX SODIUM SCH MG: 125 CAPSULE, COATED PELLETS ORAL at 19:40

## 2019-01-05 RX ADMIN — SERTRALINE SCH MG: 25 TABLET, FILM COATED ORAL at 07:55

## 2019-01-05 RX ADMIN — CARVEDILOL SCH MG: 6.25 TABLET, FILM COATED ORAL at 07:56

## 2019-01-05 NOTE — PN
DATE:  01/05/2019



This note covers elements not covered in my initial note of 01/05/2019.



SUBJECTIVE:  I met with the patient in her room.  The patient slept 6 hours

previous night.  She has been somewhat irritable in the morning, agitated,

incontinent.  We will check a UA to rule out urinary tract infection.  She was

up during the day.  Later in the day, she was quite pleasant.



REVIEW OF SYSTEMS:  Impaired ambulation, shortness of breath on O2 supplements,

in a wheelchair.  No CV, , GI, eye system symptoms on review.



MENTAL STATUS EXAM:  The patient is oriented to herself and situation.  Speech

has some latency, coherent.  Abstraction fair, computation impaired, language

function intact, attention span short.  Mood and affect, still withdrawn, but

showing some improvement, less labile.



LABORATORY DATA:  Reviewed.



IMPRESSION:  Major depressive disorder, rule out bipolar 1 disorder, depressed;

cognitive disorder, unspecified.



PLAN:  No change from initial note.  Valproic acid level is therapeutic at 54. 

We will leave Depakote dosage unchanged.





______________________________

JULY ZAMUDIO MD



DR:  GINNA/christiano  JOB#:  [id number] / [id number]

DD:  01/05/2019 20:52  DT:  01/05/2019 21:59

## 2019-01-05 NOTE — PDOC
Exam


Note:


Marquise Note:


Please also refer to the separate dictated note~for this date of service 

dictated separately.~Patient seen individually. Discussed the patient with 

Nursing staff reviewed the chart.~Reviewed interim history and current 

functioning. Reviewed vital signs,~Labs/ Radiology~and current medications 

noted below. Continue current treatment with the changes noted in the dictated 

addendum note





Assessment:


Vital Signs:





 Vital Signs








  Date Time  Temp Pulse Resp B/P (MAP) Pulse Ox O2 Delivery O2 Flow Rate FiO2


 


1/5/19 17:17  84  120/77    


 


1/5/19 16:18 98.0  20  95 Nasal Cannula 2.0 








I&O











Intake and Output 


 


 1/5/19





 07:01


 


Intake Total 480 ml


 


Balance 480 ml


 


 


 


Intake Oral 480 ml


 


# Voids 1











Current Medications:


Meds:





Current Medications


Multi-Ingredient Ointment (Analgesic Balm) 1 louis PRN QID  PRN TP MUSCLE PAIN;  

Start 12/21/18 at 18:45


Al Hydroxide/Mg Hydroxide (Mylanta Plus Xs) 15 ml PRN AFTMEALHC  PRN PO 

DYSPEPSIA;  Start 12/21/18 at 18:45


Magnesium Hydroxide (Milk Of Magnesia) 2,400 mg PRN QHS  PRN PO CONSTIPATION;  

Start 12/21/18 at 18:45


Aspirin (Aspirin Enteric Coated) 325 mg DAILY PO  Last administered on 1/5/19at 

07:56;  Start 12/22/18 at 09:00


Atorvastatin Calcium (Lipitor) 20 mg QHS PO  Last administered on 1/5/19at 19:39

;  Start 12/21/18 at 21:00


Estrogens Conjugated (Premarin) 0.5 louis PRN DAILY  PRN VG vaginal dryness;  

Start 12/21/18 at 19:15


Acetaminophen/ Hydrocodone Bitart (Lortab 5/325) 1 tab PRN Q12HR  PRN PO PAIN 

Last administered on 12/24/18at 09:06;  Start 12/21/18 at 19:15;  Stop 12/24/18 

at 15:02;  Status DC


Ipratropium Bromide (Atrovent) 0.2 mg PRN Q6HRS  PRN IH SHORTNESS OF BREATH;  

Start 12/21/18 at 19:15


Levothyroxine Sodium (Synthroid) 75 mcg DAILYAC PO  Last administered on 12/22/ 18at 08:40;  Start 12/22/18 at 07:30;  Stop 12/22/18 at 12:26;  Status DC


Lisinopril (Prinivil) 10 mg DAILY PO  Last administered on 1/5/19at 07:56;  

Start 12/22/18 at 09:00


Acetaminophen (Tylenol) 650 mg PRN Q6HRS  PRN PO PAIN / TEMP Last administered 

on 1/4/19at 06:01;  Start 12/21/18 at 19:45


Non-Formulary Medication (Albuterol Sulfate (Albuterol Sulfate Conc Neb Soln)) 

2.5 mg PRN Q6HRS  PRN NEB SHORTNESS OF BREATH;  Start 12/21/18 at 19:15;  

Status UNV


Amlodipine Besylate (Norvasc) 10 mg DAILY PO  Last administered on 12/25/18at 08

:38;  Start 12/22/18 at 09:00;  Stop 12/25/18 at 12:27;  Status DC


Non-Formulary Medication (Budesonide/ Formoterol Fumarate (Symbicort 160-4.5 

Mcg Inhaler)) 2 puff BID IH ;  Start 12/21/18 at 21:00;  Status UNV


Carvedilol (Coreg) 6.25 mg BIDWMEALS PO  Last administered on 1/5/19at 17:17;  

Start 12/21/18 at 20:00


Divalproex Sodium (Depakote Er) 750 mg QHS PO  Last administered on 12/21/18at 

20:53;  Start 12/21/18 at 21:00;  Stop 12/22/18 at 21:13;  Status DC


Docusate Sodium (Colace) 100 mg PRN DAILY  PRN PO HARD STOOLS;  Start 12/21/18 

at 19:45


Donepezil HCl (Aricept) 10 mg QHS PO  Last administered on 1/5/19at 19:39;  

Start 12/21/18 at 21:00


Enoxaparin Sodium (Lovenox 40mg Syringe) 40 mg DAILY SQ  Last administered on 1/ 5/19at 07:55;  Start 12/22/18 at 09:00


Hydralazine HCl (Apresoline) 10 mg TID PO  Last administered on 12/25/18at 08:39

;  Start 12/21/18 at 21:00;  Stop 12/25/18 at 12:27;  Status DC


Multivitamins/ Calcium (Thera-M Plus) 1 tab DAILY PO  Last administered on 1/5/ 19at 07:55;  Start 12/22/18 at 09:00


Olanzapine (ZyPREXA) 5 mg PRN DAILY  PRN PO ANXIETY/AGITATION Last administered 

on 12/23/18at 10:50;  Start 12/22/18 at 09:00


Pantoprazole Sodium (Protonix) 40 mg DAILYAC PO  Last administered on 1/5/19at 

07:56;  Start 12/22/18 at 07:30


Phenazopyridine HCl (Pyridium) 100 mg PRN TID  PRN PO URINARY PAIN;  Start 12/21 /18 at 19:45


Polyethylene Glycol (miraLAX) 17 gm PRN BID  PRN PO CONSTIPATION;  Start 12/22/ 18 at 09:00


Sodium Chloride (Saline Mist Nasal) 1 louis QID NS ;  Start 12/21/18 at 21:00;  

Status Cancel


Trazodone HCl (Desyrel) 25 mg PRN BID  PRN PO ANXIETY/AGITATION;  Start 12/21/ 18 at 19:45


Trazodone HCl (Desyrel) 50 mg PRN QHS  PRN PO INSOMNIA;  Start 12/21/18 at 19:45


Lidocaine (Lidoderm) 1 patch DAILY TD  Last administered on 1/5/19at 07:55;  

Start 12/22/18 at 09:00


Multivitamins/ Minerals (I-Irina) 1 tab DAILY PO  Last administered on 1/5/19at 

07:56;  Start 12/22/18 at 09:00


Miscellaneous (Lidoderm Patch Removal) 1 ea QHS MC  Last administered on 1/5/ 19at 19:40;  Start 12/21/18 at 21:00


Albuterol Sulfate (Ventolin) 2.5 mg PRN Q6HRS  PRN NEB SHORTNESS OF BREATH;  

Start 12/21/18 at 19:45


Albuterol Sulfate (Ventolin) 2.5 mg RTQID NEB  Last administered on 1/5/19at 15:

23;  Start 12/21/18 at 20:00


Budesonide (Pulmicort) 0.5 mg RTBID NEB  Last administered on 1/5/19at 09:38;  

Start 12/21/18 at 20:00


Sodium Chloride (Ayr Saline Nasal) 2 louis QID NS ;  Start 12/21/18 at 21:00;  

Stop 12/22/18 at 13:08;  Status DC


Levothyroxine Sodium (Synthroid) 75 mcg DAILY06 PO  Last administered on 1/5/ 19at 04:58;  Start 12/23/18 at 06:00


Sodium Chloride (Saline Mist Nasal) 1 louis PRN QID  PRN NS NASAL CONGESTION;  

Start 12/22/18 at 13:15


Divalproex Sodium (Depakote Sprinkles) 750 mg HS PO  Last administered on 12/23/ 18at 20:20;  Start 12/22/18 at 21:30;  Stop 12/24/18 at 14:27;  Status DC


Sertraline HCl (Zoloft) 25 mg DAILY PO  Last administered on 12/25/18at 08:39;  

Start 12/23/18 at 09:00;  Stop 12/25/18 at 09:01;  Status DC


Sertraline HCl (Zoloft) 50 mg DAILY PO  Last administered on 1/5/19at 07:55;  

Start 12/26/18 at 09:00


Divalproex Sodium (Depakote Sprinkles) 500 mg BID94 PO  Last administered on 12/ 24/18at 07:55;  Start 12/24/18 at 09:00;  Stop 12/24/18 at 14:27;  Status DC


Quetiapine Fumarate (SEROquel) 12.5 mg 1300 PO  Last administered on 1/5/19at 12

:52;  Start 12/24/18 at 13:00


Divalproex Sodium (Depakote Sprinkles) 500 mg BID PO  Last administered on 12/26 /18at 09:59;  Start 12/24/18 at 21:00;  Stop 12/26/18 at 17:43;  Status DC


Acetaminophen/ Hydrocodone Bitart (Lortab 5/325) 1 tab PRN Q6HRS  PRN PO PAIN 

Last administered on 1/5/19at 08:40;  Start 12/24/18 at 15:15


Olanzapine (ZyPREXA ZYDIS) 2.5 mg PRN Q2HR  PRN PO PSYCHOSIS;  Start 12/24/18 

at 19:15


Divalproex Sodium (Depakote Sprinkles) 625 mg BID PO  Last administered on 1/5/ 19at 19:40;  Start 12/26/18 at 21:00


Ondansetron HCl (Zofran Odt) 4 mg PRN Q8HRS  PRN PO NAUSEA/VOMITING Last 

administered on 1/3/19at 08:30;  Start 1/3/19 at 08:30





Active Scripts


Active


Reported


[occuvite]   1 Tab PO DAILY


Pyridium (Phenazopyridine Hcl) 100 Mg Tablet 100 Mg PO PRN TID PRN


Trazodone Hcl 50 Mg Tablet 25 Mg PO PRN QHS PRN


Premarin (Estrogens, Conjugated) 30 Gm Cream.appl 0.5 Gm VG PRN DAILY PRN


Multivitamins (Multivitamin) 1 Each Tablet 1 Tab PO DAILY


Trazodone Hcl 50 Mg Tablet 25 Mg PO BID PRN


Ayr Saline (Sodium Chloride) 50 Ml Drops 2 Drop NS QID


Miralax (Polyethylene Glycol 3350) 17 Gm Powd.pack 17 Gm PO PRN BID PRN


Protonix (Pantoprazole Sodium) 40 Mg Tablet. 40 Mg PO DAILY


Zyprexa (Olanzapine) 5 Mg Tablet 5 Mg PO PRN DAILY PRN


Lisinopril 10 Mg Tablet 10 Mg PO DAILY


[lidoderm]   1 Patch TD DAILY


Levothyroxine Sodium 75 Mcg Tablet 75 Mcg PO DAILYAC


Ipratropium Bromide 0.2 Mg/1 Ml Solution 0.2 Mg IH PRN Q6HRS PRN


Hydrocodone-Apap 5-325  ** (Hydrocodone Bit/Acetaminophen) 1 Each Tablet 1 Tab 

PO PRN Q12HR PRN


Hydralazine Hcl 10 Mg Tablet 10 Mg PO TID


Lovenox (Enoxaparin Sodium) 40 Mg/0.4 Ml Disp.syrin 40 Mg SQ DAILY


Donepezil Hcl 10 Mg Tablet 10 Mg PO HS


Colace (Docusate Sodium) 100 Mg Capsule 100 Mg PO PRN DAILY PRN


Depakote Er (Divalproex Sodium) 500 Mg Tab.er.24h 750 Mg PO HS


Coreg  ** (Carvedilol) 6.25 Mg Tablet 6.25 Mg PO BIDWMEALS


Symbicort 160-4.5 Mcg Inhaler (Budesonide/Formoterol Fumarate) 10.2 Gm 

Hfa.aer.ad 2 Puff IH BID


Lipitor (Atorvastatin Calcium) 20 Mg Tablet 20 Mg PO QHS


Aspirin Ec (Aspirin) 325 Mg Tablet. 325 Mg PO DAILY


Amlodipine Besylate 10 Mg Tablet 10 Mg PO DAILY


Albuterol Sulfate Conc Neb Soln (Albuterol Sulfate) 2.5 Mg/0.5 Ml Vial.neb 2.5 

Mg NEB PRN Q6HRS PRN


Acetaminophen 650 Mg/20.3 Ml Solution 650 Mg PO PRN Q6HRS PRN


I have reviewed the current psychotropics carefully including drug 

interactions.  Risk benefit ratio favors no change other than as noted in my 

dictated progress note.





Diagnosis:


Problems:  


(1) Anxiety disorder


(2) Major depressive disorder, recurrent episode


(3) Psychosis, atypical


(4) Impulse control disorder











JULY ZAMUDIO MD Jan 5, 2019 21:04

## 2019-01-06 VITALS — SYSTOLIC BLOOD PRESSURE: 150 MMHG | DIASTOLIC BLOOD PRESSURE: 81 MMHG

## 2019-01-06 VITALS — SYSTOLIC BLOOD PRESSURE: 171 MMHG | DIASTOLIC BLOOD PRESSURE: 81 MMHG

## 2019-01-06 LAB
APTT PPP: YELLOW S
BACTERIA #/AREA URNS HPF: (no result) /HPF
BILIRUB UR QL STRIP: (no result)
FIBRINOGEN PPP-MCNC: (no result) MG/DL
GLUCOSE UR STRIP-MCNC: (no result) MG/DL
NITRITE UR QL STRIP: (no result)
RBC #/AREA URNS HPF: (no result) /HPF (ref 0–2)
SP GR UR STRIP: 1.01
SQUAMOUS #/AREA URNS LPF: (no result) /LPF
UROBILINOGEN UR-MCNC: 0.2 MG/DL
WBC #/AREA URNS HPF: >40 /HPF (ref 0–4)

## 2019-01-06 RX ADMIN — MULTIPLE VITAMINS W/ MINERALS TAB SCH TAB: TAB at 08:01

## 2019-01-06 RX ADMIN — Medication SCH EA: at 21:00

## 2019-01-06 RX ADMIN — HYDROCODONE BITARTRATE AND ACETAMINOPHEN PRN TAB: 5; 325 TABLET ORAL at 21:13

## 2019-01-06 RX ADMIN — BUDESONIDE SCH MG: 0.5 SUSPENSION RESPIRATORY (INHALATION) at 19:44

## 2019-01-06 RX ADMIN — DIVALPROEX SODIUM SCH MG: 125 CAPSULE, COATED PELLETS ORAL at 08:01

## 2019-01-06 RX ADMIN — LEVOTHYROXINE SODIUM SCH MCG: 75 TABLET ORAL at 05:05

## 2019-01-06 RX ADMIN — ENOXAPARIN SODIUM SCH MG: 100 INJECTION SUBCUTANEOUS at 08:01

## 2019-01-06 RX ADMIN — PANTOPRAZOLE SODIUM SCH MG: 40 TABLET, DELAYED RELEASE ORAL at 08:02

## 2019-01-06 RX ADMIN — SERTRALINE SCH MG: 25 TABLET, FILM COATED ORAL at 08:02

## 2019-01-06 RX ADMIN — I-VITE, TAB 1000-60-2MG (60/BT) SCH TAB: TAB at 08:02

## 2019-01-06 RX ADMIN — LIDOCAINE SCH PATCH: 50 PATCH CUTANEOUS at 08:01

## 2019-01-06 RX ADMIN — ASPIRIN SCH MG: 325 TABLET, DELAYED RELEASE ORAL at 08:01

## 2019-01-06 RX ADMIN — ALBUTEROL SULFATE SCH MG: 108 AEROSOL, METERED RESPIRATORY (INHALATION) at 10:33

## 2019-01-06 RX ADMIN — CARVEDILOL SCH MG: 6.25 TABLET, FILM COATED ORAL at 08:02

## 2019-01-06 RX ADMIN — QUETIAPINE FUMARATE SCH MG: 25 TABLET, FILM COATED ORAL at 11:55

## 2019-01-06 RX ADMIN — DIVALPROEX SODIUM SCH MG: 125 CAPSULE, COATED PELLETS ORAL at 21:06

## 2019-01-06 RX ADMIN — DONEPEZIL HYDROCHLORIDE SCH MG: 10 TABLET ORAL at 21:06

## 2019-01-06 RX ADMIN — LISINOPRIL SCH MG: 10 TABLET ORAL at 08:03

## 2019-01-06 RX ADMIN — ALBUTEROL SULFATE SCH MG: 108 AEROSOL, METERED RESPIRATORY (INHALATION) at 16:12

## 2019-01-06 RX ADMIN — ALBUTEROL SULFATE SCH MG: 108 AEROSOL, METERED RESPIRATORY (INHALATION) at 04:58

## 2019-01-06 RX ADMIN — ATORVASTATIN CALCIUM SCH MG: 20 TABLET, FILM COATED ORAL at 21:06

## 2019-01-06 RX ADMIN — CARVEDILOL SCH MG: 6.25 TABLET, FILM COATED ORAL at 16:59

## 2019-01-06 RX ADMIN — BUDESONIDE SCH MG: 0.5 SUSPENSION RESPIRATORY (INHALATION) at 10:33

## 2019-01-06 RX ADMIN — ALBUTEROL SULFATE SCH MG: 108 AEROSOL, METERED RESPIRATORY (INHALATION) at 19:44

## 2019-01-06 NOTE — PN
DATE:  01/04/2019



PSYCHIATRIC PROGRESS NOTE



This late entry 01/04/2019 covers elements not covered in my initial note.



SUBJECTIVE:  I met with the patient in the evening.  The patient slept 7 hours

previous night.  When I met with her, she said she was not feeling well, has had

some chronic pain symptoms.  Received p.r.n. medications for this somewhat

withdrawn.



REVIEW OF SYSTEMS:  Shortness of breath on O2 supplements, impaired ambulation

in a wheelchair.  No CV, , GI, eye system symptoms on review.



MENTAL STATUS EXAM:  Oriented to herself and situation.  Speech has some

latency.  Abstraction fair, computation impaired, language function intact,

attention span short.  Mood and affect somewhat withdrawn.  No suicidal

ideation.



LABORATORY DATA:  Reviewed.



IMPRESSION:  Major depressive disorder, rule out bipolar 1 disorder, mixed;

anxiety disorder, unspecified; major neurocognitive disorder, early Alzheimer,

vascular with depression.  Rest unchanged.



PLAN:  No change from initial note.  Valproic acid level is therapeutic at 54.





______________________________

MAN ARLENE ZAMUDIO MD



DR:  GINNA/christiano  JOB#:  [id number] / [id number]

DD:  01/06/2019 09:47  DT:  01/06/2019 10:21

## 2019-01-06 NOTE — PDOC
Exam


Note:


Marquise Note:


Please also refer to the separate dictated note~for this date of service 

dictated separately.~Patient seen individually. Discussed the patient with 

Nursing staff reviewed the chart.~Reviewed interim history and current 

functioning. Reviewed vital signs,~Labs/ Radiology~and current medications 

noted below. Continue current treatment with the changes noted in the dictated 

addendum note





Assessment:


Vital Signs:





 Vital Signs








  Date Time  Temp Pulse Resp B/P (MAP) Pulse Ox O2 Delivery O2 Flow Rate FiO2


 


1/6/19 19:45     93 Nasal Cannula 2.0 


 


1/6/19 16:59  66  150/81    


 


1/6/19 16:08 97.3  18     








I&O











Intake and Output 


 


 1/6/19





 07:01


 


Intake Total 1080 ml


 


Balance 1080 ml


 


 


 


Intake Oral 1080 ml


 


# Bowel Movements 1








Labs:





 Laboratory Tests








Test


 1/6/19


02:13


 


Urine Collection Type Unknown  


 


Urine Color Yellow  


 


Urine Clarity Hazy  


 


Urine pH 7.0  


 


Urine Specific Gravity 1.015  


 


Urine Protein


 Trace


(NEG-TRACE)


 


Urine Glucose (UA)


 Neg mg/dL


(NEG)


 


Urine Ketones (Stick)


 Neg mg/dL


(NEG)


 


Urine Blood Trace (NEG)  


 


Urine Nitrite Pos (NEG)  


 


Urine Bilirubin Neg (NEG)  


 


Urine Urobilinogen Dipstick


 0.2 mg/dL (0.2


mg/dL)


 


Urine Leukocyte Esterase Small (NEG)  


 


Urine RBC


 1-2 /HPF (0-2)





 


Urine WBC


 >40 /HPF (0-4)





 


Urine Squamous Epithelial


Cells Mod /LPF  





 


Urine Bacteria


 Many /HPF


(0-FEW)











Current Medications:


Meds:





Current Medications


Multi-Ingredient Ointment (Analgesic Balm) 1 louis PRN QID  PRN TP MUSCLE PAIN;  

Start 12/21/18 at 18:45


Al Hydroxide/Mg Hydroxide (Mylanta Plus Xs) 15 ml PRN AFTMEALHC  PRN PO 

DYSPEPSIA;  Start 12/21/18 at 18:45


Magnesium Hydroxide (Milk Of Magnesia) 2,400 mg PRN QHS  PRN PO CONSTIPATION;  

Start 12/21/18 at 18:45


Aspirin (Aspirin Enteric Coated) 325 mg DAILY PO  Last administered on 1/6/19at 

08:01;  Start 12/22/18 at 09:00


Atorvastatin Calcium (Lipitor) 20 mg QHS PO  Last administered on 1/6/19at 21:06

;  Start 12/21/18 at 21:00


Estrogens Conjugated (Premarin) 0.5 louis PRN DAILY  PRN VG vaginal dryness;  

Start 12/21/18 at 19:15


Acetaminophen/ Hydrocodone Bitart (Lortab 5/325) 1 tab PRN Q12HR  PRN PO PAIN 

Last administered on 12/24/18at 09:06;  Start 12/21/18 at 19:15;  Stop 12/24/18 

at 15:02;  Status DC


Ipratropium Bromide (Atrovent) 0.2 mg PRN Q6HRS  PRN IH SHORTNESS OF BREATH;  

Start 12/21/18 at 19:15


Levothyroxine Sodium (Synthroid) 75 mcg DAILYAC PO  Last administered on 12/22/ 18at 08:40;  Start 12/22/18 at 07:30;  Stop 12/22/18 at 12:26;  Status DC


Lisinopril (Prinivil) 10 mg DAILY PO  Last administered on 1/6/19at 08:03;  

Start 12/22/18 at 09:00


Acetaminophen (Tylenol) 650 mg PRN Q6HRS  PRN PO PAIN / TEMP Last administered 

on 1/4/19at 06:01;  Start 12/21/18 at 19:45


Non-Formulary Medication (Albuterol Sulfate (Albuterol Sulfate Conc Neb Soln)) 

2.5 mg PRN Q6HRS  PRN NEB SHORTNESS OF BREATH;  Start 12/21/18 at 19:15;  

Status UNV


Amlodipine Besylate (Norvasc) 10 mg DAILY PO  Last administered on 12/25/18at 08

:38;  Start 12/22/18 at 09:00;  Stop 12/25/18 at 12:27;  Status DC


Non-Formulary Medication (Budesonide/ Formoterol Fumarate (Symbicort 160-4.5 

Mcg Inhaler)) 2 puff BID IH ;  Start 12/21/18 at 21:00;  Status UNV


Carvedilol (Coreg) 6.25 mg BIDWMEALS PO  Last administered on 1/6/19at 16:59;  

Start 12/21/18 at 20:00


Divalproex Sodium (Depakote Er) 750 mg QHS PO  Last administered on 12/21/18at 

20:53;  Start 12/21/18 at 21:00;  Stop 12/22/18 at 21:13;  Status DC


Docusate Sodium (Colace) 100 mg PRN DAILY  PRN PO HARD STOOLS;  Start 12/21/18 

at 19:45


Donepezil HCl (Aricept) 10 mg QHS PO  Last administered on 1/6/19at 21:06;  

Start 12/21/18 at 21:00


Enoxaparin Sodium (Lovenox 40mg Syringe) 40 mg DAILY SQ  Last administered on 1/ 6/19at 08:01;  Start 12/22/18 at 09:00


Hydralazine HCl (Apresoline) 10 mg TID PO  Last administered on 12/25/18at 08:39

;  Start 12/21/18 at 21:00;  Stop 12/25/18 at 12:27;  Status DC


Multivitamins/ Calcium (Thera-M Plus) 1 tab DAILY PO  Last administered on 1/6/ 19at 08:01;  Start 12/22/18 at 09:00


Olanzapine (ZyPREXA) 5 mg PRN DAILY  PRN PO ANXIETY/AGITATION Last administered 

on 12/23/18at 10:50;  Start 12/22/18 at 09:00


Pantoprazole Sodium (Protonix) 40 mg DAILYAC PO  Last administered on 1/6/19at 

08:02;  Start 12/22/18 at 07:30


Phenazopyridine HCl (Pyridium) 100 mg PRN TID  PRN PO URINARY PAIN;  Start 12/21 /18 at 19:45


Polyethylene Glycol (miraLAX) 17 gm PRN BID  PRN PO CONSTIPATION;  Start 12/22/ 18 at 09:00


Sodium Chloride (Saline Mist Nasal) 1 louis QID NS ;  Start 12/21/18 at 21:00;  

Status Cancel


Trazodone HCl (Desyrel) 25 mg PRN BID  PRN PO ANXIETY/AGITATION;  Start 12/21/ 18 at 19:45


Trazodone HCl (Desyrel) 50 mg PRN QHS  PRN PO INSOMNIA;  Start 12/21/18 at 19:45


Lidocaine (Lidoderm) 1 patch DAILY TD  Last administered on 1/6/19at 08:01;  

Start 12/22/18 at 09:00


Multivitamins/ Minerals (I-Irina) 1 tab DAILY PO  Last administered on 1/6/19at 

08:02;  Start 12/22/18 at 09:00


Miscellaneous (Lidoderm Patch Removal) 1 ea QHS MC  Last administered on 1/6/ 19at 21:00;  Start 12/21/18 at 21:00


Albuterol Sulfate (Ventolin) 2.5 mg PRN Q6HRS  PRN NEB SHORTNESS OF BREATH;  

Start 12/21/18 at 19:45


Albuterol Sulfate (Ventolin) 2.5 mg RTQID NEB  Last administered on 1/6/19at 19:

44;  Start 12/21/18 at 20:00


Budesonide (Pulmicort) 0.5 mg RTBID NEB  Last administered on 1/6/19at 19:44;  

Start 12/21/18 at 20:00


Sodium Chloride (Ayr Saline Nasal) 2 louis QID NS ;  Start 12/21/18 at 21:00;  

Stop 12/22/18 at 13:08;  Status DC


Levothyroxine Sodium (Synthroid) 75 mcg DAILY06 PO  Last administered on 1/6/ 19at 05:05;  Start 12/23/18 at 06:00


Sodium Chloride (Saline Mist Nasal) 1 louis PRN QID  PRN NS NASAL CONGESTION;  

Start 12/22/18 at 13:15


Divalproex Sodium (Depakote Sprinkles) 750 mg HS PO  Last administered on 12/23/ 18at 20:20;  Start 12/22/18 at 21:30;  Stop 12/24/18 at 14:27;  Status DC


Sertraline HCl (Zoloft) 25 mg DAILY PO  Last administered on 12/25/18at 08:39;  

Start 12/23/18 at 09:00;  Stop 12/25/18 at 09:01;  Status DC


Sertraline HCl (Zoloft) 50 mg DAILY PO  Last administered on 1/6/19at 08:02;  

Start 12/26/18 at 09:00


Divalproex Sodium (Depakote Sprinkles) 500 mg BID94 PO  Last administered on 12/ 24/18at 07:55;  Start 12/24/18 at 09:00;  Stop 12/24/18 at 14:27;  Status DC


Quetiapine Fumarate (SEROquel) 12.5 mg 1300 PO  Last administered on 1/6/19at 11

:55;  Start 12/24/18 at 13:00


Divalproex Sodium (Depakote Sprinkles) 500 mg BID PO  Last administered on 12/26 /18at 09:59;  Start 12/24/18 at 21:00;  Stop 12/26/18 at 17:43;  Status DC


Acetaminophen/ Hydrocodone Bitart (Lortab 5/325) 1 tab PRN Q6HRS  PRN PO PAIN 

Last administered on 1/6/19at 21:13;  Start 12/24/18 at 15:15


Olanzapine (ZyPREXA ZYDIS) 2.5 mg PRN Q2HR  PRN PO PSYCHOSIS;  Start 12/24/18 

at 19:15


Divalproex Sodium (Depakote Sprinkles) 625 mg BID PO  Last administered on 1/6/ 19at 21:06;  Start 12/26/18 at 21:00


Ondansetron HCl (Zofran Odt) 4 mg PRN Q8HRS  PRN PO NAUSEA/VOMITING Last 

administered on 1/3/19at 08:30;  Start 1/3/19 at 08:30





Active Scripts


Active


Reported


[occuvite]   1 Tab PO DAILY


Pyridium (Phenazopyridine Hcl) 100 Mg Tablet 100 Mg PO PRN TID PRN


Trazodone Hcl 50 Mg Tablet 25 Mg PO PRN QHS PRN


Premarin (Estrogens, Conjugated) 30 Gm Cream.appl 0.5 Gm VG PRN DAILY PRN


Multivitamins (Multivitamin) 1 Each Tablet 1 Tab PO DAILY


Trazodone Hcl 50 Mg Tablet 25 Mg PO BID PRN


Ayr Saline (Sodium Chloride) 50 Ml Drops 2 Drop NS QID


Miralax (Polyethylene Glycol 3350) 17 Gm Powd.pack 17 Gm PO PRN BID PRN


Protonix (Pantoprazole Sodium) 40 Mg Tablet.dr 40 Mg PO DAILY


Zyprexa (Olanzapine) 5 Mg Tablet 5 Mg PO PRN DAILY PRN


Lisinopril 10 Mg Tablet 10 Mg PO DAILY


[lidoderm]   1 Patch TD DAILY


Levothyroxine Sodium 75 Mcg Tablet 75 Mcg PO DAILYAC


Ipratropium Bromide 0.2 Mg/1 Ml Solution 0.2 Mg IH PRN Q6HRS PRN


Hydrocodone-Apap 5-325  ** (Hydrocodone Bit/Acetaminophen) 1 Each Tablet 1 Tab 

PO PRN Q12HR PRN


Hydralazine Hcl 10 Mg Tablet 10 Mg PO TID


Lovenox (Enoxaparin Sodium) 40 Mg/0.4 Ml Disp.syrin 40 Mg SQ DAILY


Donepezil Hcl 10 Mg Tablet 10 Mg PO HS


Colace (Docusate Sodium) 100 Mg Capsule 100 Mg PO PRN DAILY PRN


Depakote Er (Divalproex Sodium) 500 Mg Tab.er.24h 750 Mg PO HS


Coreg  ** (Carvedilol) 6.25 Mg Tablet 6.25 Mg PO BIDWMEALS


Symbicort 160-4.5 Mcg Inhaler (Budesonide/Formoterol Fumarate) 10.2 Gm 

Hfa.aer.ad 2 Puff IH BID


Lipitor (Atorvastatin Calcium) 20 Mg Tablet 20 Mg PO QHS


Aspirin Ec (Aspirin) 325 Mg Tablet.dr 325 Mg PO DAILY


Amlodipine Besylate 10 Mg Tablet 10 Mg PO DAILY


Albuterol Sulfate Conc Neb Soln (Albuterol Sulfate) 2.5 Mg/0.5 Ml Vial.neb 2.5 

Mg NEB PRN Q6HRS PRN


Acetaminophen 650 Mg/20.3 Ml Solution 650 Mg PO PRN Q6HRS PRN


I have reviewed the current psychotropics carefully including drug 

interactions.  Risk benefit ratio favors no change other than as noted in my 

dictated progress note.





Diagnosis:


Problems:  


(1) Anxiety disorder


(2) Major depressive disorder, recurrent episode


(3) Psychosis, atypical


(4) Impulse control disorder











JULY ZAMUDIO MD Jan 6, 2019 22:44

## 2019-01-07 VITALS — DIASTOLIC BLOOD PRESSURE: 76 MMHG | SYSTOLIC BLOOD PRESSURE: 136 MMHG

## 2019-01-07 VITALS — DIASTOLIC BLOOD PRESSURE: 78 MMHG | SYSTOLIC BLOOD PRESSURE: 131 MMHG

## 2019-01-07 RX ADMIN — I-VITE, TAB 1000-60-2MG (60/BT) SCH TAB: TAB at 07:32

## 2019-01-07 RX ADMIN — LEVOTHYROXINE SODIUM SCH MCG: 75 TABLET ORAL at 05:45

## 2019-01-07 RX ADMIN — ATORVASTATIN CALCIUM SCH MG: 20 TABLET, FILM COATED ORAL at 21:11

## 2019-01-07 RX ADMIN — ALBUTEROL SULFATE SCH MG: 108 AEROSOL, METERED RESPIRATORY (INHALATION) at 09:49

## 2019-01-07 RX ADMIN — PANTOPRAZOLE SODIUM SCH MG: 40 TABLET, DELAYED RELEASE ORAL at 07:31

## 2019-01-07 RX ADMIN — CARVEDILOL SCH MG: 6.25 TABLET, FILM COATED ORAL at 16:43

## 2019-01-07 RX ADMIN — ALBUTEROL SULFATE SCH MG: 108 AEROSOL, METERED RESPIRATORY (INHALATION) at 15:21

## 2019-01-07 RX ADMIN — CARVEDILOL SCH MG: 6.25 TABLET, FILM COATED ORAL at 07:32

## 2019-01-07 RX ADMIN — DONEPEZIL HYDROCHLORIDE SCH MG: 10 TABLET ORAL at 21:11

## 2019-01-07 RX ADMIN — ALBUTEROL SULFATE SCH MG: 108 AEROSOL, METERED RESPIRATORY (INHALATION) at 20:11

## 2019-01-07 RX ADMIN — LISINOPRIL SCH MG: 10 TABLET ORAL at 07:32

## 2019-01-07 RX ADMIN — HYDROCODONE BITARTRATE AND ACETAMINOPHEN PRN TAB: 5; 325 TABLET ORAL at 18:22

## 2019-01-07 RX ADMIN — DIVALPROEX SODIUM SCH MG: 125 CAPSULE, COATED PELLETS ORAL at 07:31

## 2019-01-07 RX ADMIN — DIVALPROEX SODIUM SCH MG: 125 CAPSULE, COATED PELLETS ORAL at 21:11

## 2019-01-07 RX ADMIN — LIDOCAINE SCH PATCH: 50 PATCH CUTANEOUS at 07:33

## 2019-01-07 RX ADMIN — MULTIPLE VITAMINS W/ MINERALS TAB SCH TAB: TAB at 07:33

## 2019-01-07 RX ADMIN — ENOXAPARIN SODIUM SCH MG: 100 INJECTION SUBCUTANEOUS at 07:34

## 2019-01-07 RX ADMIN — SERTRALINE SCH MG: 25 TABLET, FILM COATED ORAL at 07:32

## 2019-01-07 RX ADMIN — BUDESONIDE SCH MG: 0.5 SUSPENSION RESPIRATORY (INHALATION) at 20:11

## 2019-01-07 RX ADMIN — ASPIRIN SCH MG: 325 TABLET, DELAYED RELEASE ORAL at 07:31

## 2019-01-07 RX ADMIN — Medication SCH EA: at 21:00

## 2019-01-07 RX ADMIN — ALBUTEROL SULFATE SCH MG: 108 AEROSOL, METERED RESPIRATORY (INHALATION) at 05:05

## 2019-01-07 RX ADMIN — QUETIAPINE FUMARATE SCH MG: 25 TABLET, FILM COATED ORAL at 11:35

## 2019-01-07 RX ADMIN — BUDESONIDE SCH MG: 0.5 SUSPENSION RESPIRATORY (INHALATION) at 09:49

## 2019-01-07 NOTE — PDOC
Exam


Note:


Marquise Note:


Please also refer to the separate dictated note~for this date of service 

dictated separately.~Patient seen individually. Discussed the patient with 

Nursing staff reviewed the chart.~Reviewed interim history and current 

functioning. Reviewed vital signs,~Labs/ Radiology~and current medications 

noted below. Continue current treatment with the changes noted in the dictated 

addendum note





Assessment:


Vital Signs:





 Vital Signs








  Date Time  Temp Pulse Resp B/P (MAP) Pulse Ox O2 Delivery O2 Flow Rate FiO2


 


1/7/19 20:17     95 Nasal Cannula 2.0 


 


1/7/19 18:22   18     


 


1/7/19 16:43  67  136/76    


 


1/7/19 16:07 98.2       








I&O











Intake and Output 


 


 1/7/19





 07:01


 


Intake Total 920 ml


 


Balance 920 ml


 


 


 


Intake Oral 920 ml











Current Medications:


Meds:





Current Medications


Multi-Ingredient Ointment (Analgesic Balm) 1 louis PRN QID  PRN TP MUSCLE PAIN;  

Start 12/21/18 at 18:45


Al Hydroxide/Mg Hydroxide (Mylanta Plus Xs) 15 ml PRN AFTMEALHC  PRN PO 

DYSPEPSIA;  Start 12/21/18 at 18:45


Magnesium Hydroxide (Milk Of Magnesia) 2,400 mg PRN QHS  PRN PO CONSTIPATION;  

Start 12/21/18 at 18:45


Aspirin (Aspirin Enteric Coated) 325 mg DAILY PO  Last administered on 1/7/19at 

07:31;  Start 12/22/18 at 09:00


Atorvastatin Calcium (Lipitor) 20 mg QHS PO  Last administered on 1/7/19at 21:11

;  Start 12/21/18 at 21:00


Estrogens Conjugated (Premarin) 0.5 louis PRN DAILY  PRN VG vaginal dryness;  

Start 12/21/18 at 19:15


Acetaminophen/ Hydrocodone Bitart (Lortab 5/325) 1 tab PRN Q12HR  PRN PO PAIN 

Last administered on 12/24/18at 09:06;  Start 12/21/18 at 19:15;  Stop 12/24/18 

at 15:02;  Status DC


Ipratropium Bromide (Atrovent) 0.2 mg PRN Q6HRS  PRN IH SHORTNESS OF BREATH;  

Start 12/21/18 at 19:15


Levothyroxine Sodium (Synthroid) 75 mcg DAILYAC PO  Last administered on 12/22/ 18at 08:40;  Start 12/22/18 at 07:30;  Stop 12/22/18 at 12:26;  Status DC


Lisinopril (Prinivil) 10 mg DAILY PO  Last administered on 1/7/19at 07:32;  

Start 12/22/18 at 09:00


Acetaminophen (Tylenol) 650 mg PRN Q6HRS  PRN PO PAIN / TEMP Last administered 

on 1/4/19at 06:01;  Start 12/21/18 at 19:45


Non-Formulary Medication (Albuterol Sulfate (Albuterol Sulfate Conc Neb Soln)) 

2.5 mg PRN Q6HRS  PRN NEB SHORTNESS OF BREATH;  Start 12/21/18 at 19:15;  

Status UNV


Amlodipine Besylate (Norvasc) 10 mg DAILY PO  Last administered on 12/25/18at 08

:38;  Start 12/22/18 at 09:00;  Stop 12/25/18 at 12:27;  Status DC


Non-Formulary Medication (Budesonide/ Formoterol Fumarate (Symbicort 160-4.5 

Mcg Inhaler)) 2 puff BID IH ;  Start 12/21/18 at 21:00;  Status UNV


Carvedilol (Coreg) 6.25 mg BIDWMEALS PO  Last administered on 1/7/19at 16:43;  

Start 12/21/18 at 20:00


Divalproex Sodium (Depakote Er) 750 mg QHS PO  Last administered on 12/21/18at 

20:53;  Start 12/21/18 at 21:00;  Stop 12/22/18 at 21:13;  Status DC


Docusate Sodium (Colace) 100 mg PRN DAILY  PRN PO HARD STOOLS;  Start 12/21/18 

at 19:45


Donepezil HCl (Aricept) 10 mg QHS PO  Last administered on 1/7/19at 21:11;  

Start 12/21/18 at 21:00


Enoxaparin Sodium (Lovenox 40mg Syringe) 40 mg DAILY SQ  Last administered on 1/ 7/19at 07:34;  Start 12/22/18 at 09:00


Hydralazine HCl (Apresoline) 10 mg TID PO  Last administered on 12/25/18at 08:39

;  Start 12/21/18 at 21:00;  Stop 12/25/18 at 12:27;  Status DC


Multivitamins/ Calcium (Thera-M Plus) 1 tab DAILY PO  Last administered on 1/7/ 19at 07:33;  Start 12/22/18 at 09:00


Olanzapine (ZyPREXA) 5 mg PRN DAILY  PRN PO ANXIETY/AGITATION Last administered 

on 12/23/18at 10:50;  Start 12/22/18 at 09:00


Pantoprazole Sodium (Protonix) 40 mg DAILYAC PO  Last administered on 1/7/19at 

07:31;  Start 12/22/18 at 07:30


Phenazopyridine HCl (Pyridium) 100 mg PRN TID  PRN PO URINARY PAIN;  Start 12/21 /18 at 19:45


Polyethylene Glycol (miraLAX) 17 gm PRN BID  PRN PO CONSTIPATION;  Start 12/22/ 18 at 09:00


Sodium Chloride (Saline Mist Nasal) 1 louis QID NS ;  Start 12/21/18 at 21:00;  

Status Cancel


Trazodone HCl (Desyrel) 25 mg PRN BID  PRN PO ANXIETY/AGITATION;  Start 12/21/ 18 at 19:45


Trazodone HCl (Desyrel) 50 mg PRN QHS  PRN PO INSOMNIA;  Start 12/21/18 at 19:45


Lidocaine (Lidoderm) 1 patch DAILY TD  Last administered on 1/7/19at 07:33;  

Start 12/22/18 at 09:00


Multivitamins/ Minerals (I-Irina) 1 tab DAILY PO  Last administered on 1/7/19at 

07:32;  Start 12/22/18 at 09:00


Miscellaneous (Lidoderm Patch Removal) 1 ea QHS MC  Last administered on 1/7/ 19at 21:00;  Start 12/21/18 at 21:00


Albuterol Sulfate (Ventolin) 2.5 mg PRN Q6HRS  PRN NEB SHORTNESS OF BREATH;  

Start 12/21/18 at 19:45


Albuterol Sulfate (Ventolin) 2.5 mg RTQID NEB  Last administered on 1/7/19at 20:

11;  Start 12/21/18 at 20:00


Budesonide (Pulmicort) 0.5 mg RTBID NEB  Last administered on 1/7/19at 20:11;  

Start 12/21/18 at 20:00


Sodium Chloride (Ayr Saline Nasal) 2 louis QID NS ;  Start 12/21/18 at 21:00;  

Stop 12/22/18 at 13:08;  Status DC


Levothyroxine Sodium (Synthroid) 75 mcg DAILY06 PO  Last administered on 1/7/ 19at 05:45;  Start 12/23/18 at 06:00


Sodium Chloride (Saline Mist Nasal) 1 louis PRN QID  PRN NS NASAL CONGESTION;  

Start 12/22/18 at 13:15


Divalproex Sodium (Depakote Sprinkles) 750 mg HS PO  Last administered on 12/23/ 18at 20:20;  Start 12/22/18 at 21:30;  Stop 12/24/18 at 14:27;  Status DC


Sertraline HCl (Zoloft) 25 mg DAILY PO  Last administered on 12/25/18at 08:39;  

Start 12/23/18 at 09:00;  Stop 12/25/18 at 09:01;  Status DC


Sertraline HCl (Zoloft) 50 mg DAILY PO  Last administered on 1/7/19at 07:32;  

Start 12/26/18 at 09:00


Divalproex Sodium (Depakote Sprinkles) 500 mg BID94 PO  Last administered on 12/ 24/18at 07:55;  Start 12/24/18 at 09:00;  Stop 12/24/18 at 14:27;  Status DC


Quetiapine Fumarate (SEROquel) 12.5 mg 1300 PO  Last administered on 1/7/19at 11

:35;  Start 12/24/18 at 13:00


Divalproex Sodium (Depakote Sprinkles) 500 mg BID PO  Last administered on 12/26 /18at 09:59;  Start 12/24/18 at 21:00;  Stop 12/26/18 at 17:43;  Status DC


Acetaminophen/ Hydrocodone Bitart (Lortab 5/325) 1 tab PRN Q6HRS  PRN PO PAIN 

Last administered on 1/7/19at 18:22;  Start 12/24/18 at 15:15


Olanzapine (ZyPREXA ZYDIS) 2.5 mg PRN Q2HR  PRN PO PSYCHOSIS;  Start 12/24/18 

at 19:15


Divalproex Sodium (Depakote Sprinkles) 625 mg BID PO  Last administered on 1/7/ 19at 21:11;  Start 12/26/18 at 21:00


Ondansetron HCl (Zofran Odt) 4 mg PRN Q8HRS  PRN PO NAUSEA/VOMITING Last 

administered on 1/3/19at 08:30;  Start 1/3/19 at 08:30





Active Scripts


Active


Reported


[occuvite]   1 Tab PO DAILY


Pyridium (Phenazopyridine Hcl) 100 Mg Tablet 100 Mg PO PRN TID PRN


Trazodone Hcl 50 Mg Tablet 25 Mg PO PRN QHS PRN


Premarin (Estrogens, Conjugated) 30 Gm Cream.appl 0.5 Gm VG PRN DAILY PRN


Multivitamins (Multivitamin) 1 Each Tablet 1 Tab PO DAILY


Trazodone Hcl 50 Mg Tablet 25 Mg PO BID PRN


Ayr Saline (Sodium Chloride) 50 Ml Drops 2 Drop NS QID


Miralax (Polyethylene Glycol 3350) 17 Gm Powd.pack 17 Gm PO PRN BID PRN


Protonix (Pantoprazole Sodium) 40 Mg Tablet. 40 Mg PO DAILY


Zyprexa (Olanzapine) 5 Mg Tablet 5 Mg PO PRN DAILY PRN


Lisinopril 10 Mg Tablet 10 Mg PO DAILY


[lidoderm]   1 Patch TD DAILY


Levothyroxine Sodium 75 Mcg Tablet 75 Mcg PO DAILYAC


Ipratropium Bromide 0.2 Mg/1 Ml Solution 0.2 Mg IH PRN Q6HRS PRN


Hydrocodone-Apap 5-325  ** (Hydrocodone Bit/Acetaminophen) 1 Each Tablet 1 Tab 

PO PRN Q12HR PRN


Hydralazine Hcl 10 Mg Tablet 10 Mg PO TID


Lovenox (Enoxaparin Sodium) 40 Mg/0.4 Ml Disp.syrin 40 Mg SQ DAILY


Donepezil Hcl 10 Mg Tablet 10 Mg PO HS


Colace (Docusate Sodium) 100 Mg Capsule 100 Mg PO PRN DAILY PRN


Depakote Er (Divalproex Sodium) 500 Mg Tab.er.24h 750 Mg PO HS


Coreg  ** (Carvedilol) 6.25 Mg Tablet 6.25 Mg PO BIDWMEALS


Symbicort 160-4.5 Mcg Inhaler (Budesonide/Formoterol Fumarate) 10.2 Gm 

Hfa.aer.ad 2 Puff IH BID


Lipitor (Atorvastatin Calcium) 20 Mg Tablet 20 Mg PO QHS


Aspirin Ec (Aspirin) 325 Mg Tablet. 325 Mg PO DAILY


Amlodipine Besylate 10 Mg Tablet 10 Mg PO DAILY


Albuterol Sulfate Conc Neb Soln (Albuterol Sulfate) 2.5 Mg/0.5 Ml Vial.neb 2.5 

Mg NEB PRN Q6HRS PRN


Acetaminophen 650 Mg/20.3 Ml Solution 650 Mg PO PRN Q6HRS PRN


I have reviewed the current psychotropics carefully including drug 

interactions.  Risk benefit ratio favors no change other than as noted in my 

dictated progress note.





Diagnosis:


Problems:  


(1) Anxiety disorder


(2) Major depressive disorder, recurrent episode


(3) Psychosis, atypical


(4) Impulse control disorder











JULY ZAMUDIO MD Jan 7, 2019 22:38

## 2019-01-07 NOTE — PN
DATE:  01/06/2019



PSYCHIATRIC PROGRESS NOTE



This late entry 01/06/2019 covers elements, not covered in my initial note.



SUBJECTIVE:  I met with the patient in the evening.  The patient slept 5-1/2

hours previous night.  She remains somewhat withdrawn, refused lunch, did eat

breakfast and dinner.



REVIEW OF SYSTEMS:  Ambulation impaired, in wheelchair, shortness of breath, on

O2 supplements.  No CV, , GI, eye system symptoms on review.



MENTAL STATUS EXAM:  Oriented to herself and situation.  Speech has some

latency, coherent.  Abstraction fair, computation impaired, language function

intact, attention span short.  Mood and affect, somewhat anxious, at times

labile, but improved.



LABORATORY DATA:  Reviewed.



IMPRESSION:  Unchanged from initial note.



PLAN:  No change from initial note.





______________________________

MAN ARLENE ZAMUDIO MD



DR:  GINNA/christiano  JOB#:  [id number] / [id number]

DD:  01/07/2019 17:38  DT:  01/07/2019 23:48

## 2019-01-08 VITALS — SYSTOLIC BLOOD PRESSURE: 153 MMHG | DIASTOLIC BLOOD PRESSURE: 85 MMHG

## 2019-01-08 VITALS — DIASTOLIC BLOOD PRESSURE: 83 MMHG | SYSTOLIC BLOOD PRESSURE: 152 MMHG

## 2019-01-08 RX ADMIN — ATORVASTATIN CALCIUM SCH MG: 20 TABLET, FILM COATED ORAL at 20:11

## 2019-01-08 RX ADMIN — LIDOCAINE SCH PATCH: 50 PATCH CUTANEOUS at 08:20

## 2019-01-08 RX ADMIN — CARVEDILOL SCH MG: 6.25 TABLET, FILM COATED ORAL at 08:16

## 2019-01-08 RX ADMIN — BUDESONIDE SCH MG: 0.5 SUSPENSION RESPIRATORY (INHALATION) at 11:26

## 2019-01-08 RX ADMIN — MULTIPLE VITAMINS W/ MINERALS TAB SCH TAB: TAB at 08:17

## 2019-01-08 RX ADMIN — ALBUTEROL SULFATE SCH MG: 108 AEROSOL, METERED RESPIRATORY (INHALATION) at 15:29

## 2019-01-08 RX ADMIN — QUETIAPINE FUMARATE SCH MG: 25 TABLET, FILM COATED ORAL at 12:32

## 2019-01-08 RX ADMIN — HYDROCODONE BITARTRATE AND ACETAMINOPHEN PRN TAB: 5; 325 TABLET ORAL at 12:39

## 2019-01-08 RX ADMIN — HYDROCODONE BITARTRATE AND ACETAMINOPHEN PRN TAB: 5; 325 TABLET ORAL at 21:33

## 2019-01-08 RX ADMIN — ALBUTEROL SULFATE SCH MG: 108 AEROSOL, METERED RESPIRATORY (INHALATION) at 11:26

## 2019-01-08 RX ADMIN — Medication SCH EA: at 20:11

## 2019-01-08 RX ADMIN — BUDESONIDE SCH MG: 0.5 SUSPENSION RESPIRATORY (INHALATION) at 20:08

## 2019-01-08 RX ADMIN — ENOXAPARIN SODIUM SCH MG: 100 INJECTION SUBCUTANEOUS at 08:15

## 2019-01-08 RX ADMIN — DONEPEZIL HYDROCHLORIDE SCH MG: 10 TABLET ORAL at 20:12

## 2019-01-08 RX ADMIN — CARVEDILOL SCH MG: 6.25 TABLET, FILM COATED ORAL at 16:14

## 2019-01-08 RX ADMIN — ALBUTEROL SULFATE SCH MG: 108 AEROSOL, METERED RESPIRATORY (INHALATION) at 05:23

## 2019-01-08 RX ADMIN — SERTRALINE SCH MG: 25 TABLET, FILM COATED ORAL at 08:17

## 2019-01-08 RX ADMIN — PANTOPRAZOLE SODIUM SCH MG: 40 TABLET, DELAYED RELEASE ORAL at 08:17

## 2019-01-08 RX ADMIN — I-VITE, TAB 1000-60-2MG (60/BT) SCH TAB: TAB at 08:17

## 2019-01-08 RX ADMIN — DIVALPROEX SODIUM SCH MG: 125 CAPSULE, COATED PELLETS ORAL at 20:11

## 2019-01-08 RX ADMIN — LEVOTHYROXINE SODIUM SCH MCG: 75 TABLET ORAL at 05:51

## 2019-01-08 RX ADMIN — DIVALPROEX SODIUM SCH MG: 125 CAPSULE, COATED PELLETS ORAL at 08:16

## 2019-01-08 RX ADMIN — ASPIRIN SCH MG: 325 TABLET, DELAYED RELEASE ORAL at 08:15

## 2019-01-08 RX ADMIN — ALBUTEROL SULFATE SCH MG: 108 AEROSOL, METERED RESPIRATORY (INHALATION) at 20:08

## 2019-01-08 RX ADMIN — LISINOPRIL SCH MG: 10 TABLET ORAL at 08:16

## 2019-01-08 NOTE — PDOC
Exam


Note:


Marquise Note:


Please also refer to the separate dictated note~for this date of service 

dictated separately.~Patient seen individually. Discussed the patient with 

Nursing staff reviewed the chart.~Reviewed interim history and current 

functioning. Reviewed vital signs,~Labs/ Radiology~and current medications 

noted below. Continue current treatment with the changes noted in the dictated 

addendum note





Assessment:


Vital Signs:





 Vital Signs








  Date Time  Temp Pulse Resp B/P (MAP) Pulse Ox O2 Delivery O2 Flow Rate FiO2


 


1/8/19 21:33      Nasal Cannula 2.0 


 


1/8/19 20:00     97   


 


1/8/19 15:47 99.1 64 20 152/83 (106)    








I&O











Intake and Output 


 


 1/8/19





 07:01


 


Intake Total 1200 ml


 


Balance 1200 ml


 


 


 


Intake Oral 1200 ml


 


# Bowel Movements 2











Current Medications:


Meds:





Current Medications


Multi-Ingredient Ointment (Analgesic Balm) 1 louis PRN QID  PRN TP MUSCLE PAIN;  

Start 12/21/18 at 18:45


Al Hydroxide/Mg Hydroxide (Mylanta Plus Xs) 15 ml PRN AFTMEALHC  PRN PO 

DYSPEPSIA;  Start 12/21/18 at 18:45


Magnesium Hydroxide (Milk Of Magnesia) 2,400 mg PRN QHS  PRN PO CONSTIPATION;  

Start 12/21/18 at 18:45


Aspirin (Aspirin Enteric Coated) 325 mg DAILY PO  Last administered on 1/8/19at 

08:15;  Start 12/22/18 at 09:00


Atorvastatin Calcium (Lipitor) 20 mg QHS PO  Last administered on 1/8/19at 20:11

;  Start 12/21/18 at 21:00


Estrogens Conjugated (Premarin) 0.5 louis PRN DAILY  PRN VG vaginal dryness;  

Start 12/21/18 at 19:15


Acetaminophen/ Hydrocodone Bitart (Lortab 5/325) 1 tab PRN Q12HR  PRN PO PAIN 

Last administered on 12/24/18at 09:06;  Start 12/21/18 at 19:15;  Stop 12/24/18 

at 15:02;  Status DC


Ipratropium Bromide (Atrovent) 0.2 mg PRN Q6HRS  PRN IH SHORTNESS OF BREATH;  

Start 12/21/18 at 19:15


Levothyroxine Sodium (Synthroid) 75 mcg DAILYAC PO  Last administered on 12/22/ 18at 08:40;  Start 12/22/18 at 07:30;  Stop 12/22/18 at 12:26;  Status DC


Lisinopril (Prinivil) 10 mg DAILY PO  Last administered on 1/8/19at 08:16;  

Start 12/22/18 at 09:00


Acetaminophen (Tylenol) 650 mg PRN Q6HRS  PRN PO PAIN / TEMP Last administered 

on 1/4/19at 06:01;  Start 12/21/18 at 19:45


Non-Formulary Medication (Albuterol Sulfate (Albuterol Sulfate Conc Neb Soln)) 

2.5 mg PRN Q6HRS  PRN NEB SHORTNESS OF BREATH;  Start 12/21/18 at 19:15;  

Status UNV


Amlodipine Besylate (Norvasc) 10 mg DAILY PO  Last administered on 12/25/18at 08

:38;  Start 12/22/18 at 09:00;  Stop 12/25/18 at 12:27;  Status DC


Non-Formulary Medication (Budesonide/ Formoterol Fumarate (Symbicort 160-4.5 

Mcg Inhaler)) 2 puff BID IH ;  Start 12/21/18 at 21:00;  Status UNV


Carvedilol (Coreg) 6.25 mg BIDWMEALS PO  Last administered on 1/8/19at 08:16;  

Start 12/21/18 at 20:00


Divalproex Sodium (Depakote Er) 750 mg QHS PO  Last administered on 12/21/18at 

20:53;  Start 12/21/18 at 21:00;  Stop 12/22/18 at 21:13;  Status DC


Docusate Sodium (Colace) 100 mg PRN DAILY  PRN PO HARD STOOLS;  Start 12/21/18 

at 19:45


Donepezil HCl (Aricept) 10 mg QHS PO  Last administered on 1/8/19at 20:12;  

Start 12/21/18 at 21:00


Enoxaparin Sodium (Lovenox 40mg Syringe) 40 mg DAILY SQ  Last administered on 1/ 8/19at 08:15;  Start 12/22/18 at 09:00


Hydralazine HCl (Apresoline) 10 mg TID PO  Last administered on 12/25/18at 08:39

;  Start 12/21/18 at 21:00;  Stop 12/25/18 at 12:27;  Status DC


Multivitamins/ Calcium (Thera-M Plus) 1 tab DAILY PO  Last administered on 1/8/ 19at 08:17;  Start 12/22/18 at 09:00


Olanzapine (ZyPREXA) 5 mg PRN DAILY  PRN PO ANXIETY/AGITATION Last administered 

on 12/23/18at 10:50;  Start 12/22/18 at 09:00


Pantoprazole Sodium (Protonix) 40 mg DAILYAC PO  Last administered on 1/8/19at 

08:17;  Start 12/22/18 at 07:30


Phenazopyridine HCl (Pyridium) 100 mg PRN TID  PRN PO URINARY PAIN;  Start 12/21 /18 at 19:45


Polyethylene Glycol (miraLAX) 17 gm PRN BID  PRN PO CONSTIPATION;  Start 12/22/ 18 at 09:00


Sodium Chloride (Saline Mist Nasal) 1 louis QID NS ;  Start 12/21/18 at 21:00;  

Status Cancel


Trazodone HCl (Desyrel) 25 mg PRN BID  PRN PO ANXIETY/AGITATION;  Start 12/21/ 18 at 19:45


Trazodone HCl (Desyrel) 50 mg PRN QHS  PRN PO INSOMNIA;  Start 12/21/18 at 19:45


Lidocaine (Lidoderm) 1 patch DAILY TD  Last administered on 1/8/19at 08:20;  

Start 12/22/18 at 09:00


Multivitamins/ Minerals (I-Irina) 1 tab DAILY PO  Last administered on 1/8/19at 

08:17;  Start 12/22/18 at 09:00


Miscellaneous (Lidoderm Patch Removal) 1 ea QHS MC  Last administered on 1/8/ 19at 20:11;  Start 12/21/18 at 21:00


Albuterol Sulfate (Ventolin) 2.5 mg PRN Q6HRS  PRN NEB SHORTNESS OF BREATH;  

Start 12/21/18 at 19:45


Albuterol Sulfate (Ventolin) 2.5 mg RTQID NEB  Last administered on 1/8/19at 20:

08;  Start 12/21/18 at 20:00


Budesonide (Pulmicort) 0.5 mg RTBID NEB  Last administered on 1/8/19at 20:08;  

Start 12/21/18 at 20:00


Sodium Chloride (Ayr Saline Nasal) 2 louis QID NS ;  Start 12/21/18 at 21:00;  

Stop 12/22/18 at 13:08;  Status DC


Levothyroxine Sodium (Synthroid) 75 mcg DAILY06 PO  Last administered on 1/8/ 19at 05:51;  Start 12/23/18 at 06:00


Sodium Chloride (Saline Mist Nasal) 1 louis PRN QID  PRN NS NASAL CONGESTION;  

Start 12/22/18 at 13:15


Divalproex Sodium (Depakote Sprinkles) 750 mg HS PO  Last administered on 12/23/ 18at 20:20;  Start 12/22/18 at 21:30;  Stop 12/24/18 at 14:27;  Status DC


Sertraline HCl (Zoloft) 25 mg DAILY PO  Last administered on 12/25/18at 08:39;  

Start 12/23/18 at 09:00;  Stop 12/25/18 at 09:01;  Status DC


Sertraline HCl (Zoloft) 50 mg DAILY PO  Last administered on 1/8/19at 08:17;  

Start 12/26/18 at 09:00


Divalproex Sodium (Depakote Sprinkles) 500 mg BID94 PO  Last administered on 12/ 24/18at 07:55;  Start 12/24/18 at 09:00;  Stop 12/24/18 at 14:27;  Status DC


Quetiapine Fumarate (SEROquel) 12.5 mg 1300 PO  Last administered on 1/8/19at 12

:32;  Start 12/24/18 at 13:00


Divalproex Sodium (Depakote Sprinkles) 500 mg BID PO  Last administered on 12/26 /18at 09:59;  Start 12/24/18 at 21:00;  Stop 12/26/18 at 17:43;  Status DC


Acetaminophen/ Hydrocodone Bitart (Lortab 5/325) 1 tab PRN Q6HRS  PRN PO PAIN 

Last administered on 1/8/19at 21:33;  Start 12/24/18 at 15:15


Olanzapine (ZyPREXA ZYDIS) 2.5 mg PRN Q2HR  PRN PO PSYCHOSIS;  Start 12/24/18 

at 19:15


Divalproex Sodium (Depakote Sprinkles) 625 mg BID PO  Last administered on 1/8/ 19at 20:11;  Start 12/26/18 at 21:00


Ondansetron HCl (Zofran Odt) 4 mg PRN Q8HRS  PRN PO NAUSEA/VOMITING Last 

administered on 1/3/19at 08:30;  Start 1/3/19 at 08:30





Active Scripts


Active


Reported


[occuvite]   1 Tab PO DAILY


Pyridium (Phenazopyridine Hcl) 100 Mg Tablet 100 Mg PO PRN TID PRN


Trazodone Hcl 50 Mg Tablet 25 Mg PO PRN QHS PRN


Premarin (Estrogens, Conjugated) 30 Gm Cream.appl 0.5 Gm VG PRN DAILY PRN


Multivitamins (Multivitamin) 1 Each Tablet 1 Tab PO DAILY


Trazodone Hcl 50 Mg Tablet 25 Mg PO BID PRN


Ayr Saline (Sodium Chloride) 50 Ml Drops 2 Drop NS QID


Miralax (Polyethylene Glycol 3350) 17 Gm Powd.pack 17 Gm PO PRN BID PRN


Protonix (Pantoprazole Sodium) 40 Mg Tablet.dr 40 Mg PO DAILY


Zyprexa (Olanzapine) 5 Mg Tablet 5 Mg PO PRN DAILY PRN


Lisinopril 10 Mg Tablet 10 Mg PO DAILY


[lidoderm]   1 Patch TD DAILY


Levothyroxine Sodium 75 Mcg Tablet 75 Mcg PO DAILYAC


Ipratropium Bromide 0.2 Mg/1 Ml Solution 0.2 Mg IH PRN Q6HRS PRN


Hydrocodone-Apap 5-325  ** (Hydrocodone Bit/Acetaminophen) 1 Each Tablet 1 Tab 

PO PRN Q12HR PRN


Hydralazine Hcl 10 Mg Tablet 10 Mg PO TID


Lovenox (Enoxaparin Sodium) 40 Mg/0.4 Ml Disp.syrin 40 Mg SQ DAILY


Donepezil Hcl 10 Mg Tablet 10 Mg PO HS


Colace (Docusate Sodium) 100 Mg Capsule 100 Mg PO PRN DAILY PRN


Depakote Er (Divalproex Sodium) 500 Mg Tab.er.24h 750 Mg PO HS


Coreg  ** (Carvedilol) 6.25 Mg Tablet 6.25 Mg PO BIDWMEALS


Symbicort 160-4.5 Mcg Inhaler (Budesonide/Formoterol Fumarate) 10.2 Gm 

Hfa.aer.ad 2 Puff IH BID


Lipitor (Atorvastatin Calcium) 20 Mg Tablet 20 Mg PO QHS


Aspirin Ec (Aspirin) 325 Mg Tablet.dr 325 Mg PO DAILY


Amlodipine Besylate 10 Mg Tablet 10 Mg PO DAILY


Albuterol Sulfate Conc Neb Soln (Albuterol Sulfate) 2.5 Mg/0.5 Ml Vial.neb 2.5 

Mg NEB PRN Q6HRS PRN


Acetaminophen 650 Mg/20.3 Ml Solution 650 Mg PO PRN Q6HRS PRN


I have reviewed the current psychotropics carefully including drug 

interactions.  Risk benefit ratio favors no change other than as noted in my 

dictated progress note.





Diagnosis:


Problems:  


(1) Anxiety disorder


(2) Major depressive disorder, recurrent episode


(3) Psychosis, atypical


(4) Impulse control disorder











JULY ZAMUDIO MD Jan 8, 2019 22:58

## 2019-01-09 VITALS — DIASTOLIC BLOOD PRESSURE: 72 MMHG | SYSTOLIC BLOOD PRESSURE: 146 MMHG

## 2019-01-09 VITALS — DIASTOLIC BLOOD PRESSURE: 84 MMHG | SYSTOLIC BLOOD PRESSURE: 153 MMHG

## 2019-01-09 RX ADMIN — LIDOCAINE SCH PATCH: 50 PATCH CUTANEOUS at 09:14

## 2019-01-09 RX ADMIN — ASPIRIN SCH MG: 325 TABLET, DELAYED RELEASE ORAL at 09:15

## 2019-01-09 RX ADMIN — PANTOPRAZOLE SODIUM SCH MG: 40 TABLET, DELAYED RELEASE ORAL at 09:15

## 2019-01-09 RX ADMIN — Medication SCH EA: at 20:34

## 2019-01-09 RX ADMIN — ENOXAPARIN SODIUM SCH MG: 100 INJECTION SUBCUTANEOUS at 09:15

## 2019-01-09 RX ADMIN — DIVALPROEX SODIUM SCH MG: 125 CAPSULE, COATED PELLETS ORAL at 20:27

## 2019-01-09 RX ADMIN — ALBUTEROL SULFATE SCH MG: 108 AEROSOL, METERED RESPIRATORY (INHALATION) at 20:00

## 2019-01-09 RX ADMIN — MULTIPLE VITAMINS W/ MINERALS TAB SCH TAB: TAB at 09:15

## 2019-01-09 RX ADMIN — HYDROCODONE BITARTRATE AND ACETAMINOPHEN PRN TAB: 5; 325 TABLET ORAL at 20:29

## 2019-01-09 RX ADMIN — DIVALPROEX SODIUM SCH MG: 125 CAPSULE, COATED PELLETS ORAL at 09:15

## 2019-01-09 RX ADMIN — ALBUTEROL SULFATE SCH MG: 108 AEROSOL, METERED RESPIRATORY (INHALATION) at 05:02

## 2019-01-09 RX ADMIN — ALBUTEROL SULFATE SCH MG: 108 AEROSOL, METERED RESPIRATORY (INHALATION) at 16:08

## 2019-01-09 RX ADMIN — BUDESONIDE SCH MG: 0.5 SUSPENSION RESPIRATORY (INHALATION) at 08:00

## 2019-01-09 RX ADMIN — CARVEDILOL SCH MG: 6.25 TABLET, FILM COATED ORAL at 17:05

## 2019-01-09 RX ADMIN — ATORVASTATIN CALCIUM SCH MG: 20 TABLET, FILM COATED ORAL at 20:27

## 2019-01-09 RX ADMIN — BUDESONIDE SCH MG: 0.5 SUSPENSION RESPIRATORY (INHALATION) at 20:00

## 2019-01-09 RX ADMIN — I-VITE, TAB 1000-60-2MG (60/BT) SCH TAB: TAB at 09:15

## 2019-01-09 RX ADMIN — ALBUTEROL SULFATE SCH MG: 108 AEROSOL, METERED RESPIRATORY (INHALATION) at 10:27

## 2019-01-09 RX ADMIN — CARVEDILOL SCH MG: 6.25 TABLET, FILM COATED ORAL at 09:16

## 2019-01-09 RX ADMIN — SERTRALINE SCH MG: 25 TABLET, FILM COATED ORAL at 09:15

## 2019-01-09 RX ADMIN — LEVOTHYROXINE SODIUM SCH MCG: 75 TABLET ORAL at 06:00

## 2019-01-09 RX ADMIN — LISINOPRIL SCH MG: 10 TABLET ORAL at 09:16

## 2019-01-09 RX ADMIN — QUETIAPINE FUMARATE SCH MG: 25 TABLET, FILM COATED ORAL at 12:38

## 2019-01-09 RX ADMIN — DONEPEZIL HYDROCHLORIDE SCH MG: 10 TABLET ORAL at 20:27

## 2019-01-09 NOTE — PN
DATE:  01/07/2019



PSYCHIATRIC PROGRESS NOTE



This is late entry 01/07/2019 covers elements not covered in my initial note.



SUBJECTIVE:  I met with the patient in the evening.  The patient has been a

little more interactive, appropriate, slept 6 hours previous night.  The

facility visited her social service staff, arranging transition.



REVIEW OF SYSTEMS:  Shortness of breath, on O2 supplements, impaired ambulation,

in wheelchair.  No CV, , GI, eye system symptoms on review.



MENTAL STATUS EXAM:  Oriented to herself and situation.  Speech has some

latency, coherent.  Abstraction fair, computation impaired, language function

intact, attention span short.  Mood and affect improved.



LABORATORY DATA:  Reviewed.



IMPRESSION:  Bipolar 1 disorder, unspecified; major neurocognitive disorder,

early Alzheimer, vascular with delusion, major depressive disorder, in partial

remission; anxiety disorder, unspecified.



PLAN:  No change from initial note.





______________________________

MAN ARLENE ZAMUDIO MD



DR:  GINNA/christiano  JOB#:  [id number] / [id number]

DD:  01/08/2019 15:57  DT:  01/09/2019 01:10

## 2019-01-09 NOTE — PN
DATE:  01/08/2019



PSYCHIATRIC PROGRESS NOTE



This late entry 01/08/2018 covers elements not covered in my initial note.



SUBJECTIVE:  I met with the patient in the evening.  The patient slept 7 hours

previous night.  She was denied at [facility] and

was somewhat upset about this as I met with her in the evening.  We processed

this and I reassured her that the social service staff is looking at alternate

placements.  She did well at night and reasonably well during the day.  She

seems less anxious, less irritable.



REVIEW OF SYSTEMS:  Shortness of breath on O2 supplements, impaired ambulation,

in wheelchair.  No CV, , GI, eye system symptoms on review.



MENTAL STATUS EXAM:  Oriented to herself and situation.  Speech has some

latency, coherent.  Abstraction fair, computation impaired, language function

intact, attention span short.  Mood and affect still somewhat anxious and she

was obsessed.  She called me back a couple of times because she had further

questions.  She was afraid and concerned that the social service staff are

looking for other placements and her son, Adriel gets her social security check,

but that he would part with It.  I will keep it all to himself and then how

would her placement be paid for.  I reassured her social service staff is

looking into it.  No suicidal or homicidal ideation.  Attention span short. 

Language function intact.  Overall, showing improvement.



LABORATORY DATA:  Reviewed.



IMPRESSION:  History of bipolar 1 disorder, mixed; major neurocognitive

disorder, early Alzheimer, vascular with depression; anxiety disorder,

unspecified.  Rest unchanged.



PLAN:  Continue psychotropics from initial note and we addressed this at some

length.





______________________________

MAN ARLENE ZAMUDIO MD



DR:  GINNA/christiano  JOB#:  [id number] / [id number]

DD:  01/09/2019 09:52  DT:  01/09/2019 17:26

## 2019-01-09 NOTE — PDOC
Exam


Note:


Marquise Note:


Please also refer to the separate dictated note~for this date of service 

dictated separately.~Patient seen individually. Discussed the patient with 

Nursing staff reviewed the chart.~Reviewed interim history and current 

functioning. Reviewed vital signs,~Labs/ Radiology~and current medications 

noted below. Continue current treatment with the changes noted in the dictated 

addendum note





Assessment:


Vital Signs:





 Vital Signs








  Date Time  Temp Pulse Resp B/P (MAP) Pulse Ox O2 Delivery O2 Flow Rate FiO2


 


1/9/19 20:29      Nasal Cannula  


 


1/9/19 19:55     98  2.0 


 


1/9/19 17:05  67  153/84    


 


1/9/19 16:21 98.3  20     








I&O











Intake and Output 


 


 1/9/19





 07:01


 


Intake Total 1440 ml


 


Balance 1440 ml


 


 


 


Intake Oral 1440 ml











Current Medications:


Meds:





Current Medications


Multi-Ingredient Ointment (Analgesic Balm) 1 louis PRN QID  PRN TP MUSCLE PAIN;  

Start 12/21/18 at 18:45


Al Hydroxide/Mg Hydroxide (Mylanta Plus Xs) 15 ml PRN AFTMEALHC  PRN PO 

DYSPEPSIA;  Start 12/21/18 at 18:45


Magnesium Hydroxide (Milk Of Magnesia) 2,400 mg PRN QHS  PRN PO CONSTIPATION;  

Start 12/21/18 at 18:45


Aspirin (Aspirin Enteric Coated) 325 mg DAILY PO  Last administered on 1/9/19at 

09:15;  Start 12/22/18 at 09:00


Atorvastatin Calcium (Lipitor) 20 mg QHS PO  Last administered on 1/9/19at 20:27

;  Start 12/21/18 at 21:00


Estrogens Conjugated (Premarin) 0.5 louis PRN DAILY  PRN VG vaginal dryness;  

Start 12/21/18 at 19:15


Acetaminophen/ Hydrocodone Bitart (Lortab 5/325) 1 tab PRN Q12HR  PRN PO PAIN 

Last administered on 12/24/18at 09:06;  Start 12/21/18 at 19:15;  Stop 12/24/18 

at 15:02;  Status DC


Ipratropium Bromide (Atrovent) 0.2 mg PRN Q6HRS  PRN IH SHORTNESS OF BREATH;  

Start 12/21/18 at 19:15


Levothyroxine Sodium (Synthroid) 75 mcg DAILYAC PO  Last administered on 12/22/ 18at 08:40;  Start 12/22/18 at 07:30;  Stop 12/22/18 at 12:26;  Status DC


Lisinopril (Prinivil) 10 mg DAILY PO  Last administered on 1/9/19at 09:16;  

Start 12/22/18 at 09:00


Acetaminophen (Tylenol) 650 mg PRN Q6HRS  PRN PO PAIN / TEMP Last administered 

on 1/4/19at 06:01;  Start 12/21/18 at 19:45


Non-Formulary Medication (Albuterol Sulfate (Albuterol Sulfate Conc Neb Soln)) 

2.5 mg PRN Q6HRS  PRN NEB SHORTNESS OF BREATH;  Start 12/21/18 at 19:15;  

Status UNV


Amlodipine Besylate (Norvasc) 10 mg DAILY PO  Last administered on 12/25/18at 08

:38;  Start 12/22/18 at 09:00;  Stop 12/25/18 at 12:27;  Status DC


Non-Formulary Medication (Budesonide/ Formoterol Fumarate (Symbicort 160-4.5 

Mcg Inhaler)) 2 puff BID IH ;  Start 12/21/18 at 21:00;  Status UNV


Carvedilol (Coreg) 6.25 mg BIDWMEALS PO  Last administered on 1/9/19at 17:05;  

Start 12/21/18 at 20:00


Divalproex Sodium (Depakote Er) 750 mg QHS PO  Last administered on 12/21/18at 

20:53;  Start 12/21/18 at 21:00;  Stop 12/22/18 at 21:13;  Status DC


Docusate Sodium (Colace) 100 mg PRN DAILY  PRN PO HARD STOOLS;  Start 12/21/18 

at 19:45


Donepezil HCl (Aricept) 10 mg QHS PO  Last administered on 1/9/19at 20:27;  

Start 12/21/18 at 21:00


Enoxaparin Sodium (Lovenox 40mg Syringe) 40 mg DAILY SQ  Last administered on 1/ 9/19at 09:15;  Start 12/22/18 at 09:00


Hydralazine HCl (Apresoline) 10 mg TID PO  Last administered on 12/25/18at 08:39

;  Start 12/21/18 at 21:00;  Stop 12/25/18 at 12:27;  Status DC


Multivitamins/ Calcium (Thera-M Plus) 1 tab DAILY PO  Last administered on 1/9/ 19at 09:15;  Start 12/22/18 at 09:00


Olanzapine (ZyPREXA) 5 mg PRN DAILY  PRN PO ANXIETY/AGITATION Last administered 

on 12/23/18at 10:50;  Start 12/22/18 at 09:00


Pantoprazole Sodium (Protonix) 40 mg DAILYAC PO  Last administered on 1/9/19at 

09:15;  Start 12/22/18 at 07:30


Phenazopyridine HCl (Pyridium) 100 mg PRN TID  PRN PO URINARY PAIN;  Start 12/21 /18 at 19:45


Polyethylene Glycol (miraLAX) 17 gm PRN BID  PRN PO CONSTIPATION;  Start 12/22/ 18 at 09:00


Sodium Chloride (Saline Mist Nasal) 1 louis QID NS ;  Start 12/21/18 at 21:00;  

Status Cancel


Trazodone HCl (Desyrel) 25 mg PRN BID  PRN PO ANXIETY/AGITATION;  Start 12/21/ 18 at 19:45


Trazodone HCl (Desyrel) 50 mg PRN QHS  PRN PO INSOMNIA;  Start 12/21/18 at 19:45


Lidocaine (Lidoderm) 1 patch DAILY TD  Last administered on 1/9/19at 09:14;  

Start 12/22/18 at 09:00


Multivitamins/ Minerals (I-Irina) 1 tab DAILY PO  Last administered on 1/9/19at 

09:15;  Start 12/22/18 at 09:00


Miscellaneous (Lidoderm Patch Removal) 1 ea QHS MC  Last administered on 1/9/ 19at 20:34;  Start 12/21/18 at 21:00


Albuterol Sulfate (Ventolin) 2.5 mg PRN Q6HRS  PRN NEB SHORTNESS OF BREATH;  

Start 12/21/18 at 19:45


Albuterol Sulfate (Ventolin) 2.5 mg RTQID NEB  Last administered on 1/9/19at 20:

00;  Start 12/21/18 at 20:00


Budesonide (Pulmicort) 0.5 mg RTBID NEB  Last administered on 1/9/19at 20:00;  

Start 12/21/18 at 20:00


Sodium Chloride (Ayr Saline Nasal) 2 louis QID NS ;  Start 12/21/18 at 21:00;  

Stop 12/22/18 at 13:08;  Status DC


Levothyroxine Sodium (Synthroid) 75 mcg DAILY06 PO  Last administered on 1/8/ 19at 05:51;  Start 12/23/18 at 06:00


Sodium Chloride (Saline Mist Nasal) 1 louis PRN QID  PRN NS NASAL CONGESTION;  

Start 12/22/18 at 13:15


Divalproex Sodium (Depakote Sprinkles) 750 mg HS PO  Last administered on 12/23/ 18at 20:20;  Start 12/22/18 at 21:30;  Stop 12/24/18 at 14:27;  Status DC


Sertraline HCl (Zoloft) 25 mg DAILY PO  Last administered on 12/25/18at 08:39;  

Start 12/23/18 at 09:00;  Stop 12/25/18 at 09:01;  Status DC


Sertraline HCl (Zoloft) 50 mg DAILY PO  Last administered on 1/9/19at 09:15;  

Start 12/26/18 at 09:00


Divalproex Sodium (Depakote Sprinkles) 500 mg BID94 PO  Last administered on 12/ 24/18at 07:55;  Start 12/24/18 at 09:00;  Stop 12/24/18 at 14:27;  Status DC


Quetiapine Fumarate (SEROquel) 12.5 mg 1300 PO  Last administered on 1/9/19at 12

:38;  Start 12/24/18 at 13:00


Divalproex Sodium (Depakote Sprinkles) 500 mg BID PO  Last administered on 12/26 /18at 09:59;  Start 12/24/18 at 21:00;  Stop 12/26/18 at 17:43;  Status DC


Acetaminophen/ Hydrocodone Bitart (Lortab 5/325) 1 tab PRN Q6HRS  PRN PO PAIN 

Last administered on 1/9/19at 20:29;  Start 12/24/18 at 15:15


Olanzapine (ZyPREXA ZYDIS) 2.5 mg PRN Q2HR  PRN PO PSYCHOSIS;  Start 12/24/18 

at 19:15


Divalproex Sodium (Depakote Sprinkles) 625 mg BID PO  Last administered on 1/9/ 19at 20:27;  Start 12/26/18 at 21:00


Ondansetron HCl (Zofran Odt) 4 mg PRN Q8HRS  PRN PO NAUSEA/VOMITING Last 

administered on 1/3/19at 08:30;  Start 1/3/19 at 08:30





Active Scripts


Active


Reported


[occuvite]   1 Tab PO DAILY


Pyridium (Phenazopyridine Hcl) 100 Mg Tablet 100 Mg PO PRN TID PRN


Trazodone Hcl 50 Mg Tablet 25 Mg PO PRN QHS PRN


Premarin (Estrogens, Conjugated) 30 Gm Cream.appl 0.5 Gm VG PRN DAILY PRN


Multivitamins (Multivitamin) 1 Each Tablet 1 Tab PO DAILY


Trazodone Hcl 50 Mg Tablet 25 Mg PO BID PRN


Ayr Saline (Sodium Chloride) 50 Ml Drops 2 Drop NS QID


Miralax (Polyethylene Glycol 3350) 17 Gm Powd.pack 17 Gm PO PRN BID PRN


Protonix (Pantoprazole Sodium) 40 Mg Tablet. 40 Mg PO DAILY


Zyprexa (Olanzapine) 5 Mg Tablet 5 Mg PO PRN DAILY PRN


Lisinopril 10 Mg Tablet 10 Mg PO DAILY


[lidoderm]   1 Patch TD DAILY


Levothyroxine Sodium 75 Mcg Tablet 75 Mcg PO DAILYAC


Ipratropium Bromide 0.2 Mg/1 Ml Solution 0.2 Mg IH PRN Q6HRS PRN


Hydrocodone-Apap 5-325  ** (Hydrocodone Bit/Acetaminophen) 1 Each Tablet 1 Tab 

PO PRN Q12HR PRN


Hydralazine Hcl 10 Mg Tablet 10 Mg PO TID


Lovenox (Enoxaparin Sodium) 40 Mg/0.4 Ml Disp.syrin 40 Mg SQ DAILY


Donepezil Hcl 10 Mg Tablet 10 Mg PO HS


Colace (Docusate Sodium) 100 Mg Capsule 100 Mg PO PRN DAILY PRN


Depakote Er (Divalproex Sodium) 500 Mg Tab.er.24h 750 Mg PO HS


Coreg  ** (Carvedilol) 6.25 Mg Tablet 6.25 Mg PO BIDWMEALS


Symbicort 160-4.5 Mcg Inhaler (Budesonide/Formoterol Fumarate) 10.2 Gm 

Hfa.aer.ad 2 Puff IH BID


Lipitor (Atorvastatin Calcium) 20 Mg Tablet 20 Mg PO QHS


Aspirin Ec (Aspirin) 325 Mg Tablet. 325 Mg PO DAILY


Amlodipine Besylate 10 Mg Tablet 10 Mg PO DAILY


Albuterol Sulfate Conc Neb Soln (Albuterol Sulfate) 2.5 Mg/0.5 Ml Vial.neb 2.5 

Mg NEB PRN Q6HRS PRN


Acetaminophen 650 Mg/20.3 Ml Solution 650 Mg PO PRN Q6HRS PRN


I have reviewed the current psychotropics carefully including drug 

interactions.  Risk benefit ratio favors no change other than as noted in my 

dictated progress note.





Diagnosis:


Problems:  


(1) Anxiety disorder


(2) Major depressive disorder, recurrent episode


(3) Psychosis, atypical


(4) Impulse control disorder











JULY ZAMUDIO MD Jan 9, 2019 22:47

## 2019-01-10 VITALS — SYSTOLIC BLOOD PRESSURE: 162 MMHG | DIASTOLIC BLOOD PRESSURE: 89 MMHG

## 2019-01-10 VITALS — SYSTOLIC BLOOD PRESSURE: 174 MMHG | DIASTOLIC BLOOD PRESSURE: 79 MMHG

## 2019-01-10 LAB
ALBUMIN SERPL-MCNC: 2.6 G/DL (ref 3.4–5)
ALBUMIN/GLOB SERPL: 0.7 {RATIO} (ref 1–1.7)
ALP SERPL-CCNC: 49 U/L (ref 46–116)
ALT SERPL-CCNC: 16 U/L (ref 14–59)
ANION GAP SERPL CALC-SCNC: 4 MMOL/L (ref 6–14)
AST SERPL-CCNC: 12 U/L (ref 15–37)
BASOPHILS # BLD AUTO: 0 X10^3/UL (ref 0–0.2)
BASOPHILS NFR BLD: 1 % (ref 0–3)
BILIRUB SERPL-MCNC: 0.2 MG/DL (ref 0.2–1)
BUN/CREAT SERPL: 19 (ref 6–20)
CA-I SERPL ISE-MCNC: 13 MG/DL (ref 7–20)
CALCIUM SERPL-MCNC: 8.3 MG/DL (ref 8.5–10.1)
CHLORIDE SERPL-SCNC: 101 MMOL/L (ref 98–107)
CO2 SERPL-SCNC: 35 MMOL/L (ref 21–32)
CREAT SERPL-MCNC: 0.7 MG/DL (ref 0.6–1)
EOSINOPHIL NFR BLD: 0.2 X10^3/UL (ref 0–0.7)
EOSINOPHIL NFR BLD: 3 % (ref 0–3)
ERYTHROCYTE [DISTWIDTH] IN BLOOD BY AUTOMATED COUNT: 14.6 % (ref 11.5–14.5)
GFR SERPLBLD BASED ON 1.73 SQ M-ARVRAT: 79.2 ML/MIN
GLOBULIN SER-MCNC: 3.7 G/DL (ref 2.2–3.8)
GLUCOSE SERPL-MCNC: 94 MG/DL (ref 70–99)
HCT VFR BLD CALC: 38.8 % (ref 36–47)
HGB BLD-MCNC: 13 G/DL (ref 12–15.5)
LYMPHOCYTES # BLD: 1.7 X10^3/UL (ref 1–4.8)
LYMPHOCYTES NFR BLD AUTO: 25 % (ref 24–48)
MCH RBC QN AUTO: 31 PG (ref 25–35)
MCHC RBC AUTO-ENTMCNC: 33 G/DL (ref 31–37)
MCV RBC AUTO: 92 FL (ref 79–100)
MONO #: 0.8 X10^3/UL (ref 0–1.1)
MONOCYTES NFR BLD: 12 % (ref 0–9)
NEUT #: 4 X10^3UL (ref 1.8–7.7)
NEUTROPHILS NFR BLD AUTO: 59 % (ref 31–73)
PLATELET # BLD AUTO: 173 X10^3/UL (ref 140–400)
POTASSIUM SERPL-SCNC: 4.4 MMOL/L (ref 3.5–5.1)
PROT SERPL-MCNC: 6.3 G/DL (ref 6.4–8.2)
RBC # BLD AUTO: 4.22 X10^6/UL (ref 3.5–5.4)
SODIUM SERPL-SCNC: 140 MMOL/L (ref 136–145)
WBC # BLD AUTO: 6.6 X10^3/UL (ref 4–11)

## 2019-01-10 RX ADMIN — ATORVASTATIN CALCIUM SCH MG: 20 TABLET, FILM COATED ORAL at 19:30

## 2019-01-10 RX ADMIN — PANTOPRAZOLE SODIUM SCH MG: 40 TABLET, DELAYED RELEASE ORAL at 09:25

## 2019-01-10 RX ADMIN — ALBUTEROL SULFATE SCH MG: 108 AEROSOL, METERED RESPIRATORY (INHALATION) at 04:55

## 2019-01-10 RX ADMIN — DIVALPROEX SODIUM SCH MG: 125 CAPSULE, COATED PELLETS ORAL at 09:25

## 2019-01-10 RX ADMIN — ENOXAPARIN SODIUM SCH MG: 100 INJECTION SUBCUTANEOUS at 09:26

## 2019-01-10 RX ADMIN — ALBUTEROL SULFATE SCH MG: 108 AEROSOL, METERED RESPIRATORY (INHALATION) at 19:55

## 2019-01-10 RX ADMIN — Medication SCH EA: at 19:30

## 2019-01-10 RX ADMIN — DONEPEZIL HYDROCHLORIDE SCH MG: 10 TABLET ORAL at 19:30

## 2019-01-10 RX ADMIN — CARVEDILOL SCH MG: 6.25 TABLET, FILM COATED ORAL at 14:52

## 2019-01-10 RX ADMIN — I-VITE, TAB 1000-60-2MG (60/BT) SCH TAB: TAB at 09:24

## 2019-01-10 RX ADMIN — SERTRALINE SCH MG: 25 TABLET, FILM COATED ORAL at 09:25

## 2019-01-10 RX ADMIN — DIVALPROEX SODIUM SCH MG: 125 CAPSULE, COATED PELLETS ORAL at 19:31

## 2019-01-10 RX ADMIN — Medication SCH CAP: at 19:35

## 2019-01-10 RX ADMIN — CARVEDILOL SCH MG: 6.25 TABLET, FILM COATED ORAL at 09:25

## 2019-01-10 RX ADMIN — LIDOCAINE SCH PATCH: 50 PATCH CUTANEOUS at 09:24

## 2019-01-10 RX ADMIN — ONDANSETRON PRN MG: 4 TABLET, ORALLY DISINTEGRATING ORAL at 10:50

## 2019-01-10 RX ADMIN — LISINOPRIL SCH MG: 10 TABLET ORAL at 09:24

## 2019-01-10 RX ADMIN — HYDROCODONE BITARTRATE AND ACETAMINOPHEN PRN TAB: 5; 325 TABLET ORAL at 12:48

## 2019-01-10 RX ADMIN — MULTIPLE VITAMINS W/ MINERALS TAB SCH TAB: TAB at 09:25

## 2019-01-10 RX ADMIN — BUDESONIDE SCH MG: 0.5 SUSPENSION RESPIRATORY (INHALATION) at 10:11

## 2019-01-10 RX ADMIN — ASPIRIN SCH MG: 325 TABLET, DELAYED RELEASE ORAL at 09:25

## 2019-01-10 RX ADMIN — LEVOTHYROXINE SODIUM SCH MCG: 75 TABLET ORAL at 05:58

## 2019-01-10 RX ADMIN — QUETIAPINE FUMARATE SCH MG: 25 TABLET, FILM COATED ORAL at 14:51

## 2019-01-10 RX ADMIN — ALBUTEROL SULFATE SCH MG: 108 AEROSOL, METERED RESPIRATORY (INHALATION) at 16:02

## 2019-01-10 RX ADMIN — ALBUTEROL SULFATE SCH MG: 108 AEROSOL, METERED RESPIRATORY (INHALATION) at 10:11

## 2019-01-10 RX ADMIN — BUDESONIDE SCH MG: 0.5 SUSPENSION RESPIRATORY (INHALATION) at 19:55

## 2019-01-10 NOTE — PDOC
Exam


Note:


Marquise Note:


Please also refer to the separate dictated note~for this date of service 

dictated separately.~Patient seen individually. Discussed the patient with 

Nursing staff reviewed the chart.~Reviewed interim history and current 

functioning. Reviewed vital signs,~Labs/ Radiology~and current medications 

noted below. Continue current treatment with the changes noted in the dictated 

addendum note





Assessment:


Vital Signs:





 Vital Signs








  Date Time  Temp Pulse Resp B/P (MAP) Pulse Ox O2 Delivery O2 Flow Rate FiO2


 


1/10/19 19:56     98 Nasal Cannula 2.0 


 


1/10/19 15:56 98.0 63 22 174/79 (110)    








I&O











Intake and Output 


 


 1/10/19





 07:01


 


Intake Total 480 ml


 


Balance 480 ml


 


 


 


Intake Oral 480 ml


 


# Bowel Movements 1








Labs:





 Laboratory Tests








Test


 1/10/19


17:20


 


White Blood Count


 6.6 x10^3/uL


(4.0-11.0)


 


Red Blood Count


 4.22 x10^6/uL


(3.50-5.40)


 


Hemoglobin


 13.0 g/dL


(12.0-15.5)


 


Hematocrit


 38.8 %


(36.0-47.0)


 


Mean Corpuscular Volume


 92 fL ()





 


Mean Corpuscular Hemoglobin 31 pg (25-35)  


 


Mean Corpuscular Hemoglobin


Concent 33 g/dL


(31-37)


 


Red Cell Distribution Width


 14.6 %


(11.5-14.5)  H


 


Platelet Count


 173 x10^3/uL


(140-400)


 


Neutrophils (%) (Auto) 59 % (31-73)  


 


Lymphocytes (%) (Auto) 25 % (24-48)  


 


Monocytes (%) (Auto) 12 % (0-9)  H


 


Eosinophils (%) (Auto) 3 % (0-3)  


 


Basophils (%) (Auto) 1 % (0-3)  


 


Neutrophils # (Auto)


 4.0 x10^3uL


(1.8-7.7)


 


Lymphocytes # (Auto)


 1.7 x10^3/uL


(1.0-4.8)


 


Monocytes # (Auto)


 0.8 x10^3/uL


(0.0-1.1)


 


Eosinophils # (Auto)


 0.2 x10^3/uL


(0.0-0.7)


 


Basophils # (Auto)


 0.0 x10^3/uL


(0.0-0.2)


 


Sodium Level


 140 mmol/L


(136-145)


 


Potassium Level


 4.4 mmol/L


(3.5-5.1)


 


Chloride Level


 101 mmol/L


()


 


Carbon Dioxide Level


 35 mmol/L


(21-32)  H


 


Anion Gap 4 (6-14)  L


 


Blood Urea Nitrogen


 13 mg/dL


(7-20)


 


Creatinine


 0.7 mg/dL


(0.6-1.0)


 


Estimated GFR


(Cockcroft-Gault) 79.2  





 


BUN/Creatinine Ratio 19 (6-20)  


 


Glucose Level


 94 mg/dL


(70-99)


 


Calcium Level


 8.3 mg/dL


(8.5-10.1)  L


 


Total Bilirubin


 0.2 mg/dL


(0.2-1.0)


 


Aspartate Amino Transferase


(AST) 12 U/L (15-37)


L


 


Alanine Aminotransferase (ALT)


 16 U/L (14-59)





 


Alkaline Phosphatase


 49 U/L


()


 


Total Protein


 6.3 g/dL


(6.4-8.2)  L


 


Albumin


 2.6 g/dL


(3.4-5.0)  L


 


Albumin/Globulin Ratio


 0.7 (1.0-1.7)


L











Current Medications:


Meds:





Current Medications


Multi-Ingredient Ointment (Analgesic Balm) 1 louis PRN QID  PRN TP MUSCLE PAIN;  

Start 12/21/18 at 18:45


Al Hydroxide/Mg Hydroxide (Mylanta Plus Xs) 15 ml PRN AFTMEALHC  PRN PO 

DYSPEPSIA;  Start 12/21/18 at 18:45


Magnesium Hydroxide (Milk Of Magnesia) 2,400 mg PRN QHS  PRN PO CONSTIPATION;  

Start 12/21/18 at 18:45


Aspirin (Aspirin Enteric Coated) 325 mg DAILY PO  Last administered on 1/10/

19at 09:25;  Start 12/22/18 at 09:00


Atorvastatin Calcium (Lipitor) 20 mg QHS PO  Last administered on 1/10/19at 19:

30;  Start 12/21/18 at 21:00


Estrogens Conjugated (Premarin) 0.5 louis PRN DAILY  PRN VG vaginal dryness;  

Start 12/21/18 at 19:15


Acetaminophen/ Hydrocodone Bitart (Lortab 5/325) 1 tab PRN Q12HR  PRN PO PAIN 

Last administered on 12/24/18at 09:06;  Start 12/21/18 at 19:15;  Stop 12/24/18 

at 15:02;  Status DC


Ipratropium Bromide (Atrovent) 0.2 mg PRN Q6HRS  PRN IH SHORTNESS OF BREATH;  

Start 12/21/18 at 19:15


Levothyroxine Sodium (Synthroid) 75 mcg DAILYAC PO  Last administered on 12/22/ 18at 08:40;  Start 12/22/18 at 07:30;  Stop 12/22/18 at 12:26;  Status DC


Lisinopril (Prinivil) 10 mg DAILY PO  Last administered on 1/10/19at 09:24;  

Start 12/22/18 at 09:00


Acetaminophen (Tylenol) 650 mg PRN Q6HRS  PRN PO PAIN / TEMP Last administered 

on 1/4/19at 06:01;  Start 12/21/18 at 19:45


Non-Formulary Medication (Albuterol Sulfate (Albuterol Sulfate Conc Neb Soln)) 

2.5 mg PRN Q6HRS  PRN NEB SHORTNESS OF BREATH;  Start 12/21/18 at 19:15;  

Status UNV


Amlodipine Besylate (Norvasc) 10 mg DAILY PO  Last administered on 12/25/18at 08

:38;  Start 12/22/18 at 09:00;  Stop 12/25/18 at 12:27;  Status DC


Non-Formulary Medication (Budesonide/ Formoterol Fumarate (Symbicort 160-4.5 

Mcg Inhaler)) 2 puff BID IH ;  Start 12/21/18 at 21:00;  Status UNV


Carvedilol (Coreg) 6.25 mg BIDWMEALS PO  Last administered on 1/10/19at 14:52;  

Start 12/21/18 at 20:00


Divalproex Sodium (Depakote Er) 750 mg QHS PO  Last administered on 12/21/18at 

20:53;  Start 12/21/18 at 21:00;  Stop 12/22/18 at 21:13;  Status DC


Docusate Sodium (Colace) 100 mg PRN DAILY  PRN PO HARD STOOLS;  Start 12/21/18 

at 19:45


Donepezil HCl (Aricept) 10 mg QHS PO  Last administered on 1/10/19at 19:30;  

Start 12/21/18 at 21:00


Enoxaparin Sodium (Lovenox 40mg Syringe) 40 mg DAILY SQ  Last administered on 1/

10/19at 09:26;  Start 12/22/18 at 09:00


Hydralazine HCl (Apresoline) 10 mg TID PO  Last administered on 12/25/18at 08:39

;  Start 12/21/18 at 21:00;  Stop 12/25/18 at 12:27;  Status DC


Multivitamins/ Calcium (Thera-M Plus) 1 tab DAILY PO  Last administered on 1/10/

19at 09:25;  Start 12/22/18 at 09:00


Olanzapine (ZyPREXA) 5 mg PRN DAILY  PRN PO ANXIETY/AGITATION Last administered 

on 12/23/18at 10:50;  Start 12/22/18 at 09:00


Pantoprazole Sodium (Protonix) 40 mg DAILYAC PO  Last administered on 1/10/19at 

09:25;  Start 12/22/18 at 07:30


Phenazopyridine HCl (Pyridium) 100 mg PRN TID  PRN PO URINARY PAIN;  Start 12/21 /18 at 19:45


Polyethylene Glycol (miraLAX) 17 gm PRN BID  PRN PO CONSTIPATION;  Start 12/22/ 18 at 09:00


Sodium Chloride (Saline Mist Nasal) 1 louis QID NS ;  Start 12/21/18 at 21:00;  

Status Cancel


Trazodone HCl (Desyrel) 25 mg PRN BID  PRN PO ANXIETY/AGITATION;  Start 12/21/ 18 at 19:45


Trazodone HCl (Desyrel) 50 mg PRN QHS  PRN PO INSOMNIA;  Start 12/21/18 at 19:45


Lidocaine (Lidoderm) 1 patch DAILY TD  Last administered on 1/10/19at 09:24;  

Start 12/22/18 at 09:00


Multivitamins/ Minerals (I-Irina) 1 tab DAILY PO  Last administered on 1/10/19at 

09:24;  Start 12/22/18 at 09:00


Miscellaneous (Lidoderm Patch Removal) 1 ea QHS MC  Last administered on 1/10/

19at 19:30;  Start 12/21/18 at 21:00


Albuterol Sulfate (Ventolin) 2.5 mg PRN Q6HRS  PRN NEB SHORTNESS OF BREATH;  

Start 12/21/18 at 19:45


Albuterol Sulfate (Ventolin) 2.5 mg RTQID NEB  Last administered on 1/10/19at 19

:55;  Start 12/21/18 at 20:00


Budesonide (Pulmicort) 0.5 mg RTBID NEB  Last administered on 1/10/19at 19:55;  

Start 12/21/18 at 20:00


Sodium Chloride (Ayr Saline Nasal) 2 louis QID NS ;  Start 12/21/18 at 21:00;  

Stop 12/22/18 at 13:08;  Status DC


Levothyroxine Sodium (Synthroid) 75 mcg DAILY06 PO  Last administered on 1/10/

19at 05:58;  Start 12/23/18 at 06:00


Sodium Chloride (Saline Mist Nasal) 1 louis PRN QID  PRN NS NASAL CONGESTION;  

Start 12/22/18 at 13:15


Divalproex Sodium (Depakote Sprinkles) 750 mg HS PO  Last administered on 12/23/ 18at 20:20;  Start 12/22/18 at 21:30;  Stop 12/24/18 at 14:27;  Status DC


Sertraline HCl (Zoloft) 25 mg DAILY PO  Last administered on 12/25/18at 08:39;  

Start 12/23/18 at 09:00;  Stop 12/25/18 at 09:01;  Status DC


Sertraline HCl (Zoloft) 50 mg DAILY PO  Last administered on 1/10/19at 09:25;  

Start 12/26/18 at 09:00


Divalproex Sodium (Depakote Sprinkles) 500 mg BID94 PO  Last administered on 12/ 24/18at 07:55;  Start 12/24/18 at 09:00;  Stop 12/24/18 at 14:27;  Status DC


Quetiapine Fumarate (SEROquel) 12.5 mg 1300 PO  Last administered on 1/10/19at 

14:51;  Start 12/24/18 at 13:00


Divalproex Sodium (Depakote Sprinkles) 500 mg BID PO  Last administered on 12/26 /18at 09:59;  Start 12/24/18 at 21:00;  Stop 12/26/18 at 17:43;  Status DC


Acetaminophen/ Hydrocodone Bitart (Lortab 5/325) 1 tab PRN Q6HRS  PRN PO PAIN 

Last administered on 1/10/19at 12:48;  Start 12/24/18 at 15:15


Olanzapine (ZyPREXA ZYDIS) 2.5 mg PRN Q2HR  PRN PO PSYCHOSIS;  Start 12/24/18 

at 19:15


Divalproex Sodium (Depakote Sprinkles) 625 mg BID PO  Last administered on 1/10/

19at 19:31;  Start 12/26/18 at 21:00


Ondansetron HCl (Zofran Odt) 4 mg PRN Q8HRS  PRN PO NAUSEA/VOMITING Last 

administered on 1/10/19at 10:50;  Start 1/3/19 at 08:30


Levofloxacin (Levaquin) 750 mg DAILY06 PO  Last administered on 1/10/19at 19:35

;  Start 1/10/19 at 19:45


Lactobacillus Rhamnosus (Culturelle) 1 cap BID PO  Last administered on 1/10/

19at 19:35;  Start 1/10/19 at 21:00





Active Scripts


Active


Reported


[occuvite]   1 Tab PO DAILY


Pyridium (Phenazopyridine Hcl) 100 Mg Tablet 100 Mg PO PRN TID PRN


Trazodone Hcl 50 Mg Tablet 25 Mg PO PRN QHS PRN


Premarin (Estrogens, Conjugated) 30 Gm Cream.appl 0.5 Gm VG PRN DAILY PRN


Multivitamins (Multivitamin) 1 Each Tablet 1 Tab PO DAILY


Trazodone Hcl 50 Mg Tablet 25 Mg PO BID PRN


Ayr Saline (Sodium Chloride) 50 Ml Drops 2 Drop NS QID


Miralax (Polyethylene Glycol 3350) 17 Gm Powd.pack 17 Gm PO PRN BID PRN


Protonix (Pantoprazole Sodium) 40 Mg Tablet.dr 40 Mg PO DAILY


Zyprexa (Olanzapine) 5 Mg Tablet 5 Mg PO PRN DAILY PRN


Lisinopril 10 Mg Tablet 10 Mg PO DAILY


[lidoderm]   1 Patch TD DAILY


Levothyroxine Sodium 75 Mcg Tablet 75 Mcg PO DAILYAC


Ipratropium Bromide 0.2 Mg/1 Ml Solution 0.2 Mg IH PRN Q6HRS PRN


Hydrocodone-Apap 5-325  ** (Hydrocodone Bit/Acetaminophen) 1 Each Tablet 1 Tab 

PO PRN Q12HR PRN


Hydralazine Hcl 10 Mg Tablet 10 Mg PO TID


Lovenox (Enoxaparin Sodium) 40 Mg/0.4 Ml Disp.syrin 40 Mg SQ DAILY


Donepezil Hcl 10 Mg Tablet 10 Mg PO HS


Colace (Docusate Sodium) 100 Mg Capsule 100 Mg PO PRN DAILY PRN


Depakote Er (Divalproex Sodium) 500 Mg Tab.er.24h 750 Mg PO HS


Coreg  ** (Carvedilol) 6.25 Mg Tablet 6.25 Mg PO BIDWMEALS


Symbicort 160-4.5 Mcg Inhaler (Budesonide/Formoterol Fumarate) 10.2 Gm 

Hfa.aer.ad 2 Puff IH BID


Lipitor (Atorvastatin Calcium) 20 Mg Tablet 20 Mg PO QHS


Aspirin Ec (Aspirin) 325 Mg Tablet.dr 325 Mg PO DAILY


Amlodipine Besylate 10 Mg Tablet 10 Mg PO DAILY


Albuterol Sulfate Conc Neb Soln (Albuterol Sulfate) 2.5 Mg/0.5 Ml Vial.neb 2.5 

Mg NEB PRN Q6HRS PRN


Acetaminophen 650 Mg/20.3 Ml Solution 650 Mg PO PRN Q6HRS PRN


I have reviewed the current psychotropics carefully including drug 

interactions.  Risk benefit ratio favors no change other than as noted in my 

dictated progress note.





Diagnosis:


Problems:  


(1) Anxiety disorder


(2) Major depressive disorder, recurrent episode


(3) Psychosis, atypical


(4) Impulse control disorder











JULY ZAMUDIO MD Pradeep 10, 2019 22:56

## 2019-01-10 NOTE — RAD
Examination: CHEST AP ONLY

 

History: Worsening shortness of breath

 

Comparison/Correlation: None

 

Findings: Portable upright frontal view of the chest was obtained. 

Dual-lead left-sided pacemaker is present. Heart size is normal. Small 

hiatal hernia is present. No infiltrate. Minimal right basilar discoid 

atelectasis is present. Mild retrocardiac linear atelectasis or scarring 

is present.

 

Vertebroplasty changes are noted involving the spine. Right upper quadrant

surgical clips are present. Advanced degenerative changes of the left 

glenohumeral joint.

 

 

Impression:

No focal infiltrate. Mild linear atelectasis involving the lung bases.

 

Small hiatal hernia.

 

Electronically signed by: Vincent Tao MD (1/10/2019 6:58 PM) [facility]

## 2019-01-10 NOTE — PN
DATE:  01/09/2019



PSYCHIATRIC PROGRESS NOTE



This late entry 01/09/2019 covers elements, not covered in my initial note.



SUBJECTIVE:  I met with the patient in the evening and staffed at a treatment

team meeting with the entire team in the afternoon, and the patient's

daughter-in-law, Allyson, attended the treatment team meeting.  We reviewed the

patient's history, diagnosis, placement options at length.  She has been denied

from several facilities due to her past suicide attempt and social service staff

is diligently looking at other options.  Nevertheless, in the interim, she is

doing better.  She slept 7-1/4 hours previous night.



REVIEW OF SYSTEMS:  Shortness of breath, on O2 supplements, impaired ambulation,

in wheelchair.  No CV, , pulmonary, eye, ENT system symptoms on review.



MENTAL STATUS EXAM:  Oriented to herself and situation.  Speech has some

latency, coherent.  Abstraction fair, computation impaired, language function

intact, attention span short.  Mood and affect showing improvement.  No suicidal

ideation.



LABORATORY DATA:  Reviewed.



IMPRESSION:  Major depressive disorder; anxiety disorder, unspecified; cognitive

disorder, unspecified; history of bipolar 1 disorder, depressed.



PLAN:  Continue psychotropics from initial note.  Adjust further as clinically

indicated.





______________________________

JULY ZAMUDIO MD



DR:  GINNA/christiano  JOB#:  [id number] / [id number]

DD:  01/10/2019 11:03  DT:  01/10/2019 20:07

## 2019-01-10 NOTE — PN
DATE:  01/10/2019



This note covers elements not covered in my initial note of 01/10/2019.



SUBJECTIVE:  I met with the patient in the afternoon.  The patient slept 6-3/4

hours previous evening.  Per nursing report, the patient has had a wet cough. 

She has been tired, withdrawn.  We will defer to Dr. Varela.  She did receive

Zofran and Lortab earlier.



REVIEW OF SYSTEMS:  Positive for being tired, difficulty breathing, on O2

supplements.  Hard of hearing.  No CV, , GI, eye system symptoms on review.



MENTAL STATUS EXAM:  Oriented to herself and situation.  Speech has some

latency, coherent, can be rapid at times.  Abstraction fair, computation

impaired, language function intact.  Mood and affect, somewhat anxious, at

times, but overall improved.



LABORATORY DATA:  Reviewed.



IMPRESSION:  Major depressive disorder, recurrent; anxiety disorder,

unspecified; rule out bipolar 1 disorder, mixed, cognitive disorder,

unspecified.



PLAN:  No change from initial note.





______________________________

MAN ARLENE ZAMUDIO MD



DR:  GINNA/[email or web address]  JOB#:  [id number] / [id number]

DD:  01/10/2019 14:56  DT:  01/10/2019 22:20

## 2019-01-11 VITALS — DIASTOLIC BLOOD PRESSURE: 72 MMHG | SYSTOLIC BLOOD PRESSURE: 145 MMHG

## 2019-01-11 VITALS — SYSTOLIC BLOOD PRESSURE: 129 MMHG | DIASTOLIC BLOOD PRESSURE: 63 MMHG

## 2019-01-11 RX ADMIN — Medication SCH CAP: at 08:02

## 2019-01-11 RX ADMIN — Medication SCH EA: at 19:37

## 2019-01-11 RX ADMIN — ALBUTEROL SULFATE SCH MG: 108 AEROSOL, METERED RESPIRATORY (INHALATION) at 11:46

## 2019-01-11 RX ADMIN — CARVEDILOL SCH MG: 6.25 TABLET, FILM COATED ORAL at 08:02

## 2019-01-11 RX ADMIN — CARVEDILOL SCH MG: 6.25 TABLET, FILM COATED ORAL at 17:28

## 2019-01-11 RX ADMIN — I-VITE, TAB 1000-60-2MG (60/BT) SCH TAB: TAB at 08:01

## 2019-01-11 RX ADMIN — QUETIAPINE FUMARATE SCH MG: 25 TABLET, FILM COATED ORAL at 13:00

## 2019-01-11 RX ADMIN — LEVOTHYROXINE SODIUM SCH MCG: 75 TABLET ORAL at 05:03

## 2019-01-11 RX ADMIN — LISINOPRIL SCH MG: 10 TABLET ORAL at 08:01

## 2019-01-11 RX ADMIN — ENOXAPARIN SODIUM SCH MG: 100 INJECTION SUBCUTANEOUS at 08:03

## 2019-01-11 RX ADMIN — SERTRALINE SCH MG: 25 TABLET, FILM COATED ORAL at 08:02

## 2019-01-11 RX ADMIN — MULTIPLE VITAMINS W/ MINERALS TAB SCH TAB: TAB at 08:02

## 2019-01-11 RX ADMIN — DIVALPROEX SODIUM SCH MG: 125 CAPSULE, COATED PELLETS ORAL at 19:38

## 2019-01-11 RX ADMIN — ONDANSETRON PRN MG: 4 TABLET, ORALLY DISINTEGRATING ORAL at 19:39

## 2019-01-11 RX ADMIN — BUDESONIDE SCH MG: 0.5 SUSPENSION RESPIRATORY (INHALATION) at 11:46

## 2019-01-11 RX ADMIN — DIVALPROEX SODIUM SCH MG: 125 CAPSULE, COATED PELLETS ORAL at 08:03

## 2019-01-11 RX ADMIN — ALBUTEROL SULFATE SCH MG: 108 AEROSOL, METERED RESPIRATORY (INHALATION) at 04:55

## 2019-01-11 RX ADMIN — BUDESONIDE SCH MG: 0.5 SUSPENSION RESPIRATORY (INHALATION) at 10:25

## 2019-01-11 RX ADMIN — ALBUTEROL SULFATE SCH MG: 108 AEROSOL, METERED RESPIRATORY (INHALATION) at 16:31

## 2019-01-11 RX ADMIN — ALBUTEROL SULFATE SCH MG: 108 AEROSOL, METERED RESPIRATORY (INHALATION) at 10:42

## 2019-01-11 RX ADMIN — BUDESONIDE SCH MG: 0.5 SUSPENSION RESPIRATORY (INHALATION) at 20:13

## 2019-01-11 RX ADMIN — LIDOCAINE SCH PATCH: 50 PATCH CUTANEOUS at 08:01

## 2019-01-11 RX ADMIN — DONEPEZIL HYDROCHLORIDE SCH MG: 10 TABLET ORAL at 19:38

## 2019-01-11 RX ADMIN — ASPIRIN SCH MG: 325 TABLET, DELAYED RELEASE ORAL at 08:02

## 2019-01-11 RX ADMIN — Medication SCH CAP: at 19:38

## 2019-01-11 RX ADMIN — PANTOPRAZOLE SODIUM SCH MG: 40 TABLET, DELAYED RELEASE ORAL at 08:02

## 2019-01-11 RX ADMIN — ATORVASTATIN CALCIUM SCH MG: 20 TABLET, FILM COATED ORAL at 19:38

## 2019-01-11 NOTE — PN
DATE:  01/11/2019



SUBJECTIVE:  The patient was seen today, met with the staff, chart reviewed and

also covering for Dr. Barker.  The patient is still delusional, paranoid, claims

that her son, who is her DPOA, taking all her money.  The patient is also highly

anxious, nervous.



OBSERVATION:

VITAL SIGNS:  Temperature 97.6, blood pressure 129/63, pulse 73, respirations

18, O2 sat 97%.  Slept about 7 hours last night.  The patient's appetite has

improved.



MEDICATIONS:  Reviewed.  Currently on Depakote 625 mg b.i.d., Zoloft 50 mg

daily, olanzapine 2.5 mg q. 2-6 hours p.r.n., Seroquel 25 mg daily,

levothyroxine 75 mcg daily, Aricept 10 mg daily, trazodone 25 mg b.i.d.  The

patient is currently not having any side effects to the medications.



ASSESSMENT:  Major depressive disorder with psychotic features, major

neurocognitive disorder, most likely Alzheimer's.



PLAN:  To continue with the treatment.





______________________________

OLINDA MCKEON MD



DR:  ANN/christiano  JOB#:  [id number] / [id number]

DD:  01/11/2019 16:12  DT:  01/11/2019 21:29

## 2019-01-11 NOTE — PN
DATE:  01/11/2019



SUBJECTIVE:  The patient was seen at the request of the nursing staff as they

felt that she seemed to be congested and worried that she might be fluid

overloaded.  I examined her.  The patient herself said that she feels somewhat

short of breath, cough with scanty whitish sputum.



PHYSICAL EXAMINATION:

GENERAL:  When I examined her, she looked well and was clearly in no apparent

respiratory distress.  No pallor, jaundice, cyanosis, or thyromegaly.  No

jugular venous distension.  No lower limb edema.

VITAL SIGNS:  Her heart rate was 73, blood pressure was 129/63, temperature was

97.6, respiratory rate was 18 and oxygen saturation was 97% on 2 liters of

oxygen by nasal cannula.

HEAD, EYES, EARS, NOSE AND THROAT:  Showed normocephalic, atraumatic.

NECK:  Supple.

HEART:  Showed normal first and second heart sounds with no gallop, rub or

murmur.

CHEST:  Clear to auscultation.  No crepitation or rhonchi.

ABDOMEN:  Distended, soft, nontender.

NEUROLOGIC:  She was awake, alert.  All her cranial nerves are intact.  She

moves extremities without difficulty, though she is mostly chair bound.



IMAGING:  I did order a chest x-ray, which showed that there is a dual lead

left-sided pacemaker is present.  Heart size is normal.  Small hiatal hernia is

present.  No infiltrate, minimal right basilar discoid atelectasis present. 

Mild retrocardiac linear atelectasis or scarring is present.  Vertebroplasty

changes are noted involving the spine.  Right upper quadrant surgical clips are

present.  Advanced degenerative changes of the left glenohumeral joint.



LABORATORY DATA:  We did actually check her labs and her white cell count was

only 6600, hemoglobin 13, hematocrit 39, MCV 92 and platelet count of 173,000. 

Her chemistry showed a serum sodium 140, potassium 4.4, chloride 101,

bicarbonate 35, anion gap of 4, BUN 13, creatinine 0.7.  Estimated GFR was 79 mL

per minute.  Her glucose was 94.  Calcium was 8.3.  Total bilirubin, AST, ALT,

alkaline phosphatase were normal.  Total protein 6.3, albumin 2.6.  Her urine

showed that she was positive for nitrite and there was large amount of bacteria

and more than 40 wbc's.  Her urine culture showed growth of Proteus mirabilis

sensitive to ciprofloxacin and levofloxacin, and therefore, she was started on

levofloxacin 750 mg daily for 10 days.





______________________________

VIVIANE WEAVER MD



DR:  CEDRICK/christiano  JOB#:  [id number] / [id number]

DD:  01/11/2019 13:07  DT:  01/11/2019 19:45

## 2019-01-12 VITALS — SYSTOLIC BLOOD PRESSURE: 103 MMHG | DIASTOLIC BLOOD PRESSURE: 55 MMHG

## 2019-01-12 VITALS — DIASTOLIC BLOOD PRESSURE: 69 MMHG | SYSTOLIC BLOOD PRESSURE: 120 MMHG

## 2019-01-12 RX ADMIN — ALBUTEROL SULFATE SCH MG: 108 AEROSOL, METERED RESPIRATORY (INHALATION) at 09:53

## 2019-01-12 RX ADMIN — ATORVASTATIN CALCIUM SCH MG: 20 TABLET, FILM COATED ORAL at 19:52

## 2019-01-12 RX ADMIN — Medication SCH EA: at 19:52

## 2019-01-12 RX ADMIN — MULTIPLE VITAMINS W/ MINERALS TAB SCH TAB: TAB at 07:40

## 2019-01-12 RX ADMIN — ENOXAPARIN SODIUM SCH MG: 100 INJECTION SUBCUTANEOUS at 07:39

## 2019-01-12 RX ADMIN — DONEPEZIL HYDROCHLORIDE SCH MG: 10 TABLET ORAL at 19:51

## 2019-01-12 RX ADMIN — CARVEDILOL SCH MG: 6.25 TABLET, FILM COATED ORAL at 07:40

## 2019-01-12 RX ADMIN — ASPIRIN SCH MG: 325 TABLET, DELAYED RELEASE ORAL at 07:40

## 2019-01-12 RX ADMIN — Medication SCH CAP: at 19:52

## 2019-01-12 RX ADMIN — ALBUTEROL SULFATE SCH MG: 108 AEROSOL, METERED RESPIRATORY (INHALATION) at 14:59

## 2019-01-12 RX ADMIN — LEVOTHYROXINE SODIUM SCH MCG: 75 TABLET ORAL at 06:22

## 2019-01-12 RX ADMIN — BUDESONIDE SCH MG: 0.5 SUSPENSION RESPIRATORY (INHALATION) at 09:53

## 2019-01-12 RX ADMIN — CARVEDILOL SCH MG: 6.25 TABLET, FILM COATED ORAL at 17:00

## 2019-01-12 RX ADMIN — DIVALPROEX SODIUM SCH MG: 125 CAPSULE, COATED PELLETS ORAL at 19:52

## 2019-01-12 RX ADMIN — LISINOPRIL SCH MG: 10 TABLET ORAL at 07:40

## 2019-01-12 RX ADMIN — Medication SCH CAP: at 07:40

## 2019-01-12 RX ADMIN — PANTOPRAZOLE SODIUM SCH MG: 40 TABLET, DELAYED RELEASE ORAL at 07:40

## 2019-01-12 RX ADMIN — I-VITE, TAB 1000-60-2MG (60/BT) SCH TAB: TAB at 07:43

## 2019-01-12 RX ADMIN — LIDOCAINE SCH PATCH: 50 PATCH CUTANEOUS at 07:39

## 2019-01-12 RX ADMIN — ALBUTEROL SULFATE SCH MG: 108 AEROSOL, METERED RESPIRATORY (INHALATION) at 04:59

## 2019-01-12 RX ADMIN — DIVALPROEX SODIUM SCH MG: 125 CAPSULE, COATED PELLETS ORAL at 07:40

## 2019-01-12 RX ADMIN — BUDESONIDE SCH MG: 0.5 SUSPENSION RESPIRATORY (INHALATION) at 20:16

## 2019-01-12 RX ADMIN — QUETIAPINE FUMARATE SCH MG: 25 TABLET, FILM COATED ORAL at 12:38

## 2019-01-12 RX ADMIN — SERTRALINE SCH MG: 25 TABLET, FILM COATED ORAL at 07:41

## 2019-01-12 RX ADMIN — ALBUTEROL SULFATE SCH MG: 108 AEROSOL, METERED RESPIRATORY (INHALATION) at 20:16

## 2019-01-13 VITALS — DIASTOLIC BLOOD PRESSURE: 70 MMHG | SYSTOLIC BLOOD PRESSURE: 145 MMHG

## 2019-01-13 VITALS — DIASTOLIC BLOOD PRESSURE: 76 MMHG | SYSTOLIC BLOOD PRESSURE: 126 MMHG

## 2019-01-13 RX ADMIN — BUDESONIDE SCH MG: 0.5 SUSPENSION RESPIRATORY (INHALATION) at 20:21

## 2019-01-13 RX ADMIN — MULTIPLE VITAMINS W/ MINERALS TAB SCH TAB: TAB at 08:11

## 2019-01-13 RX ADMIN — ALBUTEROL SULFATE SCH MG: 108 AEROSOL, METERED RESPIRATORY (INHALATION) at 20:21

## 2019-01-13 RX ADMIN — HYDROCODONE BITARTRATE AND ACETAMINOPHEN PRN TAB: 5; 325 TABLET ORAL at 09:18

## 2019-01-13 RX ADMIN — CARVEDILOL SCH MG: 6.25 TABLET, FILM COATED ORAL at 08:12

## 2019-01-13 RX ADMIN — CARVEDILOL SCH MG: 6.25 TABLET, FILM COATED ORAL at 17:58

## 2019-01-13 RX ADMIN — DIVALPROEX SODIUM SCH MG: 125 CAPSULE, COATED PELLETS ORAL at 20:06

## 2019-01-13 RX ADMIN — DONEPEZIL HYDROCHLORIDE SCH MG: 10 TABLET ORAL at 20:07

## 2019-01-13 RX ADMIN — ALBUTEROL SULFATE SCH MG: 108 AEROSOL, METERED RESPIRATORY (INHALATION) at 15:38

## 2019-01-13 RX ADMIN — LIDOCAINE SCH PATCH: 50 PATCH CUTANEOUS at 08:10

## 2019-01-13 RX ADMIN — HYDROCODONE BITARTRATE AND ACETAMINOPHEN PRN TAB: 5; 325 TABLET ORAL at 11:07

## 2019-01-13 RX ADMIN — DIVALPROEX SODIUM SCH MG: 125 CAPSULE, COATED PELLETS ORAL at 08:11

## 2019-01-13 RX ADMIN — ALBUTEROL SULFATE SCH MG: 108 AEROSOL, METERED RESPIRATORY (INHALATION) at 05:12

## 2019-01-13 RX ADMIN — LISINOPRIL SCH MG: 10 TABLET ORAL at 08:12

## 2019-01-13 RX ADMIN — QUETIAPINE FUMARATE SCH MG: 25 TABLET, FILM COATED ORAL at 13:28

## 2019-01-13 RX ADMIN — ASPIRIN SCH MG: 325 TABLET, DELAYED RELEASE ORAL at 08:11

## 2019-01-13 RX ADMIN — Medication SCH CAP: at 08:11

## 2019-01-13 RX ADMIN — ALBUTEROL SULFATE SCH MG: 108 AEROSOL, METERED RESPIRATORY (INHALATION) at 10:22

## 2019-01-13 RX ADMIN — SERTRALINE SCH MG: 25 TABLET, FILM COATED ORAL at 08:12

## 2019-01-13 RX ADMIN — Medication SCH CAP: at 20:07

## 2019-01-13 RX ADMIN — GABAPENTIN SCH MG: 100 CAPSULE ORAL at 20:08

## 2019-01-13 RX ADMIN — I-VITE, TAB 1000-60-2MG (60/BT) SCH TAB: TAB at 08:11

## 2019-01-13 RX ADMIN — ATORVASTATIN CALCIUM SCH MG: 20 TABLET, FILM COATED ORAL at 20:07

## 2019-01-13 RX ADMIN — Medication SCH EA: at 20:10

## 2019-01-13 RX ADMIN — PANTOPRAZOLE SODIUM SCH MG: 40 TABLET, DELAYED RELEASE ORAL at 08:11

## 2019-01-13 RX ADMIN — LEVOTHYROXINE SODIUM SCH MCG: 75 TABLET ORAL at 05:54

## 2019-01-13 RX ADMIN — BUDESONIDE SCH MG: 0.5 SUSPENSION RESPIRATORY (INHALATION) at 10:21

## 2019-01-13 RX ADMIN — ENOXAPARIN SODIUM SCH MG: 100 INJECTION SUBCUTANEOUS at 08:11

## 2019-01-13 NOTE — PN
DATE:  01/13/2019



SUBJECTIVE:  The patient was seen today, met with the staff, chart reviewed. 

The patient still has some pressured speech, racing thoughts.  Stays in bed most

of the time, not able to verbalize her needs.  Denies of any suicidal thoughts. 

Staff reports no major behavior problems.  No falls.



OBSERVATION:  Temperature 97.7, blood pressure 145/70, pulse 68, respiration 14,

O2 sat 99%.  Slept about 6 hours last night.



The patient's medications reviewed.  Currently on Depakote, Zoloft, olanzapine

as p.r.n.  Also on Seroquel, Aricept, and trazodone.



ASSESSMENT:

1.  Major depressive disorder with psychotic features.

2.  Major neurocognitive disorder, most likely Alzheimer's.



PLAN:  Continue with the treatment.





______________________________

OLINDA MCKEON MD



DR:  ANN/christiano  JOB#:  [id number] / [id number]

DD:  01/13/2019 12:09  DT:  01/13/2019 15:52

## 2019-01-14 VITALS — DIASTOLIC BLOOD PRESSURE: 68 MMHG | SYSTOLIC BLOOD PRESSURE: 135 MMHG

## 2019-01-14 VITALS — DIASTOLIC BLOOD PRESSURE: 58 MMHG | SYSTOLIC BLOOD PRESSURE: 134 MMHG

## 2019-01-14 RX ADMIN — MULTIPLE VITAMINS W/ MINERALS TAB SCH TAB: TAB at 08:43

## 2019-01-14 RX ADMIN — CARVEDILOL SCH MG: 6.25 TABLET, FILM COATED ORAL at 18:03

## 2019-01-14 RX ADMIN — GABAPENTIN SCH MG: 100 CAPSULE ORAL at 13:36

## 2019-01-14 RX ADMIN — GABAPENTIN SCH MG: 100 CAPSULE ORAL at 20:19

## 2019-01-14 RX ADMIN — HYDROCODONE BITARTRATE AND ACETAMINOPHEN PRN TAB: 5; 325 TABLET ORAL at 11:30

## 2019-01-14 RX ADMIN — ALBUTEROL SULFATE SCH MG: 108 AEROSOL, METERED RESPIRATORY (INHALATION) at 15:31

## 2019-01-14 RX ADMIN — SERTRALINE SCH MG: 25 TABLET, FILM COATED ORAL at 08:48

## 2019-01-14 RX ADMIN — I-VITE, TAB 1000-60-2MG (60/BT) SCH TAB: TAB at 08:43

## 2019-01-14 RX ADMIN — LEVOTHYROXINE SODIUM SCH MCG: 75 TABLET ORAL at 06:13

## 2019-01-14 RX ADMIN — BUDESONIDE SCH MG: 0.5 SUSPENSION RESPIRATORY (INHALATION) at 20:00

## 2019-01-14 RX ADMIN — ASPIRIN SCH MG: 325 TABLET, DELAYED RELEASE ORAL at 08:43

## 2019-01-14 RX ADMIN — LIDOCAINE SCH PATCH: 50 PATCH CUTANEOUS at 08:46

## 2019-01-14 RX ADMIN — Medication SCH CAP: at 20:18

## 2019-01-14 RX ADMIN — CARVEDILOL SCH MG: 6.25 TABLET, FILM COATED ORAL at 08:44

## 2019-01-14 RX ADMIN — PANTOPRAZOLE SODIUM SCH MG: 40 TABLET, DELAYED RELEASE ORAL at 07:46

## 2019-01-14 RX ADMIN — DIVALPROEX SODIUM SCH MG: 125 CAPSULE, COATED PELLETS ORAL at 20:18

## 2019-01-14 RX ADMIN — ALBUTEROL SULFATE SCH MG: 108 AEROSOL, METERED RESPIRATORY (INHALATION) at 05:11

## 2019-01-14 RX ADMIN — DONEPEZIL HYDROCHLORIDE SCH MG: 10 TABLET ORAL at 20:18

## 2019-01-14 RX ADMIN — ATORVASTATIN CALCIUM SCH MG: 20 TABLET, FILM COATED ORAL at 20:18

## 2019-01-14 RX ADMIN — ALBUTEROL SULFATE SCH MG: 108 AEROSOL, METERED RESPIRATORY (INHALATION) at 20:00

## 2019-01-14 RX ADMIN — LISINOPRIL SCH MG: 10 TABLET ORAL at 08:44

## 2019-01-14 RX ADMIN — GABAPENTIN SCH MG: 100 CAPSULE ORAL at 08:44

## 2019-01-14 RX ADMIN — DIVALPROEX SODIUM SCH MG: 125 CAPSULE, COATED PELLETS ORAL at 08:43

## 2019-01-14 RX ADMIN — ENOXAPARIN SODIUM SCH MG: 100 INJECTION SUBCUTANEOUS at 08:45

## 2019-01-14 RX ADMIN — ALBUTEROL SULFATE SCH MG: 108 AEROSOL, METERED RESPIRATORY (INHALATION) at 11:00

## 2019-01-14 RX ADMIN — Medication SCH CAP: at 08:43

## 2019-01-14 RX ADMIN — QUETIAPINE FUMARATE SCH MG: 25 TABLET, FILM COATED ORAL at 13:36

## 2019-01-14 RX ADMIN — BUDESONIDE SCH MG: 0.5 SUSPENSION RESPIRATORY (INHALATION) at 08:00

## 2019-01-14 RX ADMIN — Medication SCH EA: at 20:21

## 2019-01-15 VITALS — SYSTOLIC BLOOD PRESSURE: 162 MMHG | DIASTOLIC BLOOD PRESSURE: 83 MMHG

## 2019-01-15 RX ADMIN — BUDESONIDE SCH MG: 0.5 SUSPENSION RESPIRATORY (INHALATION) at 09:41

## 2019-01-15 RX ADMIN — SERTRALINE SCH MG: 25 TABLET, FILM COATED ORAL at 08:43

## 2019-01-15 RX ADMIN — LIDOCAINE SCH PATCH: 50 PATCH CUTANEOUS at 08:46

## 2019-01-15 RX ADMIN — DIVALPROEX SODIUM SCH MG: 125 CAPSULE, COATED PELLETS ORAL at 08:43

## 2019-01-15 RX ADMIN — ALBUTEROL SULFATE SCH MG: 108 AEROSOL, METERED RESPIRATORY (INHALATION) at 09:41

## 2019-01-15 RX ADMIN — QUETIAPINE FUMARATE SCH MG: 25 TABLET, FILM COATED ORAL at 12:23

## 2019-01-15 RX ADMIN — Medication SCH CAP: at 08:44

## 2019-01-15 RX ADMIN — ASPIRIN SCH MG: 325 TABLET, DELAYED RELEASE ORAL at 08:45

## 2019-01-15 RX ADMIN — ENOXAPARIN SODIUM SCH MG: 100 INJECTION SUBCUTANEOUS at 08:45

## 2019-01-15 RX ADMIN — I-VITE, TAB 1000-60-2MG (60/BT) SCH TAB: TAB at 08:43

## 2019-01-15 RX ADMIN — CARVEDILOL SCH MG: 6.25 TABLET, FILM COATED ORAL at 08:44

## 2019-01-15 RX ADMIN — ALBUTEROL SULFATE SCH MG: 108 AEROSOL, METERED RESPIRATORY (INHALATION) at 05:07

## 2019-01-15 RX ADMIN — HYDROCODONE BITARTRATE AND ACETAMINOPHEN PRN TAB: 5; 325 TABLET ORAL at 08:48

## 2019-01-15 RX ADMIN — PANTOPRAZOLE SODIUM SCH MG: 40 TABLET, DELAYED RELEASE ORAL at 07:34

## 2019-01-15 RX ADMIN — LISINOPRIL SCH MG: 10 TABLET ORAL at 08:44

## 2019-01-15 RX ADMIN — MULTIPLE VITAMINS W/ MINERALS TAB SCH TAB: TAB at 08:45

## 2019-01-15 RX ADMIN — GABAPENTIN SCH MG: 100 CAPSULE ORAL at 13:00

## 2019-01-15 RX ADMIN — GABAPENTIN SCH MG: 100 CAPSULE ORAL at 08:45

## 2019-01-15 RX ADMIN — LEVOTHYROXINE SODIUM SCH MCG: 75 TABLET ORAL at 05:59

## 2019-01-15 NOTE — PDOC
Exam


Note:


Marquise Note:


Late entry for DOS 01/14/2019. Please also refer to the separate dictated note~

for this date of service dictated separately.~Patient seen individually. 

Discussed the patient with Nursing staff reviewed the chart.~Reviewed interim 

history and current functioning. Reviewed vital signs,~Labs/ Radiology~and 

current medications noted below. Continue current treatment with the changes 

noted in the dictated addendum note





Assessment:


Vital Signs:





 VS - Last 72 Hours, by Label








  Date Time  Temp Pulse Resp B/P (MAP) Pulse Ox O2 Delivery O2 Flow Rate FiO2


 


1/15/19 08:48     97 Room Air  


 


1/15/19 08:44  70  162/83    


 


1/15/19 08:44  70  162/83    


 


1/15/19 05:50 98.2 70 19 162/83 (109) 97   


 


1/15/19 05:00     96 Nasal Cannula 2.0 


 


1/14/19 19:50     97 Nasal Cannula 2.0 


 


1/14/19 19:42     97 Nasal Cannula 2.0 


 


1/14/19 19:40     87 Room Air  


 


1/14/19 18:03  61  135/68    


 


1/14/19 16:32 97.4 61 16 135/68 (90) 97   


 


1/14/19 15:32     96 Nasal Cannula 2.0 


 


1/14/19 13:33     95 Room Air  


 


1/14/19 11:30     95 Room Air  


 


1/14/19 08:44  60  134/58    


 


1/14/19 08:44  60  134/58    


 


1/14/19 05:57 97.4 60 16 134/58 (83) 95   


 


1/14/19 05:10     96 Nasal Cannula 2.0 


 


1/13/19 20:25     97 Nasal Cannula 2.0 


 


1/13/19 17:58  65  126/76    


 


1/13/19 15:58 97.3 65 18 126/76 (93) 97  2.0 


 


1/13/19 15:39     94 Nasal Cannula 2.0 


 


1/13/19 10:28     95 Nasal Cannula 2.0 


 


1/13/19 10:23     95 Nasal Cannula 2.0 


 


1/13/19 08:12  68  145/70    


 


1/13/19 08:12  68  145/70    


 


1/13/19 06:06 97.7 68 14 145/70 (95) 97   


 


1/13/19 05:14     96 Nasal Cannula 2.0 


 


1/12/19 20:15     96 Nasal Cannula 2.0 


 


1/12/19 17:00  68  103/55    


 


1/12/19 15:47 97.7 68 22 103/55 (71) 97 Room Air  


 


1/12/19 14:59     96 Nasal Cannula 2.0 


 


1/12/19 09:54     97 Nasal Cannula 2.0 








 Vital Signs








  Date Time  Temp Pulse Resp B/P (MAP) Pulse Ox O2 Delivery O2 Flow Rate FiO2


 


1/15/19 08:48     97 Room Air  


 


1/15/19 08:44  70  162/83    


 


1/15/19 05:50 98.2  19     


 


1/15/19 05:00       2.0 








I&O











Intake and Output 


 


 1/15/19





 07:01


 


Intake Total 1080 ml


 


Balance 1080 ml


 


 


 


Intake Oral 1080 ml











Current Medications:


Meds:





Current Medications


Multi-Ingredient Ointment (Analgesic Balm) 1 nieves PRN QID  PRN TP MUSCLE PAIN;  

Start 12/21/18 at 18:45


Al Hydroxide/Mg Hydroxide (Mylanta Plus Xs) 15 ml PRN AFTMEALHC  PRN PO 

DYSPEPSIA;  Start 12/21/18 at 18:45


Magnesium Hydroxide (Milk Of Magnesia) 2,400 mg PRN QHS  PRN PO CONSTIPATION;  

Start 12/21/18 at 18:45


Aspirin (Aspirin Enteric Coated) 325 mg DAILY PO  Last administered on 1/15/

19at 08:45;  Start 12/22/18 at 09:00


Atorvastatin Calcium (Lipitor) 20 mg QHS PO  Last administered on 1/14/19at 20:

18;  Start 12/21/18 at 21:00


Estrogens Conjugated (Premarin) 0.5 nieves PRN DAILY  PRN VG vaginal dryness;  

Start 12/21/18 at 19:15


Acetaminophen/ Hydrocodone Bitart (Lortab 5/325) 1 tab PRN Q12HR  PRN PO PAIN 

Last administered on 12/24/18at 09:06;  Start 12/21/18 at 19:15;  Stop 12/24/18 

at 15:02;  Status DC


Ipratropium Bromide (Atrovent) 0.2 mg PRN Q6HRS  PRN IH SHORTNESS OF BREATH;  

Start 12/21/18 at 19:15


Levothyroxine Sodium (Synthroid) 75 mcg DAILYAC PO  Last administered on 12/22/ 18at 08:40;  Start 12/22/18 at 07:30;  Stop 12/22/18 at 12:26;  Status DC


Lisinopril (Prinivil) 10 mg DAILY PO  Last administered on 1/15/19at 08:44;  

Start 12/22/18 at 09:00


Acetaminophen (Tylenol) 650 mg PRN Q6HRS  PRN PO PAIN / TEMP Last administered 

on 1/4/19at 06:01;  Start 12/21/18 at 19:45


Non-Formulary Medication (Albuterol Sulfate (Albuterol Sulfate Conc Neb Soln)) 

2.5 mg PRN Q6HRS  PRN NEB SHORTNESS OF BREATH;  Start 12/21/18 at 19:15;  

Status UNV


Amlodipine Besylate (Norvasc) 10 mg DAILY PO  Last administered on 12/25/18at 08

:38;  Start 12/22/18 at 09:00;  Stop 12/25/18 at 12:27;  Status DC


Non-Formulary Medication (Budesonide/ Formoterol Fumarate (Symbicort 160-4.5 

Mcg Inhaler)) 2 puff BID IH ;  Start 12/21/18 at 21:00;  Status UNV


Carvedilol (Coreg) 6.25 mg BIDWMEALS PO  Last administered on 1/15/19at 08:44;  

Start 12/21/18 at 20:00


Divalproex Sodium (Depakote Er) 750 mg QHS PO  Last administered on 12/21/18at 

20:53;  Start 12/21/18 at 21:00;  Stop 12/22/18 at 21:13;  Status DC


Docusate Sodium (Colace) 100 mg PRN DAILY  PRN PO HARD STOOLS;  Start 12/21/18 

at 19:45


Donepezil HCl (Aricept) 10 mg QHS PO  Last administered on 1/14/19at 20:18;  

Start 12/21/18 at 21:00


Enoxaparin Sodium (Lovenox 40mg Syringe) 40 mg DAILY SQ  Last administered on 1/

15/19at 08:45;  Start 12/22/18 at 09:00


Hydralazine HCl (Apresoline) 10 mg TID PO  Last administered on 12/25/18at 08:39

;  Start 12/21/18 at 21:00;  Stop 12/25/18 at 12:27;  Status DC


Multivitamins/ Calcium (Thera-M Plus) 1 tab DAILY PO  Last administered on 1/15/

19at 08:45;  Start 12/22/18 at 09:00


Olanzapine (ZyPREXA) 5 mg PRN DAILY  PRN PO ANXIETY/AGITATION Last administered 

on 1/14/19at 11:25;  Start 12/22/18 at 09:00


Pantoprazole Sodium (Protonix) 40 mg DAILYAC PO  Last administered on 1/15/19at 

07:34;  Start 12/22/18 at 07:30


Phenazopyridine HCl (Pyridium) 100 mg PRN TID  PRN PO URINARY PAIN;  Start 12/21 /18 at 19:45


Polyethylene Glycol (miraLAX) 17 gm PRN BID  PRN PO CONSTIPATION;  Start 12/22/ 18 at 09:00


Sodium Chloride (Saline Mist Nasal) 1 nieves QID NS ;  Start 12/21/18 at 21:00;  

Status Cancel


Trazodone HCl (Desyrel) 25 mg PRN BID  PRN PO ANXIETY/AGITATION;  Start 12/21/ 18 at 19:45


Trazodone HCl (Desyrel) 50 mg PRN QHS  PRN PO INSOMNIA;  Start 12/21/18 at 19:45


Lidocaine (Lidoderm) 1 patch DAILY TD  Last administered on 1/15/19at 08:46;  

Start 12/22/18 at 09:00


Multivitamins/ Minerals (I-Irina) 1 tab DAILY PO  Last administered on 1/15/19at 

08:43;  Start 12/22/18 at 09:00


Miscellaneous (Lidoderm Patch Removal) 1 ea QHS MC  Last administered on 1/14/ 19at 20:21;  Start 12/21/18 at 21:00


Albuterol Sulfate (Ventolin) 2.5 mg PRN Q6HRS  PRN NEB SHORTNESS OF BREATH Last 

administered on 1/11/19at 20:13;  Start 12/21/18 at 19:45


Albuterol Sulfate (Ventolin) 2.5 mg RTQID NEB  Last administered on 1/15/19at 05

:07;  Start 12/21/18 at 20:00


Budesonide (Pulmicort) 0.5 mg RTBID NEB  Last administered on 1/14/19at 20:00;  

Start 12/21/18 at 20:00


Sodium Chloride (Ayr Saline Nasal) 2 nieves QID NS ;  Start 12/21/18 at 21:00;  

Stop 12/22/18 at 13:08;  Status DC


Levothyroxine Sodium (Synthroid) 75 mcg DAILY06 PO  Last administered on 1/15/

19at 05:59;  Start 12/23/18 at 06:00


Sodium Chloride (Saline Mist Nasal) 1 nieves PRN QID  PRN NS NASAL CONGESTION;  

Start 12/22/18 at 13:15


Divalproex Sodium (Depakote Sprinkles) 750 mg HS PO  Last administered on 12/23/ 18at 20:20;  Start 12/22/18 at 21:30;  Stop 12/24/18 at 14:27;  Status DC


Sertraline HCl (Zoloft) 25 mg DAILY PO  Last administered on 12/25/18at 08:39;  

Start 12/23/18 at 09:00;  Stop 12/25/18 at 09:01;  Status DC


Sertraline HCl (Zoloft) 50 mg DAILY PO  Last administered on 1/13/19at 08:12;  

Start 12/26/18 at 09:00;  Stop 1/13/19 at 13:49;  Status DC


Divalproex Sodium (Depakote Sprinkles) 500 mg BID94 PO  Last administered on 12/ 24/18at 07:55;  Start 12/24/18 at 09:00;  Stop 12/24/18 at 14:27;  Status DC


Quetiapine Fumarate (SEROquel) 12.5 mg 1300 PO  Last administered on 1/14/19at 

13:36;  Start 12/24/18 at 13:00


Divalproex Sodium (Depakote Sprinkles) 500 mg BID PO  Last administered on 12/26 /18at 09:59;  Start 12/24/18 at 21:00;  Stop 12/26/18 at 17:43;  Status DC


Acetaminophen/ Hydrocodone Bitart (Lortab 5/325) 1 tab PRN Q6HRS  PRN PO PAIN 

Last administered on 1/15/19at 08:48;  Start 12/24/18 at 15:15


Olanzapine (ZyPREXA ZYDIS) 2.5 mg PRN Q2HR  PRN PO PSYCHOSIS Last administered 

on 1/11/19at 19:46;  Start 12/24/18 at 19:15;  Stop 1/14/19 at 17:07;  Status DC


Divalproex Sodium (Depakote Sprinkles) 625 mg BID PO  Last administered on 1/15/

19at 08:43;  Start 12/26/18 at 21:00


Ondansetron HCl (Zofran Odt) 4 mg PRN Q8HRS  PRN PO NAUSEA/VOMITING Last 

administered on 1/11/19at 19:39;  Start 1/3/19 at 08:30


Levofloxacin (Levaquin) 750 mg DAILY06 PO  Last administered on 1/15/19at 05:59

;  Start 1/10/19 at 19:45


Lactobacillus Rhamnosus (Culturelle) 1 cap BID PO  Last administered on 1/15/

19at 08:44;  Start 1/10/19 at 21:00


Gabapentin (Neurontin) 100 mg TID PO  Last administered on 1/15/19at 08:45;  

Start 1/13/19 at 21:00


Sertraline HCl (Zoloft) 75 mg DAILY PO  Last administered on 1/15/19at 08:43;  

Start 1/14/19 at 09:00





Active Scripts


Active


Reported


Levofloxacin 750 Mg Tablet 750 Mg PO DAILY06


Zoloft (Sertraline Hcl) 25 Mg Tablet 75 Mg PO DAILY


Quetiapine Fumarate 25 Mg Tablet 12.5 Mg PO DAILY13


Zofran (Ondansetron Hcl) 4 Mg Tablet 4 Mg PO PRN Q8HRS PRN


Analgesic Balm (Methyl Salicylate/Menthol) 28 Gm Oint...g. 1 Nieves TP PRN QID PRN


Milk Of Magnesia (Magnesium Hydroxide) 2,400 Mg/10 Ml Oral.susp 2,400 Mg PO PRN 

QHS PRN


Mag-Al Plus Xs Suspension (Mag Hydrox/Al Hydrox/Simeth) 30 Ml Oral.susp 15 Ml 

PO PRN AFTMEALHC PRN


Culturelle (Lactobacillus Rhamnosus Gg) 1 Each Capsule 1 Each PO BID


Gabapentin ** (Gabapentin) 100 Mg Capsule 100 Mg PO TID


Albuterol Sulfate Neb Soln  ** (Albuterol Sulfate) 2.5 Mg/3 Ml Vial.neb 2.5 Mg 

NEB RTQID


Depakote Sprinkle (Divalproex Sodium) 125 Mg Cap.sprink 625 Mg PO BID


[occuvite]   1 Tab PO DAILY


Pyridium (Phenazopyridine Hcl) 100 Mg Tablet 100 Mg PO PRN TID PRN


Trazodone Hcl 50 Mg Tablet 25 Mg PO PRN QHS PRN


Premarin (Estrogens, Conjugated) 30 Gm Cream.appl 0.5 Gm VG PRN DAILY PRN


Multivitamins (Multivitamin) 1 Each Tablet 1 Tab PO DAILY


Trazodone Hcl 50 Mg Tablet 25 Mg PO BID PRN


Ayr Saline (Sodium Chloride) 50 Ml Drops 2 Drop NS QID


Miralax (Polyethylene Glycol 3350) 17 Gm Powd.pack 17 Gm PO PRN BID PRN


Protonix (Pantoprazole Sodium) 40 Mg Tablet.dr 40 Mg PO DAILY


Zyprexa (Olanzapine) 5 Mg Tablet 5 Mg PO PRN DAILY PRN


Lisinopril 10 Mg Tablet 10 Mg PO DAILY


[lidoderm]   1 Patch TD DAILY


Levothyroxine Sodium 75 Mcg Tablet 75 Mcg PO DAILYAC


Ipratropium Bromide 0.2 Mg/1 Ml Solution 0.2 Mg IH PRN Q6HRS PRN


Hydrocodone-Apap 5-325  ** (Hydrocodone Bit/Acetaminophen) 1 Each Tablet 1 Tab 

PO PRN Q12HR PRN


Hydralazine Hcl 10 Mg Tablet 10 Mg PO TID


Lovenox (Enoxaparin Sodium) 40 Mg/0.4 Ml Disp.syrin 40 Mg SQ DAILY


Donepezil Hcl 10 Mg Tablet 10 Mg PO HS


Colace (Docusate Sodium) 100 Mg Capsule 100 Mg PO PRN DAILY PRN


Depakote Er (Divalproex Sodium) 500 Mg Tab.er.24h 750 Mg PO HS


Coreg  ** (Carvedilol) 6.25 Mg Tablet 6.25 Mg PO BIDWMEALS


Symbicort 160-4.5 Mcg Inhaler (Budesonide/Formoterol Fumarate) 10.2 Gm 

Hfa.aer.ad 2 Puff IH BID


Lipitor (Atorvastatin Calcium) 20 Mg Tablet 20 Mg PO QHS


Aspirin Ec (Aspirin) 325 Mg Tablet. 325 Mg PO DAILY


Amlodipine Besylate 10 Mg Tablet 10 Mg PO DAILY


Albuterol Sulfate Conc Neb Soln (Albuterol Sulfate) 2.5 Mg/0.5 Ml Vial.neb 2.5 

Mg NEB PRN Q6HRS PRN


Acetaminophen 650 Mg/20.3 Ml Solution 650 Mg PO PRN Q6HRS PRN


I have reviewed the current psychotropics carefully including drug 

interactions.  Risk benefit ratio favors no change other than as noted in my 

dictated progress note.





Diagnosis:


Problems:  


(1) Dementia with behavioral disturbance


(2) Anxiety disorder


(3) Major depressive disorder, recurrent episode


(4) Psychosis, atypical


(5) Impulse control disorder











JULY ZAMUDIO MD Pradeep 15, 2019 09:15

## 2019-01-16 NOTE — PN
DATE:  01/14/2019



PSYCHIATRIC PROGRESS NOTE



This late entry 01/14/2019 covers the elements not covered in my initial note.



SUBJECTIVE:  I met with the patient in the evening.  The patient slept 8-1/2

hours previous night.  She is oriented to herself, place, and year.  She

attended groups with ____, social service staff and then voiced some depressive

symptoms and not wanting to live because her family does not care for her.  When

I questioned her closely on this in the evening, she denied suicidal ideation. 

She does have a UTI and is on Levaquin for this.  [facility] is

unable to give her PRNs atypical antipsychotics and we will stop the Zyprexa

p.r.n.



REVIEW OF SYSTEMS:  Ambulation impaired, in wheelchair, some shortness of breath

on O2 supplements.  No CV, GI, , eye system symptoms on review.



MENTAL STATUS EXAM:  Oriented to herself and situation.  Speech is coherent, has

some latency.  Abstraction fair, computation impaired, language function intact,

attention span short.  Mood and affect less depressed.  No suicidal ideation.



LABORATORY DATA:  Reviewed.



IMPRESSION:  Major depressive disorder, recurrent, in partial remission; bipolar

1 disorder, unspecified; cognitive disorder, unspecified.



PLAN:  No change from initial note other than what is noted above.





______________________________

JULY ZAMUDIO MD



DR:  GINNA/christiano  JOB#:  [id number] / [id number]

DD:  01/15/2019 17:11  DT:  01/15/2019 20:57

## 2019-01-16 NOTE — DS
DATE OF DISCHARGE:  01/15/2019



DISCHARGE SUMMARY/PSYCHIATRIC PROGRESS NOTE



This late entry, date of service, 01/15/2018, covers elements not covered in my

initial note.



REASON FOR ADMISSION:  Please refer to the admission history for details. 

Briefly, the patient is an 87-year-old  female, referred to us from

[facility] where she had been an inpatient for an

extended period of time.  She is admitted by her son who had been appointed her

guardian by the court.  She had been reportedly increasingly agitated, cursing,

yelling out, trying to wrap the call light around her neck.  She is a retired

nurse and was felt to be progressively more confused, psychotic and referred for

inpatient psychiatric stabilization.  Previously, she had been living for a

short period of time with her son and his significant other, but she resented

the home environment, multiple pets in the home and other disruptions which she

used as an explanation for her behaviors, prompting the initial referral to . 

Family shared that she had a long history of bipolar disorder, but we were

unable to obtain any relevant records from the past for this other than the [facility]

records from the very recent hospitalization.



SIGNIFICANT FINDINGS AND CLINICAL COURSE:  Following admission, the patient was

seen daily individually by myself from a psychiatric standpoint, medical

followup with Dr. Varela.  She is hard of hearing.  Ambulation was impaired in

wheelchair and she had some shortness of breath, was on O2 supplements.  She did

have some short term memory deficits, appear depressed, paranoid.  Adjustments

were made in her psychotropics and she seemed to respond to a combination of

trazodone 25 mg b.i.d. p.r.n. for anxiety, 50 mg at bedtime p.r.n. insomnia;

Aricept 10 mg a day, Zoloft 75 mg a day, Depakote Sprinkles 625 mg b.i.d. with a

Valproic acid level therapeutic at 54, Seroquel 12.5 mg daily at 1300,

gabapentin 100 mg 3 times a day, and Zyprexa p.r.n.



REVIEW OF SYSTEMS:  Prior to discharge of 01/15/2019, ambulation impaired, in

wheelchair, shortness of breath, on O2 supplements.  No CV, , GI, eye system

symptoms on review.



MENTAL STATUS EXAM:  Oriented to herself and situation.  Speech has some

latency, coherent.  Abstraction fair, computation impaired, language function

intact, attention span short.  Mood and affect was improved.  No suicidal

ideation at discharge.



FINAL DIAGNOSES:  Major depressive disorder, recurrent, in partial remission;

possible or probable bipolar 1 disorder, depressed, cognitive disorder,

unspecified versus major neurocognitive disorder, early Alzheimer, vascular with

depression; anxiety disorder, unspecified, rest  unchanged from admission.



DISCHARGE MEDICATIONS:  Please refer to the MRAD.



DISCHARGE INSTRUCTIONS:  Outpatient psychiatric and medical followup at [facility] where I will follow her from a psychiatric standpoint.



Time for discharge day management greater than 30 minutes.





______________________________

MAN ARLENE ZAMUDIO MD



DR:  GINNA/christiano  JOB#:  [id number] / [id number]

DD:  01/16/2019 09:29  DT:  01/16/2019 13:02

## 2019-01-16 NOTE — PDOC
Exam


Note:


Marquise Note:


Late entry for DOD 01/15/2019. Please also refer to the separate dictated note~

for this date of service dictated separately.~Patient seen individually. 

Discussed the patient with Nursing staff reviewed the chart.~Reviewed interim 

history and current functioning. Reviewed vital signs,~Labs/ Radiology~and 

current medications noted below. Continue current treatment with the changes 

noted in the dictated addendum note





Assessment:


Vital Signs:





 VS - Last 72 Hours, by Label








  Date Time  Temp Pulse Resp B/P (MAP) Pulse Ox O2 Delivery O2 Flow Rate FiO2


 


1/15/19 10:24     98 Room Air  


 


1/15/19 09:43     98 Nasal Cannula 2.0 


 


1/15/19 08:48     97 Room Air  


 


1/15/19 08:44  70  162/83    


 


1/15/19 08:44  70  162/83    


 


1/15/19 05:50 98.2 70 19 162/83 (109) 97   


 


1/15/19 05:00     96 Nasal Cannula 2.0 


 


1/14/19 19:50     97 Nasal Cannula 2.0 


 


1/14/19 19:42     97 Nasal Cannula 2.0 


 


1/14/19 19:40     87 Room Air  


 


1/14/19 18:03  61  135/68    


 


1/14/19 16:32 97.4 61 16 135/68 (90) 97   


 


1/14/19 15:32     96 Nasal Cannula 2.0 


 


1/14/19 11:30     95 Room Air  


 


1/14/19 08:44  60  134/58    


 


1/14/19 08:44  60  134/58    


 


1/14/19 05:57 97.4 60 16 134/58 (83) 95   


 


1/14/19 05:10     96 Nasal Cannula 2.0 


 


1/13/19 20:25     97 Nasal Cannula 2.0 


 


1/13/19 17:58  65  126/76    


 


1/13/19 15:58 97.3 65 18 126/76 (93) 97  2.0 


 


1/13/19 15:39     94 Nasal Cannula 2.0 








 Vital Signs








  Date Time  Temp Pulse Resp B/P (MAP) Pulse Ox O2 Delivery O2 Flow Rate FiO2


 


1/15/19 10:24     98 Room Air  


 


1/15/19 09:43       2.0 


 


1/15/19 08:44  70  162/83    


 


1/15/19 05:50 98.2  19     








I&O











Intake and Output 


 


 1/16/19





 07:01


 


Intake Total 960 ml


 


Balance 960 ml


 


 


 


Intake Oral 960 ml


 


# Bowel Movements 2











Current Medications:


Meds:





Current Medications


Multi-Ingredient Ointment (Analgesic Balm) 1 nieves PRN QID  PRN TP MUSCLE PAIN;  

Start 12/21/18 at 18:45;  Stop 1/15/19 at 15:14;  Status DC


Al Hydroxide/Mg Hydroxide (Mylanta Plus Xs) 15 ml PRN AFTMEALHC  PRN PO 

DYSPEPSIA;  Start 12/21/18 at 18:45;  Stop 1/15/19 at 15:14;  Status DC


Magnesium Hydroxide (Milk Of Magnesia) 2,400 mg PRN QHS  PRN PO CONSTIPATION;  

Start 12/21/18 at 18:45;  Stop 1/15/19 at 15:14;  Status DC


Aspirin (Aspirin Enteric Coated) 325 mg DAILY PO  Last administered on 1/15/

19at 08:45;  Start 12/22/18 at 09:00;  Stop 1/15/19 at 15:14;  Status DC


Atorvastatin Calcium (Lipitor) 20 mg QHS PO  Last administered on 1/14/19at 20:

18;  Start 12/21/18 at 21:00;  Stop 1/15/19 at 15:14;  Status DC


Estrogens Conjugated (Premarin) 0.5 nieves PRN DAILY  PRN VG vaginal dryness;  

Start 12/21/18 at 19:15;  Stop 1/15/19 at 15:14;  Status DC


Acetaminophen/ Hydrocodone Bitart (Lortab 5/325) 1 tab PRN Q12HR  PRN PO PAIN 

Last administered on 12/24/18at 09:06;  Start 12/21/18 at 19:15;  Stop 12/24/18 

at 15:02;  Status DC


Ipratropium Bromide (Atrovent) 0.2 mg PRN Q6HRS  PRN IH SHORTNESS OF BREATH;  

Start 12/21/18 at 19:15;  Stop 1/15/19 at 15:14;  Status DC


Levothyroxine Sodium (Synthroid) 75 mcg DAILYAC PO  Last administered on 12/22/ 18at 08:40;  Start 12/22/18 at 07:30;  Stop 12/22/18 at 12:26;  Status DC


Lisinopril (Prinivil) 10 mg DAILY PO  Last administered on 1/15/19at 08:44;  

Start 12/22/18 at 09:00;  Stop 1/15/19 at 15:14;  Status DC


Acetaminophen (Tylenol) 650 mg PRN Q6HRS  PRN PO PAIN / TEMP Last administered 

on 1/4/19at 06:01;  Start 12/21/18 at 19:45;  Stop 1/15/19 at 15:14;  Status DC


Non-Formulary Medication (Albuterol Sulfate (Albuterol Sulfate Conc Neb Soln)) 

2.5 mg PRN Q6HRS  PRN NEB SHORTNESS OF BREATH;  Start 12/21/18 at 19:15;  

Status UNV


Amlodipine Besylate (Norvasc) 10 mg DAILY PO  Last administered on 12/25/18at 08

:38;  Start 12/22/18 at 09:00;  Stop 12/25/18 at 12:27;  Status DC


Non-Formulary Medication (Budesonide/ Formoterol Fumarate (Symbicort 160-4.5 

Mcg Inhaler)) 2 puff BID IH ;  Start 12/21/18 at 21:00;  Status UNV


Carvedilol (Coreg) 6.25 mg BIDWMEALS PO  Last administered on 1/15/19at 08:44;  

Start 12/21/18 at 20:00;  Stop 1/15/19 at 15:14;  Status DC


Divalproex Sodium (Depakote Er) 750 mg QHS PO  Last administered on 12/21/18at 

20:53;  Start 12/21/18 at 21:00;  Stop 12/22/18 at 21:13;  Status DC


Docusate Sodium (Colace) 100 mg PRN DAILY  PRN PO HARD STOOLS;  Start 12/21/18 

at 19:45;  Stop 1/15/19 at 15:14;  Status DC


Donepezil HCl (Aricept) 10 mg QHS PO  Last administered on 1/14/19at 20:18;  

Start 12/21/18 at 21:00;  Stop 1/15/19 at 15:14;  Status DC


Enoxaparin Sodium (Lovenox 40mg Syringe) 40 mg DAILY SQ  Last administered on 1/

15/19at 08:45;  Start 12/22/18 at 09:00;  Stop 1/15/19 at 15:14;  Status DC


Hydralazine HCl (Apresoline) 10 mg TID PO  Last administered on 12/25/18at 08:39

;  Start 12/21/18 at 21:00;  Stop 12/25/18 at 12:27;  Status DC


Multivitamins/ Calcium (Thera-M Plus) 1 tab DAILY PO  Last administered on 1/15/

19at 08:45;  Start 12/22/18 at 09:00;  Stop 1/15/19 at 15:14;  Status DC


Olanzapine (ZyPREXA) 5 mg PRN DAILY  PRN PO ANXIETY/AGITATION Last administered 

on 1/15/19at 12:21;  Start 12/22/18 at 09:00;  Stop 1/15/19 at 15:14;  Status DC


Pantoprazole Sodium (Protonix) 40 mg DAILYAC PO  Last administered on 1/15/19at 

07:34;  Start 12/22/18 at 07:30;  Stop 1/15/19 at 15:14;  Status DC


Phenazopyridine HCl (Pyridium) 100 mg PRN TID  PRN PO URINARY PAIN;  Start 12/21 /18 at 19:45;  Stop 1/15/19 at 15:14;  Status DC


Polyethylene Glycol (miraLAX) 17 gm PRN BID  PRN PO CONSTIPATION;  Start 12/22/ 18 at 09:00;  Stop 1/15/19 at 15:14;  Status DC


Sodium Chloride (Saline Mist Nasal) 1 nieves QID NS ;  Start 12/21/18 at 21:00;  

Status Cancel


Trazodone HCl (Desyrel) 25 mg PRN BID  PRN PO ANXIETY/AGITATION;  Start 12/21/ 18 at 19:45;  Stop 1/15/19 at 15:14;  Status DC


Trazodone HCl (Desyrel) 50 mg PRN QHS  PRN PO INSOMNIA;  Start 12/21/18 at 19:45

;  Stop 1/15/19 at 15:14;  Status DC


Lidocaine (Lidoderm) 1 patch DAILY TD  Last administered on 1/15/19at 08:46;  

Start 12/22/18 at 09:00;  Stop 1/15/19 at 15:14;  Status DC


Multivitamins/ Minerals (I-Irina) 1 tab DAILY PO  Last administered on 1/15/19at 

08:43;  Start 12/22/18 at 09:00;  Stop 1/15/19 at 15:14;  Status DC


Miscellaneous (Lidoderm Patch Removal) 1 ea QHS MC  Last administered on 1/14/ 19at 20:21;  Start 12/21/18 at 21:00;  Stop 1/15/19 at 15:14;  Status DC


Albuterol Sulfate (Ventolin) 2.5 mg PRN Q6HRS  PRN NEB SHORTNESS OF BREATH Last 

administered on 1/11/19at 20:13;  Start 12/21/18 at 19:45;  Stop 1/15/19 at 15:

14;  Status DC


Albuterol Sulfate (Ventolin) 2.5 mg RTQID NEB  Last administered on 1/15/19at 09

:41;  Start 12/21/18 at 20:00;  Stop 1/15/19 at 15:14;  Status DC


Budesonide (Pulmicort) 0.5 mg RTBID NEB  Last administered on 1/15/19at 09:41;  

Start 12/21/18 at 20:00;  Stop 1/15/19 at 15:14;  Status DC


Sodium Chloride (Ayr Saline Nasal) 2 nieves QID NS ;  Start 12/21/18 at 21:00;  

Stop 12/22/18 at 13:08;  Status DC


Levothyroxine Sodium (Synthroid) 75 mcg DAILY06 PO  Last administered on 1/15/

19at 05:59;  Start 12/23/18 at 06:00;  Stop 1/15/19 at 15:14;  Status DC


Sodium Chloride (Saline Mist Nasal) 1 nieves PRN QID  PRN NS NASAL CONGESTION;  

Start 12/22/18 at 13:15;  Stop 1/15/19 at 15:14;  Status DC


Divalproex Sodium (Depakote Sprinkles) 750 mg HS PO  Last administered on 12/23/ 18at 20:20;  Start 12/22/18 at 21:30;  Stop 12/24/18 at 14:27;  Status DC


Sertraline HCl (Zoloft) 25 mg DAILY PO  Last administered on 12/25/18at 08:39;  

Start 12/23/18 at 09:00;  Stop 12/25/18 at 09:01;  Status DC


Sertraline HCl (Zoloft) 50 mg DAILY PO  Last administered on 1/13/19at 08:12;  

Start 12/26/18 at 09:00;  Stop 1/13/19 at 13:49;  Status DC


Divalproex Sodium (Depakote Sprinkles) 500 mg BID94 PO  Last administered on 12/ 24/18at 07:55;  Start 12/24/18 at 09:00;  Stop 12/24/18 at 14:27;  Status DC


Quetiapine Fumarate (SEROquel) 12.5 mg 1300 PO  Last administered on 1/15/19at 

12:23;  Start 12/24/18 at 13:00;  Stop 1/15/19 at 15:14;  Status DC


Divalproex Sodium (Depakote Sprinkles) 500 mg BID PO  Last administered on 12/26 /18at 09:59;  Start 12/24/18 at 21:00;  Stop 12/26/18 at 17:43;  Status DC


Acetaminophen/ Hydrocodone Bitart (Lortab 5/325) 1 tab PRN Q6HRS  PRN PO PAIN 

Last administered on 1/15/19at 08:48;  Start 12/24/18 at 15:15;  Stop 1/15/19 

at 15:14;  Status DC


Olanzapine (ZyPREXA ZYDIS) 2.5 mg PRN Q2HR  PRN PO PSYCHOSIS Last administered 

on 1/11/19at 19:46;  Start 12/24/18 at 19:15;  Stop 1/14/19 at 17:07;  Status DC


Divalproex Sodium (Depakote Sprinkles) 625 mg BID PO  Last administered on 1/15/

19at 08:43;  Start 12/26/18 at 21:00;  Stop 1/15/19 at 15:14;  Status DC


Ondansetron HCl (Zofran Odt) 4 mg PRN Q8HRS  PRN PO NAUSEA/VOMITING Last 

administered on 1/11/19at 19:39;  Start 1/3/19 at 08:30;  Stop 1/15/19 at 15:14

;  Status DC


Levofloxacin (Levaquin) 750 mg DAILY06 PO  Last administered on 1/15/19at 05:59

;  Start 1/10/19 at 19:45;  Stop 1/15/19 at 15:14;  Status DC


Lactobacillus Rhamnosus (Culturelle) 1 cap BID PO  Last administered on 1/15/

19at 08:44;  Start 1/10/19 at 21:00;  Stop 1/15/19 at 15:14;  Status DC


Gabapentin (Neurontin) 100 mg TID PO  Last administered on 1/15/19at 13:00;  

Start 1/13/19 at 21:00;  Stop 1/15/19 at 15:14;  Status DC


Sertraline HCl (Zoloft) 75 mg DAILY PO  Last administered on 1/15/19at 08:43;  

Start 1/14/19 at 09:00;  Stop 1/15/19 at 15:14;  Status DC





Active Scripts


Active


Reported


Levofloxacin 750 Mg Tablet 750 Mg PO DAILY06


Zoloft (Sertraline Hcl) 25 Mg Tablet 75 Mg PO DAILY


Quetiapine Fumarate 25 Mg Tablet 12.5 Mg PO DAILY@1300


Zofran (Ondansetron Hcl) 4 Mg Tablet 4 Mg PO PRN Q8HRS PRN


Analgesic Balm (Methyl Salicylate/Menthol) 28 Gm Oint...g. 1 Nieves TP PRN QID PRN


Milk Of Magnesia (Magnesium Hydroxide) 2,400 Mg/10 Ml Oral.susp 2,400 Mg PO PRN 

QHS PRN


Mag-Al Plus Xs Suspension (Mag Hydrox/Al Hydrox/Simeth) 30 Ml Oral.susp 15 Ml 

PO PRN AFTMEALHC PRN


Culturelle (Lactobacillus Rhamnosus Gg) 1 Each Capsule 1 Each PO BID


Gabapentin ** (Gabapentin) 100 Mg Capsule 100 Mg PO TID


Albuterol Sulfate Neb Soln  ** (Albuterol Sulfate) 2.5 Mg/3 Ml Vial.neb 2.5 Mg 

NEB RTQID


Depakote Sprinkle (Divalproex Sodium) 125 Mg Cap.sprink 625 Mg PO BID


[occuvite]   1 Tab PO DAILY


Pyridium (Phenazopyridine Hcl) 100 Mg Tablet 100 Mg PO PRN TID PRN


Trazodone Hcl 50 Mg Tablet 50 Mg PO PRN QHS PRN


Premarin (Estrogens, Conjugated) 30 Gm Cream.appl 0.5 Gm VG PRN DAILY PRN


Multivitamins (Multivitamin) 1 Each Tablet 1 Tab PO DAILY


Trazodone Hcl 50 Mg Tablet 25 Mg PO BID PRN


Ayr Saline (Sodium Chloride) 50 Ml Drops 1 Nieves NS PRN QID PRN


Miralax (Polyethylene Glycol 3350) 17 Gm Powd.pack 17 Gm PO PRN BID PRN


Protonix (Pantoprazole Sodium) 40 Mg Tablet.dr 40 Mg PO DAILYAC


Lisinopril 10 Mg Tablet 10 Mg PO DAILY


[lidoderm]   1 Patch TD DAILY


Levothyroxine Sodium 75 Mcg Tablet 75 Mcg PO DAILY06


Ipratropium Bromide 0.2 Mg/1 Ml Solution 0.2 Mg IH PRN Q6HRS PRN


Hydrocodone-Apap 5-325  ** (Hydrocodone Bit/Acetaminophen) 1 Each Tablet 1 Tab 

PO PRN Q6HRS PRN


Lovenox (Enoxaparin Sodium) 40 Mg/0.4 Ml Disp.syrin 40 Mg SQ DAILY


Donepezil Hcl 10 Mg Tablet 10 Mg PO HS


Colace (Docusate Sodium) 100 Mg Capsule 100 Mg PO PRN DAILY PRN


Coreg  ** (Carvedilol) 6.25 Mg Tablet 6.25 Mg PO BIDWMEALS


Symbicort 160-4.5 Mcg Inhaler (Budesonide/Formoterol Fumarate) 10.2 Gm 

Hfa.aer.ad 2 Puff IH BID


Lipitor (Atorvastatin Calcium) 20 Mg Tablet 20 Mg PO QHS


Aspirin Ec (Aspirin) 325 Mg Tablet.dr 325 Mg PO DAILY


Albuterol Sulfate Conc Neb Soln (Albuterol Sulfate) 2.5 Mg/0.5 Ml Vial.neb 2.5 

Mg NEB PRN Q6HRS PRN


Acetaminophen 650 Mg/20.3 Ml Solution 650 Mg PO PRN Q6HRS PRN


I have reviewed the current psychotropics carefully including drug 

interactions.  Risk benefit ratio favors no change other than as noted in my 

dictated progress note.





Diagnosis:


Problems:  


(1) Dementia with behavioral disturbance


(2) Anxiety disorder


(3) Major depressive disorder, recurrent episode


(4) Psychosis, atypical


(5) Impulse control disorder











JULY ZAMUDIO MD Jan 16, 2019 10:57